# Patient Record
Sex: FEMALE | Race: WHITE | Employment: UNEMPLOYED | URBAN - NONMETROPOLITAN AREA
[De-identification: names, ages, dates, MRNs, and addresses within clinical notes are randomized per-mention and may not be internally consistent; named-entity substitution may affect disease eponyms.]

---

## 2017-01-11 ENCOUNTER — HOSPITAL ENCOUNTER (OUTPATIENT)
Dept: LAB | Age: 62
Discharge: HOME | End: 2017-01-11
Attending: DERMATOLOGY | Admitting: DERMATOLOGY

## 2017-01-11 LAB
ALT SERPL-CCNC: 11 U/L (ref 10–40)
AST SERPL-CCNC: 20 IU/L (ref 15–37)
BUN BLDV-MCNC: 5 MG/DL (ref 6–23)
CREAT SERPL-MCNC: 0.6 MG/DL (ref 0.6–1.1)
GFR AFRICAN AMERICAN: >60 ML/MIN/1.73M2
GFR NON-AFRICAN AMERICAN: >60 ML/MIN/1.73M2
HCT VFR BLD CALC: 38.4 % (ref 37–47)
HEMOGLOBIN: 13.3 GM/DL (ref 12.5–16)
MCH RBC QN AUTO: 34.6 PG (ref 27–31)
MCHC RBC AUTO-ENTMCNC: 34.6 % (ref 32–36)
MCV RBC AUTO: 100 FL (ref 78–100)
PDW BLD-RTO: 11.9 % (ref 11.7–14.9)
PLATELET # BLD: 252 K/CU MM (ref 140–440)
PMV BLD AUTO: 9.6 FL (ref 7.5–11.1)
RBC # BLD: 3.84 M/CU MM (ref 4.2–5.4)
WBC # BLD: 6.7 K/CU MM (ref 4–10.5)

## 2017-01-13 LAB — ANTI-NUCLEAR ANTIBODY (ANA): NORMAL

## 2017-01-30 ENCOUNTER — OFFICE VISIT (OUTPATIENT)
Dept: INTERNAL MEDICINE CLINIC | Age: 62
End: 2017-01-30

## 2017-01-30 VITALS
TEMPERATURE: 98.3 F | RESPIRATION RATE: 12 BRPM | BODY MASS INDEX: 17.08 KG/M2 | HEIGHT: 63 IN | OXYGEN SATURATION: 98 % | DIASTOLIC BLOOD PRESSURE: 72 MMHG | HEART RATE: 76 BPM | SYSTOLIC BLOOD PRESSURE: 136 MMHG | WEIGHT: 96.4 LBS

## 2017-01-30 DIAGNOSIS — N83.201 CYST OF RIGHT OVARY: ICD-10-CM

## 2017-01-30 DIAGNOSIS — L93.0 DISCOID LUPUS: ICD-10-CM

## 2017-01-30 DIAGNOSIS — R63.4 WEIGHT LOSS: Primary | ICD-10-CM

## 2017-01-30 DIAGNOSIS — Z72.0 TOBACCO ABUSE DISORDER: ICD-10-CM

## 2017-01-30 DIAGNOSIS — Z23 NEED FOR PROPHYLACTIC VACCINATION AGAINST STREPTOCOCCUS PNEUMONIAE (PNEUMOCOCCUS): ICD-10-CM

## 2017-01-30 PROCEDURE — 90471 IMMUNIZATION ADMIN: CPT | Performed by: INTERNAL MEDICINE

## 2017-01-30 PROCEDURE — 90670 PCV13 VACCINE IM: CPT | Performed by: INTERNAL MEDICINE

## 2017-01-30 PROCEDURE — 99213 OFFICE O/P EST LOW 20 MIN: CPT | Performed by: INTERNAL MEDICINE

## 2017-01-30 ASSESSMENT — ENCOUNTER SYMPTOMS
GASTROINTESTINAL NEGATIVE: 1
EYES NEGATIVE: 1

## 2017-03-29 ENCOUNTER — TELEPHONE (OUTPATIENT)
Dept: INTERNAL MEDICINE CLINIC | Age: 62
End: 2017-03-29

## 2017-08-01 ENCOUNTER — HOSPITAL ENCOUNTER (OUTPATIENT)
Age: 62
Discharge: HOME | End: 2017-08-01
Payer: COMMERCIAL

## 2017-08-01 LAB
ALBUMIN SERPL-MCNC: NORMAL G/DL
ALP BLD-CCNC: NORMAL U/L
ALT SERPL-CCNC: 29 U/L
ALT SERPL-CCNC: 29 U/L (ref 11–66)
ANA TITER: NORMAL
ANION GAP SERPL CALCULATED.3IONS-SCNC: NORMAL MMOL/L
AST SERPL-CCNC: NORMAL U/L
BASOPHILS ABSOLUTE: ABNORMAL /ΜL
BASOPHILS RELATIVE PERCENT: ABNORMAL %
BILIRUB SERPL-MCNC: NORMAL MG/DL (ref 0.1–1.4)
BUN BLDV-MCNC: 8 MG/DL
BUN BLDV-MCNC: 8 MG/DL (ref 7–22)
CALCIUM SERPL-MCNC: NORMAL MG/DL
CHLORIDE BLD-SCNC: NORMAL MMOL/L
CO2: NORMAL MMOL/L
CREAT SERPL-MCNC: 0.4 MG/DL
CREAT SERPL-MCNC: 0.4 MG/DL (ref 0.4–1.2)
EOSINOPHILS ABSOLUTE: ABNORMAL /ΜL
EOSINOPHILS RELATIVE PERCENT: ABNORMAL %
GFR CALCULATED: >90
GFR SERPL CREATININE-BSD FRML MDRD: > 90 ML/MIN/1.73M2
GLUCOSE BLD-MCNC: 82 MG/DL
GLUCOSE BLD-MCNC: 82 MG/DL (ref 70–108)
HCT VFR BLD CALC: 40.9 % (ref 36–46)
HCT VFR BLD CALC: 40.9 % (ref 37–47)
HEMOGLOBIN: 13.9 G/DL (ref 12–16)
HEMOGLOBIN: 13.9 GM/DL (ref 12–16)
LD: 177 U/L (ref 100–190)
LYMPHOCYTES ABSOLUTE: ABNORMAL /ΜL
LYMPHOCYTES RELATIVE PERCENT: ABNORMAL %
MCH RBC QN AUTO: 34.5 PG
MCH RBC QN AUTO: 34.5 PG (ref 27–31)
MCHC RBC AUTO-ENTMCNC: 34 G/DL
MCHC RBC AUTO-ENTMCNC: 34 GM/DL (ref 33–37)
MCV RBC AUTO: 101.5 FL
MCV RBC AUTO: 101.5 FL (ref 81–99)
MONOCYTES ABSOLUTE: ABNORMAL /ΜL
MONOCYTES RELATIVE PERCENT: ABNORMAL %
NEUTROPHILS ABSOLUTE: ABNORMAL /ΜL
NEUTROPHILS RELATIVE PERCENT: ABNORMAL %
PDW BLD-RTO: 13.2 % (ref 11.5–14.5)
PLATELET # BLD: 274 K/ΜL
PLATELET # BLD: 274 THOU/MM3 (ref 130–400)
PMV BLD AUTO: 7.9 FL
PMV BLD AUTO: 7.9 MCM (ref 7.4–10.4)
POTASSIUM SERPL-SCNC: NORMAL MMOL/L
RBC # BLD: 4.03 10^6/ΜL
RBC # BLD: 4.03 MILL/MM3 (ref 4.2–5.4)
SEDIMENTATION RATE, ERYTHROCYTE: 20
SEDIMENTATION RATE, ERYTHROCYTE: 20 MM/HR (ref 0–20)
SODIUM BLD-SCNC: NORMAL MMOL/L
TOTAL PROTEIN: NORMAL
WBC # BLD: 3.9 10^3/ML
WBC # BLD: 3.9 THOU/MM3 (ref 4.8–10.8)

## 2017-08-01 PROCEDURE — 83615 LACTATE (LD) (LDH) ENZYME: CPT

## 2017-08-01 PROCEDURE — 36415 COLL VENOUS BLD VENIPUNCTURE: CPT

## 2017-08-01 PROCEDURE — 85651 RBC SED RATE NONAUTOMATED: CPT

## 2017-08-01 PROCEDURE — 86038 ANTINUCLEAR ANTIBODIES: CPT

## 2017-08-01 PROCEDURE — 82947 ASSAY GLUCOSE BLOOD QUANT: CPT

## 2017-08-01 PROCEDURE — 85027 COMPLETE CBC AUTOMATED: CPT

## 2017-08-01 PROCEDURE — 82565 ASSAY OF CREATININE: CPT

## 2017-08-01 PROCEDURE — 84460 ALANINE AMINO (ALT) (SGPT): CPT

## 2017-08-01 PROCEDURE — 84520 ASSAY OF UREA NITROGEN: CPT

## 2017-08-03 LAB — ANA SCREEN: NORMAL

## 2018-03-13 ENCOUNTER — HOSPITAL ENCOUNTER (OUTPATIENT)
Age: 63
Discharge: HOME | End: 2018-03-13
Payer: COMMERCIAL

## 2018-03-13 LAB
ALBUMIN SERPL-MCNC: 4.5 G/DL (ref 3.5–5.1)
ALP BLD-CCNC: 80 U/L (ref 38–126)
ALT SERPL-CCNC: 24 U/L (ref 11–66)
ANION GAP SERPL CALCULATED.3IONS-SCNC: 15 MEQ/L (ref 8–16)
AST SERPL-CCNC: 27 U/L (ref 5–40)
BASOPHILS ABSOLUTE: ABNORMAL /ΜL
BASOPHILS RELATIVE PERCENT: ABNORMAL %
BILIRUB SERPL-MCNC: 0.3 MG/DL (ref 0.3–1.2)
BUN BLDV-MCNC: 6 MG/DL (ref 7–22)
CALCIUM SERPL-MCNC: 9.6 MG/DL (ref 8.5–10.5)
CHLORIDE BLD-SCNC: 93 MEQ/L (ref 98–111)
CO2: 25 MEQ/L (ref 23–33)
CREAT SERPL-MCNC: 0.4 MG/DL (ref 0.4–1.2)
EOSINOPHILS ABSOLUTE: ABNORMAL /ΜL
EOSINOPHILS RELATIVE PERCENT: ABNORMAL %
GFR SERPL CREATININE-BSD FRML MDRD: > 90 ML/MIN/1.73M2
GLUCOSE BLD-MCNC: 76 MG/DL (ref 70–108)
HCT VFR BLD CALC: 40.7 % (ref 36–46)
HCT VFR BLD CALC: 40.7 % (ref 37–47)
HEMOGLOBIN: 14.2 G/DL (ref 12–16)
HEMOGLOBIN: 14.2 GM/DL (ref 12–16)
LYMPHOCYTES ABSOLUTE: ABNORMAL /ΜL
LYMPHOCYTES RELATIVE PERCENT: ABNORMAL %
MCH RBC QN AUTO: 35.3 PG
MCH RBC QN AUTO: 35.3 PG (ref 27–31)
MCHC RBC AUTO-ENTMCNC: 34.9 G/DL
MCHC RBC AUTO-ENTMCNC: 34.9 GM/DL (ref 33–37)
MCV RBC AUTO: 101.2 FL
MCV RBC AUTO: 101.2 FL (ref 81–99)
MONOCYTES ABSOLUTE: ABNORMAL /ΜL
MONOCYTES RELATIVE PERCENT: ABNORMAL %
NEUTROPHILS ABSOLUTE: ABNORMAL /ΜL
NEUTROPHILS RELATIVE PERCENT: ABNORMAL %
PDW BLD-RTO: 12.1 % (ref 11.5–14.5)
PLATELET # BLD: 295 K/ΜL
PLATELET # BLD: 295 THOU/MM3 (ref 130–400)
PMV BLD AUTO: 7.8 FL
PMV BLD AUTO: 7.8 FL (ref 7.4–10.4)
POTASSIUM SERPL-SCNC: 3.9 MEQ/L (ref 3.5–5.2)
RBC # BLD: 4.02 10^6/ΜL
RBC # BLD: 4.02 MILL/MM3 (ref 4.2–5.4)
SODIUM BLD-SCNC: 133 MEQ/L (ref 135–145)
TOTAL PROTEIN: 7.7 G/DL (ref 6.1–8)
WBC # BLD: 4.3 10^3/ML
WBC # BLD: 4.3 THOU/MM3 (ref 4.8–10.8)

## 2018-03-13 PROCEDURE — 80053 COMPREHEN METABOLIC PANEL: CPT

## 2018-03-13 PROCEDURE — 85027 COMPLETE CBC AUTOMATED: CPT

## 2018-03-13 PROCEDURE — 36415 COLL VENOUS BLD VENIPUNCTURE: CPT

## 2018-04-08 ENCOUNTER — HOSPITAL ENCOUNTER (INPATIENT)
Age: 63
LOS: 3 days | Discharge: HOME HEALTH | DRG: 190 | End: 2018-04-11
Attending: FAMILY MEDICINE | Admitting: INTERNAL MEDICINE
Payer: COMMERCIAL

## 2018-04-08 ENCOUNTER — APPOINTMENT (OUTPATIENT)
Dept: GENERAL RADIOLOGY | Age: 63
DRG: 190 | End: 2018-04-08
Payer: COMMERCIAL

## 2018-04-08 ENCOUNTER — APPOINTMENT (OUTPATIENT)
Dept: CT IMAGING | Age: 63
DRG: 190 | End: 2018-04-08
Payer: COMMERCIAL

## 2018-04-08 DIAGNOSIS — J84.9 INTERSTITIAL PNEUMONIA (HCC): ICD-10-CM

## 2018-04-08 DIAGNOSIS — J44.1 COPD WITH EXACERBATION (HCC): ICD-10-CM

## 2018-04-08 DIAGNOSIS — J18.9 PNEUMONIA DUE TO ORGANISM: Primary | ICD-10-CM

## 2018-04-08 DIAGNOSIS — E44.0 MODERATE PROTEIN-CALORIE MALNUTRITION (HCC): ICD-10-CM

## 2018-04-08 DIAGNOSIS — E87.1 HYPONATREMIA: ICD-10-CM

## 2018-04-08 PROBLEM — R59.0 MEDIASTINAL LYMPHADENOPATHY: Status: ACTIVE | Noted: 2018-04-08

## 2018-04-08 LAB
ALBUMIN SERPL-MCNC: 3.8 G/DL (ref 3.5–5.1)
ALLEN TEST: POSITIVE
ALP BLD-CCNC: 80 U/L (ref 38–126)
ALT SERPL-CCNC: 14 U/L (ref 11–66)
ANION GAP SERPL CALCULATED.3IONS-SCNC: 16 MEQ/L (ref 8–16)
APTT: 28 SECONDS (ref 22–38)
AST SERPL-CCNC: 19 U/L (ref 5–40)
BANDED NEUTROPHILS ABSOLUTE COUNT: 0.3 THOU/MM3
BANDS PRESENT: 3 %
BASE EXCESS (CALCULATED): -2.8 MMOL/L (ref -2.5–2.5)
BASOPHILS # BLD: 0 %
BASOPHILS ABSOLUTE: 0 THOU/MM3 (ref 0–0.1)
BILIRUB SERPL-MCNC: 0.6 MG/DL (ref 0.3–1.2)
BUN BLDV-MCNC: 5 MG/DL (ref 7–22)
CALCIUM SERPL-MCNC: 9.1 MG/DL (ref 8.5–10.5)
CHLORIDE BLD-SCNC: 83 MEQ/L (ref 98–111)
CO2: 22 MEQ/L (ref 23–33)
COLLECTED BY:: ABNORMAL
CREAT SERPL-MCNC: 0.3 MG/DL (ref 0.4–1.2)
DEVICE: ABNORMAL
DIFFERENTIAL, MANUAL: NORMAL
EKG ATRIAL RATE: 117 BPM
EKG P AXIS: 78 DEGREES
EKG P-R INTERVAL: 162 MS
EKG Q-T INTERVAL: 326 MS
EKG QRS DURATION: 90 MS
EKG QTC CALCULATION (BAZETT): 454 MS
EKG R AXIS: 79 DEGREES
EKG T AXIS: 76 DEGREES
EKG VENTRICULAR RATE: 117 BPM
EOSINOPHIL # BLD: 0 %
EOSINOPHILS ABSOLUTE: 0 THOU/MM3 (ref 0–0.4)
GFR SERPL CREATININE-BSD FRML MDRD: > 90 ML/MIN/1.73M2
GLUCOSE BLD-MCNC: 99 MG/DL (ref 70–108)
HCO3: 21 MMOL/L (ref 23–28)
HCT VFR BLD CALC: 39.2 % (ref 37–47)
HEMOGLOBIN: 13 GM/DL (ref 12–16)
IFIO2: 2
LACTIC ACID: 1.3 MMOL/L (ref 0.5–2.2)
LIPASE: 13 U/L (ref 5.6–51.3)
LYMPHOCYTES # BLD: 21 %
LYMPHOCYTES ABSOLUTE: 1.9 THOU/MM3 (ref 1–4.8)
MACROCYTES: ABNORMAL
MCH RBC QN AUTO: 33.5 PG (ref 27–31)
MCHC RBC AUTO-ENTMCNC: 33.3 GM/DL (ref 33–37)
MCV RBC AUTO: 100.5 FL (ref 81–99)
MONOCYTES # BLD: 9 %
MONOCYTES ABSOLUTE: 0.8 THOU/MM3 (ref 0.4–1.3)
NUCLEATED RED BLOOD CELLS: 0 /100 WBC
O2 SATURATION: 94 %
OSMOLALITY CALCULATION: 241.3 MOSMOL/KG (ref 275–300)
PCO2: 33 MMHG (ref 35–45)
PDW BLD-RTO: 12.6 % (ref 11.5–14.5)
PH BLOOD GAS: 7.42 (ref 7.35–7.45)
PLATELET # BLD: 273 THOU/MM3 (ref 130–400)
PLATELET ESTIMATE: ADEQUATE
PMV BLD AUTO: 8.6 FL (ref 7.4–10.4)
PO2: 67 MMHG (ref 71–104)
POTASSIUM REFLEX MAGNESIUM: 4 MEQ/L (ref 3.5–5.2)
PRO-BNP: 249 PG/ML (ref 0–900)
PROCALCITONIN: 0.95 NG/ML (ref 0.01–0.09)
RBC # BLD: 3.9 MILL/MM3 (ref 4.2–5.4)
SEG NEUTROPHILS: 67 %
SEGMENTED NEUTROPHILS ABSOLUTE COUNT: 6 THOU/MM3 (ref 1.8–7.7)
SODIUM BLD-SCNC: 121 MEQ/L (ref 135–145)
SODIUM BLD-SCNC: 126 MEQ/L (ref 135–145)
SOURCE, BLOOD GAS: ABNORMAL
TOTAL PROTEIN: 7.2 G/DL (ref 6.1–8)
TROPONIN T: < 0.01 NG/ML
TSH SERPL DL<=0.05 MIU/L-ACNC: 0.94 UIU/ML (ref 0.4–4.2)
WBC # BLD: 8.9 THOU/MM3 (ref 4.8–10.8)

## 2018-04-08 PROCEDURE — 96365 THER/PROPH/DIAG IV INF INIT: CPT

## 2018-04-08 PROCEDURE — 6360000004 HC RX CONTRAST MEDICATION: Performed by: FAMILY MEDICINE

## 2018-04-08 PROCEDURE — 2580000003 HC RX 258: Performed by: INTERNAL MEDICINE

## 2018-04-08 PROCEDURE — 71275 CT ANGIOGRAPHY CHEST: CPT

## 2018-04-08 PROCEDURE — 36415 COLL VENOUS BLD VENIPUNCTURE: CPT

## 2018-04-08 PROCEDURE — 84484 ASSAY OF TROPONIN QUANT: CPT

## 2018-04-08 PROCEDURE — 82803 BLOOD GASES ANY COMBINATION: CPT

## 2018-04-08 PROCEDURE — 85025 COMPLETE CBC W/AUTO DIFF WBC: CPT

## 2018-04-08 PROCEDURE — 94640 AIRWAY INHALATION TREATMENT: CPT

## 2018-04-08 PROCEDURE — 80053 COMPREHEN METABOLIC PANEL: CPT

## 2018-04-08 PROCEDURE — 2580000003 HC RX 258: Performed by: FAMILY MEDICINE

## 2018-04-08 PROCEDURE — 99223 1ST HOSP IP/OBS HIGH 75: CPT | Performed by: INTERNAL MEDICINE

## 2018-04-08 PROCEDURE — 99285 EMERGENCY DEPT VISIT HI MDM: CPT

## 2018-04-08 PROCEDURE — 1200000003 HC TELEMETRY R&B

## 2018-04-08 PROCEDURE — 6360000002 HC RX W HCPCS: Performed by: FAMILY MEDICINE

## 2018-04-08 PROCEDURE — 71045 X-RAY EXAM CHEST 1 VIEW: CPT

## 2018-04-08 PROCEDURE — 84145 PROCALCITONIN (PCT): CPT

## 2018-04-08 PROCEDURE — 6360000002 HC RX W HCPCS: Performed by: INTERNAL MEDICINE

## 2018-04-08 PROCEDURE — 96375 TX/PRO/DX INJ NEW DRUG ADDON: CPT

## 2018-04-08 PROCEDURE — 83880 ASSAY OF NATRIURETIC PEPTIDE: CPT

## 2018-04-08 PROCEDURE — 87040 BLOOD CULTURE FOR BACTERIA: CPT

## 2018-04-08 PROCEDURE — 83690 ASSAY OF LIPASE: CPT

## 2018-04-08 PROCEDURE — 84443 ASSAY THYROID STIM HORMONE: CPT

## 2018-04-08 PROCEDURE — 6370000000 HC RX 637 (ALT 250 FOR IP): Performed by: FAMILY MEDICINE

## 2018-04-08 PROCEDURE — 6370000000 HC RX 637 (ALT 250 FOR IP): Performed by: INTERNAL MEDICINE

## 2018-04-08 PROCEDURE — 84295 ASSAY OF SERUM SODIUM: CPT

## 2018-04-08 PROCEDURE — 93005 ELECTROCARDIOGRAM TRACING: CPT | Performed by: FAMILY MEDICINE

## 2018-04-08 PROCEDURE — 83605 ASSAY OF LACTIC ACID: CPT

## 2018-04-08 PROCEDURE — 85730 THROMBOPLASTIN TIME PARTIAL: CPT

## 2018-04-08 PROCEDURE — 36600 WITHDRAWAL OF ARTERIAL BLOOD: CPT

## 2018-04-08 RX ORDER — IPRATROPIUM BROMIDE AND ALBUTEROL SULFATE 2.5; .5 MG/3ML; MG/3ML
2 SOLUTION RESPIRATORY (INHALATION) ONCE
Status: COMPLETED | OUTPATIENT
Start: 2018-04-08 | End: 2018-04-08

## 2018-04-08 RX ORDER — 0.9 % SODIUM CHLORIDE 0.9 %
1000 INTRAVENOUS SOLUTION INTRAVENOUS ONCE
Status: COMPLETED | OUTPATIENT
Start: 2018-04-08 | End: 2018-04-08

## 2018-04-08 RX ORDER — SODIUM CHLORIDE 450 MG/100ML
INJECTION, SOLUTION INTRAVENOUS CONTINUOUS
Status: DISCONTINUED | OUTPATIENT
Start: 2018-04-08 | End: 2018-04-09

## 2018-04-08 RX ORDER — GUAIFENESIN 600 MG/1
600 TABLET, EXTENDED RELEASE ORAL 2 TIMES DAILY
Status: DISCONTINUED | OUTPATIENT
Start: 2018-04-08 | End: 2018-04-11 | Stop reason: HOSPADM

## 2018-04-08 RX ORDER — ONDANSETRON 2 MG/ML
4 INJECTION INTRAMUSCULAR; INTRAVENOUS EVERY 6 HOURS PRN
Status: DISCONTINUED | OUTPATIENT
Start: 2018-04-08 | End: 2018-04-11 | Stop reason: HOSPADM

## 2018-04-08 RX ORDER — IPRATROPIUM BROMIDE AND ALBUTEROL SULFATE 2.5; .5 MG/3ML; MG/3ML
1 SOLUTION RESPIRATORY (INHALATION)
Status: DISCONTINUED | OUTPATIENT
Start: 2018-04-09 | End: 2018-04-11 | Stop reason: HOSPADM

## 2018-04-08 RX ORDER — LEVOFLOXACIN 5 MG/ML
750 INJECTION, SOLUTION INTRAVENOUS ONCE
Status: COMPLETED | OUTPATIENT
Start: 2018-04-08 | End: 2018-04-08

## 2018-04-08 RX ORDER — METHYLPREDNISOLONE SODIUM SUCCINATE 125 MG/2ML
125 INJECTION, POWDER, LYOPHILIZED, FOR SOLUTION INTRAMUSCULAR; INTRAVENOUS ONCE
Status: COMPLETED | OUTPATIENT
Start: 2018-04-08 | End: 2018-04-08

## 2018-04-08 RX ORDER — HYDROXYCHLOROQUINE SULFATE 200 MG/1
200 TABLET, FILM COATED ORAL 2 TIMES DAILY
Status: DISCONTINUED | OUTPATIENT
Start: 2018-04-08 | End: 2018-04-11 | Stop reason: HOSPADM

## 2018-04-08 RX ORDER — ACETAMINOPHEN 325 MG/1
650 TABLET ORAL EVERY 4 HOURS PRN
Status: DISCONTINUED | OUTPATIENT
Start: 2018-04-08 | End: 2018-04-11 | Stop reason: HOSPADM

## 2018-04-08 RX ORDER — SODIUM CHLORIDE 0.9 % (FLUSH) 0.9 %
10 SYRINGE (ML) INJECTION EVERY 12 HOURS SCHEDULED
Status: DISCONTINUED | OUTPATIENT
Start: 2018-04-08 | End: 2018-04-11 | Stop reason: HOSPADM

## 2018-04-08 RX ORDER — SODIUM CHLORIDE 0.9 % (FLUSH) 0.9 %
10 SYRINGE (ML) INJECTION PRN
Status: DISCONTINUED | OUTPATIENT
Start: 2018-04-08 | End: 2018-04-11 | Stop reason: HOSPADM

## 2018-04-08 RX ORDER — METHYLPREDNISOLONE SODIUM SUCCINATE 40 MG/ML
40 INJECTION, POWDER, LYOPHILIZED, FOR SOLUTION INTRAMUSCULAR; INTRAVENOUS DAILY
Status: DISCONTINUED | OUTPATIENT
Start: 2018-04-09 | End: 2018-04-09

## 2018-04-08 RX ORDER — ALBUTEROL SULFATE 2.5 MG/3ML
5 SOLUTION RESPIRATORY (INHALATION) ONCE
Status: COMPLETED | OUTPATIENT
Start: 2018-04-08 | End: 2018-04-08

## 2018-04-08 RX ORDER — ALBUTEROL SULFATE 2.5 MG/3ML
2.5 SOLUTION RESPIRATORY (INHALATION)
Status: DISCONTINUED | OUTPATIENT
Start: 2018-04-08 | End: 2018-04-11 | Stop reason: HOSPADM

## 2018-04-08 RX ADMIN — IPRATROPIUM BROMIDE AND ALBUTEROL SULFATE 2 AMPULE: .5; 3 SOLUTION RESPIRATORY (INHALATION) at 18:02

## 2018-04-08 RX ADMIN — IOPAMIDOL 80 ML: 755 INJECTION, SOLUTION INTRAVENOUS at 18:31

## 2018-04-08 RX ADMIN — SODIUM CHLORIDE 1000 ML: 9 INJECTION, SOLUTION INTRAVENOUS at 18:00

## 2018-04-08 RX ADMIN — HYDROXYCHLOROQUINE SULFATE 200 MG: 200 TABLET, FILM COATED ORAL at 21:34

## 2018-04-08 RX ADMIN — ENOXAPARIN SODIUM 40 MG: 40 INJECTION SUBCUTANEOUS at 21:34

## 2018-04-08 RX ADMIN — Medication 10 ML: at 21:34

## 2018-04-08 RX ADMIN — METHYLPREDNISOLONE SODIUM SUCCINATE 125 MG: 125 INJECTION, POWDER, FOR SOLUTION INTRAMUSCULAR; INTRAVENOUS at 18:42

## 2018-04-08 RX ADMIN — SODIUM CHLORIDE: 4.5 INJECTION, SOLUTION INTRAVENOUS at 21:34

## 2018-04-08 RX ADMIN — CEFTRIAXONE SODIUM 1 G: 1 INJECTION, POWDER, FOR SOLUTION INTRAMUSCULAR; INTRAVENOUS at 18:43

## 2018-04-08 RX ADMIN — LEVOFLOXACIN 750 MG: 5 INJECTION, SOLUTION INTRAVENOUS at 19:31

## 2018-04-08 RX ADMIN — ACETAMINOPHEN 650 MG: 325 TABLET ORAL at 21:50

## 2018-04-08 RX ADMIN — GUAIFENESIN 600 MG: 600 TABLET, EXTENDED RELEASE ORAL at 21:34

## 2018-04-08 RX ADMIN — ALBUTEROL SULFATE 5 MG: 2.5 SOLUTION RESPIRATORY (INHALATION) at 18:02

## 2018-04-08 ASSESSMENT — PAIN DESCRIPTION - PAIN TYPE
TYPE_2: ACUTE PAIN
TYPE: ACUTE PAIN
TYPE: CHRONIC PAIN

## 2018-04-08 ASSESSMENT — ENCOUNTER SYMPTOMS
ABDOMINAL PAIN: 0
EYE DISCHARGE: 0
SORE THROAT: 0
EYE PAIN: 0
COUGH: 1
RHINORRHEA: 0
CHEST TIGHTNESS: 1
DIARRHEA: 0
WHEEZING: 0
NAUSEA: 0
SHORTNESS OF BREATH: 1
BACK PAIN: 0
VOMITING: 0

## 2018-04-08 ASSESSMENT — PAIN SCALES - GENERAL
PAINLEVEL_OUTOF10: 8
PAINLEVEL_OUTOF10: 3
PAINLEVEL_OUTOF10: 1
PAINLEVEL_OUTOF10: 3

## 2018-04-08 ASSESSMENT — PAIN DESCRIPTION - ONSET
ONSET_2: GRADUAL
ONSET: ON-GOING

## 2018-04-08 ASSESSMENT — PAIN DESCRIPTION - INTENSITY
RATING_2: 3
RATING_2: 1

## 2018-04-08 ASSESSMENT — PAIN DESCRIPTION - LOCATION
LOCATION: BACK
LOCATION: RIB CAGE
LOCATION_2: HEAD

## 2018-04-08 ASSESSMENT — PAIN DESCRIPTION - PROGRESSION
CLINICAL_PROGRESSION_2: NOT CHANGED
CLINICAL_PROGRESSION: NOT CHANGED

## 2018-04-08 ASSESSMENT — PAIN DESCRIPTION - ORIENTATION
ORIENTATION: RIGHT
ORIENTATION: RIGHT;LOWER

## 2018-04-08 ASSESSMENT — PAIN DESCRIPTION - DESCRIPTORS
DESCRIPTORS_2: HEADACHE
DESCRIPTORS: ACHING

## 2018-04-08 ASSESSMENT — PAIN DESCRIPTION - DURATION: DURATION_2: INTERMITTENT

## 2018-04-08 ASSESSMENT — PAIN DESCRIPTION - FREQUENCY: FREQUENCY: INTERMITTENT

## 2018-04-09 PROBLEM — E04.1 THYROID NODULE: Status: ACTIVE | Noted: 2018-04-09

## 2018-04-09 LAB
ANION GAP SERPL CALCULATED.3IONS-SCNC: 12 MEQ/L (ref 8–16)
BUN BLDV-MCNC: 4 MG/DL (ref 7–22)
CALCIUM SERPL-MCNC: 8.5 MG/DL (ref 8.5–10.5)
CHLORIDE BLD-SCNC: 92 MEQ/L (ref 98–111)
CO2: 24 MEQ/L (ref 23–33)
CREAT SERPL-MCNC: 0.4 MG/DL (ref 0.4–1.2)
GFR SERPL CREATININE-BSD FRML MDRD: > 90 ML/MIN/1.73M2
GLUCOSE BLD-MCNC: 159 MG/DL (ref 70–108)
HCT VFR BLD CALC: 35.1 % (ref 37–47)
HEMOGLOBIN: 12.1 GM/DL (ref 12–16)
MCH RBC QN AUTO: 34.9 PG (ref 27–31)
MCHC RBC AUTO-ENTMCNC: 34.6 GM/DL (ref 33–37)
MCV RBC AUTO: 101 FL (ref 81–99)
PDW BLD-RTO: 12.3 % (ref 11.5–14.5)
PLATELET # BLD: 260 THOU/MM3 (ref 130–400)
PMV BLD AUTO: 8.4 FL (ref 7.4–10.4)
POTASSIUM REFLEX MAGNESIUM: 3.8 MEQ/L (ref 3.5–5.2)
RBC # BLD: 3.47 MILL/MM3 (ref 4.2–5.4)
SODIUM BLD-SCNC: 128 MEQ/L (ref 135–145)
SODIUM BLD-SCNC: 128 MEQ/L (ref 135–145)
SODIUM BLD-SCNC: 130 MEQ/L (ref 135–145)
WBC # BLD: 8.5 THOU/MM3 (ref 4.8–10.8)

## 2018-04-09 PROCEDURE — 87070 CULTURE OTHR SPECIMN AEROBIC: CPT

## 2018-04-09 PROCEDURE — 94669 MECHANICAL CHEST WALL OSCILL: CPT

## 2018-04-09 PROCEDURE — 6360000002 HC RX W HCPCS: Performed by: INTERNAL MEDICINE

## 2018-04-09 PROCEDURE — 6370000000 HC RX 637 (ALT 250 FOR IP): Performed by: INTERNAL MEDICINE

## 2018-04-09 PROCEDURE — 36415 COLL VENOUS BLD VENIPUNCTURE: CPT

## 2018-04-09 PROCEDURE — 1200000003 HC TELEMETRY R&B

## 2018-04-09 PROCEDURE — 99223 1ST HOSP IP/OBS HIGH 75: CPT | Performed by: INTERNAL MEDICINE

## 2018-04-09 PROCEDURE — 94640 AIRWAY INHALATION TREATMENT: CPT

## 2018-04-09 PROCEDURE — 99233 SBSQ HOSP IP/OBS HIGH 50: CPT | Performed by: INTERNAL MEDICINE

## 2018-04-09 PROCEDURE — 2580000003 HC RX 258: Performed by: INTERNAL MEDICINE

## 2018-04-09 PROCEDURE — 84295 ASSAY OF SERUM SODIUM: CPT

## 2018-04-09 PROCEDURE — 87899 AGENT NOS ASSAY W/OPTIC: CPT

## 2018-04-09 PROCEDURE — APPSS180 APP SPLIT SHARED TIME > 60 MINUTES: Performed by: NURSE PRACTITIONER

## 2018-04-09 PROCEDURE — 87205 SMEAR GRAM STAIN: CPT

## 2018-04-09 PROCEDURE — 85027 COMPLETE CBC AUTOMATED: CPT

## 2018-04-09 PROCEDURE — 2700000000 HC OXYGEN THERAPY PER DAY

## 2018-04-09 PROCEDURE — 87449 NOS EACH ORGANISM AG IA: CPT

## 2018-04-09 PROCEDURE — 80048 BASIC METABOLIC PNL TOTAL CA: CPT

## 2018-04-09 RX ORDER — AZITHROMYCIN 250 MG/1
500 TABLET, FILM COATED ORAL DAILY
Status: DISCONTINUED | OUTPATIENT
Start: 2018-04-09 | End: 2018-04-11 | Stop reason: HOSPADM

## 2018-04-09 RX ORDER — ALBUTEROL SULFATE 90 UG/1
2 AEROSOL, METERED RESPIRATORY (INHALATION) EVERY 4 HOURS PRN
Status: DISCONTINUED | OUTPATIENT
Start: 2018-04-09 | End: 2018-04-11 | Stop reason: HOSPADM

## 2018-04-09 RX ORDER — SODIUM CHLORIDE 9 MG/ML
INJECTION, SOLUTION INTRAVENOUS CONTINUOUS
Status: DISCONTINUED | OUTPATIENT
Start: 2018-04-09 | End: 2018-04-11

## 2018-04-09 RX ORDER — PREDNISONE 20 MG/1
40 TABLET ORAL DAILY
Status: DISCONTINUED | OUTPATIENT
Start: 2018-04-10 | End: 2018-04-11 | Stop reason: HOSPADM

## 2018-04-09 RX ADMIN — ACETAMINOPHEN 650 MG: 325 TABLET ORAL at 19:30

## 2018-04-09 RX ADMIN — SODIUM CHLORIDE: 4.5 INJECTION, SOLUTION INTRAVENOUS at 10:14

## 2018-04-09 RX ADMIN — IPRATROPIUM BROMIDE AND ALBUTEROL SULFATE 1 AMPULE: .5; 3 SOLUTION RESPIRATORY (INHALATION) at 07:24

## 2018-04-09 RX ADMIN — ONDANSETRON 4 MG: 2 INJECTION INTRAMUSCULAR; INTRAVENOUS at 15:50

## 2018-04-09 RX ADMIN — IPRATROPIUM BROMIDE AND ALBUTEROL SULFATE 1 AMPULE: .5; 3 SOLUTION RESPIRATORY (INHALATION) at 17:29

## 2018-04-09 RX ADMIN — METHYLPREDNISOLONE SODIUM SUCCINATE 40 MG: 40 INJECTION, POWDER, FOR SOLUTION INTRAMUSCULAR; INTRAVENOUS at 09:58

## 2018-04-09 RX ADMIN — CEFTRIAXONE SODIUM 2 G: 2 INJECTION, POWDER, FOR SOLUTION INTRAMUSCULAR; INTRAVENOUS at 15:21

## 2018-04-09 RX ADMIN — IPRATROPIUM BROMIDE AND ALBUTEROL SULFATE 1 AMPULE: .5; 3 SOLUTION RESPIRATORY (INHALATION) at 11:30

## 2018-04-09 RX ADMIN — HYDROXYCHLOROQUINE SULFATE 200 MG: 200 TABLET, FILM COATED ORAL at 20:36

## 2018-04-09 RX ADMIN — AZITHROMYCIN 500 MG: 250 TABLET, FILM COATED ORAL at 15:21

## 2018-04-09 RX ADMIN — SODIUM CHLORIDE: 9 INJECTION, SOLUTION INTRAVENOUS at 13:12

## 2018-04-09 RX ADMIN — IPRATROPIUM BROMIDE AND ALBUTEROL SULFATE 1 AMPULE: .5; 3 SOLUTION RESPIRATORY (INHALATION) at 20:48

## 2018-04-09 RX ADMIN — ENOXAPARIN SODIUM 40 MG: 40 INJECTION SUBCUTANEOUS at 22:20

## 2018-04-09 RX ADMIN — GUAIFENESIN 600 MG: 600 TABLET, EXTENDED RELEASE ORAL at 09:58

## 2018-04-09 RX ADMIN — GUAIFENESIN 600 MG: 600 TABLET, EXTENDED RELEASE ORAL at 20:36

## 2018-04-09 RX ADMIN — HYDROXYCHLOROQUINE SULFATE 200 MG: 200 TABLET, FILM COATED ORAL at 09:58

## 2018-04-09 ASSESSMENT — PAIN DESCRIPTION - PROGRESSION: CLINICAL_PROGRESSION: NOT CHANGED

## 2018-04-09 ASSESSMENT — PAIN SCALES - GENERAL
PAINLEVEL_OUTOF10: 0
PAINLEVEL_OUTOF10: 3

## 2018-04-10 LAB
ALBUMIN SERPL-MCNC: 2.9 G/DL (ref 3.5–5.1)
ALP BLD-CCNC: 68 U/L (ref 38–126)
ALT SERPL-CCNC: 19 U/L (ref 11–66)
ANION GAP SERPL CALCULATED.3IONS-SCNC: 9 MEQ/L (ref 8–16)
AST SERPL-CCNC: 25 U/L (ref 5–40)
BASOPHILS # BLD: 1 %
BASOPHILS ABSOLUTE: 0.1 THOU/MM3 (ref 0–0.1)
BILIRUB SERPL-MCNC: 0.2 MG/DL (ref 0.3–1.2)
BUN BLDV-MCNC: 7 MG/DL (ref 7–22)
CALCIUM SERPL-MCNC: 8.3 MG/DL (ref 8.5–10.5)
CHLORIDE BLD-SCNC: 97 MEQ/L (ref 98–111)
CO2: 25 MEQ/L (ref 23–33)
CREAT SERPL-MCNC: 0.4 MG/DL (ref 0.4–1.2)
EOSINOPHIL # BLD: 0 %
EOSINOPHILS ABSOLUTE: 0 THOU/MM3 (ref 0–0.4)
GFR SERPL CREATININE-BSD FRML MDRD: > 90 ML/MIN/1.73M2
GLUCOSE BLD-MCNC: 144 MG/DL (ref 70–108)
HCT VFR BLD CALC: 31.9 % (ref 37–47)
HEMOGLOBIN: 10.6 GM/DL (ref 12–16)
LYMPHOCYTES # BLD: 10 %
LYMPHOCYTES ABSOLUTE: 1.1 THOU/MM3 (ref 1–4.8)
MACROCYTES: ABNORMAL
MAGNESIUM: 2.1 MG/DL (ref 1.6–2.4)
MCH RBC QN AUTO: 33.7 PG (ref 27–31)
MCHC RBC AUTO-ENTMCNC: 33.2 GM/DL (ref 33–37)
MCV RBC AUTO: 101.4 FL (ref 81–99)
MONOCYTES # BLD: 14.8 %
MONOCYTES ABSOLUTE: 1.6 THOU/MM3 (ref 0.4–1.3)
NUCLEATED RED BLOOD CELLS: 0 /100 WBC
PDW BLD-RTO: 13 % (ref 11.5–14.5)
PHOSPHORUS: 1.5 MG/DL (ref 2.4–4.7)
PLATELET # BLD: 271 THOU/MM3 (ref 130–400)
PMV BLD AUTO: 7.8 FL (ref 7.4–10.4)
POTASSIUM SERPL-SCNC: 3.6 MEQ/L (ref 3.5–5.2)
RBC # BLD: 3.15 MILL/MM3 (ref 4.2–5.4)
SEG NEUTROPHILS: 74.2 %
SEGMENTED NEUTROPHILS ABSOLUTE COUNT: 7.9 THOU/MM3 (ref 1.8–7.7)
SODIUM BLD-SCNC: 131 MEQ/L (ref 135–145)
SODIUM BLD-SCNC: 132 MEQ/L (ref 135–145)
STREP PNEUMO AG, UR: NEGATIVE
TOTAL PROTEIN: 6 G/DL (ref 6.1–8)
WBC # BLD: 10.6 THOU/MM3 (ref 4.8–10.8)

## 2018-04-10 PROCEDURE — 83735 ASSAY OF MAGNESIUM: CPT

## 2018-04-10 PROCEDURE — 6360000002 HC RX W HCPCS: Performed by: INTERNAL MEDICINE

## 2018-04-10 PROCEDURE — 99232 SBSQ HOSP IP/OBS MODERATE 35: CPT | Performed by: INTERNAL MEDICINE

## 2018-04-10 PROCEDURE — 2500000003 HC RX 250 WO HCPCS: Performed by: INTERNAL MEDICINE

## 2018-04-10 PROCEDURE — 36415 COLL VENOUS BLD VENIPUNCTURE: CPT

## 2018-04-10 PROCEDURE — 80053 COMPREHEN METABOLIC PANEL: CPT

## 2018-04-10 PROCEDURE — 94640 AIRWAY INHALATION TREATMENT: CPT

## 2018-04-10 PROCEDURE — 6370000000 HC RX 637 (ALT 250 FOR IP): Performed by: INTERNAL MEDICINE

## 2018-04-10 PROCEDURE — 99233 SBSQ HOSP IP/OBS HIGH 50: CPT | Performed by: INTERNAL MEDICINE

## 2018-04-10 PROCEDURE — 1200000003 HC TELEMETRY R&B

## 2018-04-10 PROCEDURE — 2580000003 HC RX 258: Performed by: INTERNAL MEDICINE

## 2018-04-10 PROCEDURE — 85025 COMPLETE CBC W/AUTO DIFF WBC: CPT

## 2018-04-10 PROCEDURE — 2700000000 HC OXYGEN THERAPY PER DAY

## 2018-04-10 PROCEDURE — 84100 ASSAY OF PHOSPHORUS: CPT

## 2018-04-10 PROCEDURE — 6370000000 HC RX 637 (ALT 250 FOR IP): Performed by: NURSE PRACTITIONER

## 2018-04-10 RX ORDER — GUAIFENESIN/DEXTROMETHORPHAN 100-10MG/5
5 SYRUP ORAL EVERY 4 HOURS PRN
Status: DISCONTINUED | OUTPATIENT
Start: 2018-04-10 | End: 2018-04-11 | Stop reason: HOSPADM

## 2018-04-10 RX ORDER — BENZONATATE 100 MG/1
200 CAPSULE ORAL 3 TIMES DAILY
Status: DISCONTINUED | OUTPATIENT
Start: 2018-04-10 | End: 2018-04-11 | Stop reason: HOSPADM

## 2018-04-10 RX ORDER — NICOTINE 21 MG/24HR
1 PATCH, TRANSDERMAL 24 HOURS TRANSDERMAL DAILY
Status: DISCONTINUED | OUTPATIENT
Start: 2018-04-10 | End: 2018-04-11 | Stop reason: HOSPADM

## 2018-04-10 RX ADMIN — IPRATROPIUM BROMIDE AND ALBUTEROL SULFATE 1 AMPULE: .5; 3 SOLUTION RESPIRATORY (INHALATION) at 15:50

## 2018-04-10 RX ADMIN — BENZONATATE 200 MG: 100 CAPSULE ORAL at 14:40

## 2018-04-10 RX ADMIN — SODIUM CHLORIDE: 9 INJECTION, SOLUTION INTRAVENOUS at 21:06

## 2018-04-10 RX ADMIN — CEFTRIAXONE SODIUM 2 G: 2 INJECTION, POWDER, FOR SOLUTION INTRAMUSCULAR; INTRAVENOUS at 14:40

## 2018-04-10 RX ADMIN — ACETAMINOPHEN 650 MG: 325 TABLET ORAL at 06:13

## 2018-04-10 RX ADMIN — HYDROXYCHLOROQUINE SULFATE 200 MG: 200 TABLET, FILM COATED ORAL at 08:57

## 2018-04-10 RX ADMIN — PREDNISONE 40 MG: 20 TABLET ORAL at 08:57

## 2018-04-10 RX ADMIN — IPRATROPIUM BROMIDE AND ALBUTEROL SULFATE 1 AMPULE: .5; 3 SOLUTION RESPIRATORY (INHALATION) at 08:36

## 2018-04-10 RX ADMIN — GUAIFENESIN 600 MG: 600 TABLET, EXTENDED RELEASE ORAL at 08:57

## 2018-04-10 RX ADMIN — IPRATROPIUM BROMIDE AND ALBUTEROL SULFATE 1 AMPULE: .5; 3 SOLUTION RESPIRATORY (INHALATION) at 20:05

## 2018-04-10 RX ADMIN — ONDANSETRON 4 MG: 2 INJECTION INTRAMUSCULAR; INTRAVENOUS at 06:14

## 2018-04-10 RX ADMIN — SODIUM CHLORIDE: 9 INJECTION, SOLUTION INTRAVENOUS at 03:03

## 2018-04-10 RX ADMIN — GUAIFENESIN 600 MG: 600 TABLET, EXTENDED RELEASE ORAL at 19:58

## 2018-04-10 RX ADMIN — BENZONATATE 200 MG: 100 CAPSULE ORAL at 19:58

## 2018-04-10 RX ADMIN — BENZONATATE 200 MG: 100 CAPSULE ORAL at 10:30

## 2018-04-10 RX ADMIN — AZITHROMYCIN 500 MG: 250 TABLET, FILM COATED ORAL at 08:57

## 2018-04-10 RX ADMIN — SODIUM PHOSPHATE, MONOBASIC, MONOHYDRATE 30 MMOL: 276; 142 INJECTION, SOLUTION INTRAVENOUS at 10:32

## 2018-04-10 RX ADMIN — HYDROXYCHLOROQUINE SULFATE 200 MG: 200 TABLET, FILM COATED ORAL at 19:58

## 2018-04-10 RX ADMIN — IPRATROPIUM BROMIDE AND ALBUTEROL SULFATE 1 AMPULE: .5; 3 SOLUTION RESPIRATORY (INHALATION) at 12:19

## 2018-04-10 RX ADMIN — ENOXAPARIN SODIUM 40 MG: 40 INJECTION SUBCUTANEOUS at 21:05

## 2018-04-10 ASSESSMENT — PAIN SCALES - GENERAL
PAINLEVEL_OUTOF10: 2
PAINLEVEL_OUTOF10: 0
PAINLEVEL_OUTOF10: 3
PAINLEVEL_OUTOF10: 3

## 2018-04-11 VITALS
DIASTOLIC BLOOD PRESSURE: 69 MMHG | OXYGEN SATURATION: 93 % | SYSTOLIC BLOOD PRESSURE: 134 MMHG | HEART RATE: 97 BPM | BODY MASS INDEX: 18.49 KG/M2 | RESPIRATION RATE: 18 BRPM | WEIGHT: 108.3 LBS | HEIGHT: 64 IN | TEMPERATURE: 98.2 F

## 2018-04-11 LAB
ALBUMIN SERPL-MCNC: 2.8 G/DL (ref 3.5–5.1)
ANION GAP SERPL CALCULATED.3IONS-SCNC: 11 MEQ/L (ref 8–16)
BUN BLDV-MCNC: 3 MG/DL (ref 7–22)
CALCIUM SERPL-MCNC: 8.6 MG/DL (ref 8.5–10.5)
CHLORIDE BLD-SCNC: 98 MEQ/L (ref 98–111)
CO2: 25 MEQ/L (ref 23–33)
CREAT SERPL-MCNC: 0.4 MG/DL (ref 0.4–1.2)
GFR SERPL CREATININE-BSD FRML MDRD: > 90 ML/MIN/1.73M2
GLUCOSE BLD-MCNC: 92 MG/DL (ref 70–108)
GRAM STAIN RESULT: NORMAL
LEGIONELLA URINARY AG: NEGATIVE
LV EF: 55 %
LVEF MODALITY: NORMAL
MAGNESIUM: 1.9 MG/DL (ref 1.6–2.4)
PHOSPHORUS: 2.7 MG/DL (ref 2.4–4.7)
POTASSIUM SERPL-SCNC: 3.2 MEQ/L (ref 3.5–5.2)
RESPIRATORY CULTURE: NORMAL
SODIUM BLD-SCNC: 134 MEQ/L (ref 135–145)

## 2018-04-11 PROCEDURE — 6370000000 HC RX 637 (ALT 250 FOR IP): Performed by: NURSE PRACTITIONER

## 2018-04-11 PROCEDURE — 93306 TTE W/DOPPLER COMPLETE: CPT

## 2018-04-11 PROCEDURE — 83735 ASSAY OF MAGNESIUM: CPT

## 2018-04-11 PROCEDURE — 6370000000 HC RX 637 (ALT 250 FOR IP): Performed by: INTERNAL MEDICINE

## 2018-04-11 PROCEDURE — 99232 SBSQ HOSP IP/OBS MODERATE 35: CPT | Performed by: INTERNAL MEDICINE

## 2018-04-11 PROCEDURE — APPSS45 APP SPLIT SHARED TIME 31-45 MINUTES: Performed by: NURSE PRACTITIONER

## 2018-04-11 PROCEDURE — 2700000000 HC OXYGEN THERAPY PER DAY

## 2018-04-11 PROCEDURE — 6360000002 HC RX W HCPCS: Performed by: NURSE PRACTITIONER

## 2018-04-11 PROCEDURE — 94640 AIRWAY INHALATION TREATMENT: CPT

## 2018-04-11 PROCEDURE — 94761 N-INVAS EAR/PLS OXIMETRY MLT: CPT

## 2018-04-11 PROCEDURE — 80069 RENAL FUNCTION PANEL: CPT

## 2018-04-11 PROCEDURE — 36415 COLL VENOUS BLD VENIPUNCTURE: CPT

## 2018-04-11 PROCEDURE — 2580000003 HC RX 258: Performed by: INTERNAL MEDICINE

## 2018-04-11 RX ORDER — PREDNISONE 20 MG/1
40 TABLET ORAL DAILY
Qty: 6 TABLET | Refills: 0 | Status: SHIPPED | OUTPATIENT
Start: 2018-04-12 | End: 2018-04-11

## 2018-04-11 RX ORDER — POTASSIUM CHLORIDE 20 MEQ/1
40 TABLET, EXTENDED RELEASE ORAL ONCE
Status: COMPLETED | OUTPATIENT
Start: 2018-04-11 | End: 2018-04-11

## 2018-04-11 RX ORDER — FUROSEMIDE 10 MG/ML
20 INJECTION INTRAMUSCULAR; INTRAVENOUS ONCE
Status: COMPLETED | OUTPATIENT
Start: 2018-04-11 | End: 2018-04-11

## 2018-04-11 RX ORDER — DOXYCYCLINE HYCLATE 100 MG
100 TABLET ORAL 2 TIMES DAILY
Qty: 6 TABLET | Refills: 0 | Status: SHIPPED | OUTPATIENT
Start: 2018-04-11 | End: 2018-04-11

## 2018-04-11 RX ORDER — DOXYCYCLINE HYCLATE 100 MG
100 TABLET ORAL 2 TIMES DAILY
Qty: 6 TABLET | Refills: 0 | Status: SHIPPED | OUTPATIENT
Start: 2018-04-11 | End: 2018-04-14

## 2018-04-11 RX ORDER — PREDNISONE 20 MG/1
40 TABLET ORAL DAILY
Qty: 6 TABLET | Refills: 0 | Status: SHIPPED | OUTPATIENT
Start: 2018-04-12 | End: 2018-04-15

## 2018-04-11 RX ADMIN — IPRATROPIUM BROMIDE AND ALBUTEROL SULFATE 1 AMPULE: .5; 3 SOLUTION RESPIRATORY (INHALATION) at 12:16

## 2018-04-11 RX ADMIN — GUAIFENESIN AND DEXTROMETHORPHAN 5 ML: 100; 10 SYRUP ORAL at 01:09

## 2018-04-11 RX ADMIN — Medication 10 ML: at 08:12

## 2018-04-11 RX ADMIN — ACETAMINOPHEN 650 MG: 325 TABLET ORAL at 10:51

## 2018-04-11 RX ADMIN — HYDROXYCHLOROQUINE SULFATE 200 MG: 200 TABLET, FILM COATED ORAL at 09:02

## 2018-04-11 RX ADMIN — FUROSEMIDE 20 MG: 10 INJECTION, SOLUTION INTRAMUSCULAR; INTRAVENOUS at 10:51

## 2018-04-11 RX ADMIN — ACETAMINOPHEN 650 MG: 325 TABLET ORAL at 01:09

## 2018-04-11 RX ADMIN — IPRATROPIUM BROMIDE AND ALBUTEROL SULFATE 1 AMPULE: .5; 3 SOLUTION RESPIRATORY (INHALATION) at 15:42

## 2018-04-11 RX ADMIN — POTASSIUM CHLORIDE 40 MEQ: 1500 TABLET, EXTENDED RELEASE ORAL at 12:42

## 2018-04-11 RX ADMIN — GUAIFENESIN 600 MG: 600 TABLET, EXTENDED RELEASE ORAL at 09:02

## 2018-04-11 RX ADMIN — IPRATROPIUM BROMIDE AND ALBUTEROL SULFATE 1 AMPULE: .5; 3 SOLUTION RESPIRATORY (INHALATION) at 07:40

## 2018-04-11 RX ADMIN — BENZONATATE 200 MG: 100 CAPSULE ORAL at 14:39

## 2018-04-11 RX ADMIN — BENZONATATE 200 MG: 100 CAPSULE ORAL at 09:03

## 2018-04-11 RX ADMIN — ACETAMINOPHEN 650 MG: 325 TABLET ORAL at 05:51

## 2018-04-11 RX ADMIN — PREDNISONE 40 MG: 20 TABLET ORAL at 09:02

## 2018-04-11 RX ADMIN — AZITHROMYCIN 500 MG: 250 TABLET, FILM COATED ORAL at 09:03

## 2018-04-11 ASSESSMENT — PAIN DESCRIPTION - DESCRIPTORS: DESCRIPTORS: HEADACHE

## 2018-04-11 ASSESSMENT — PAIN SCALES - GENERAL
PAINLEVEL_OUTOF10: 3
PAINLEVEL_OUTOF10: 0
PAINLEVEL_OUTOF10: 5
PAINLEVEL_OUTOF10: 0
PAINLEVEL_OUTOF10: 4
PAINLEVEL_OUTOF10: 2
PAINLEVEL_OUTOF10: 3

## 2018-04-11 ASSESSMENT — PAIN DESCRIPTION - LOCATION
LOCATION: BACK
LOCATION: HEAD

## 2018-04-11 ASSESSMENT — PAIN DESCRIPTION - PAIN TYPE
TYPE: CHRONIC PAIN
TYPE: ACUTE PAIN

## 2018-04-11 ASSESSMENT — PAIN DESCRIPTION - FREQUENCY: FREQUENCY: CONTINUOUS

## 2018-04-14 LAB
BLOOD CULTURE, ROUTINE: NORMAL
BLOOD CULTURE, ROUTINE: NORMAL

## 2018-06-28 ENCOUNTER — TELEPHONE (OUTPATIENT)
Dept: PULMONOLOGY | Age: 63
End: 2018-06-28

## 2018-09-12 ENCOUNTER — HOSPITAL ENCOUNTER (OUTPATIENT)
Age: 63
Discharge: HOME | End: 2018-09-12

## 2018-09-12 LAB
ALBUMIN SERPL-MCNC: 4.4 G/DL (ref 3.5–5.1)
ALP BLD-CCNC: 92 U/L (ref 38–126)
ALT SERPL-CCNC: 33 U/L (ref 11–66)
ANION GAP SERPL CALCULATED.3IONS-SCNC: 14 MEQ/L (ref 8–16)
AST SERPL-CCNC: 38 U/L (ref 5–40)
BILIRUB SERPL-MCNC: 0.3 MG/DL (ref 0.3–1.2)
BUN BLDV-MCNC: 4 MG/DL (ref 7–22)
CALCIUM SERPL-MCNC: 9.5 MG/DL (ref 8.5–10.5)
CHLORIDE BLD-SCNC: 93 MEQ/L (ref 98–111)
CO2: 24 MEQ/L (ref 23–33)
CREAT SERPL-MCNC: 0.5 MG/DL (ref 0.4–1.2)
ERYTHROCYTE [DISTWIDTH] IN BLOOD BY AUTOMATED COUNT: 12 % (ref 11.5–14.5)
ERYTHROCYTE [DISTWIDTH] IN BLOOD BY AUTOMATED COUNT: 42 FL (ref 35–45)
GFR SERPL CREATININE-BSD FRML MDRD: > 90 ML/MIN/1.73M2
GLUCOSE BLD-MCNC: 82 MG/DL (ref 70–108)
HCT VFR BLD CALC: 40.6 % (ref 37–47)
HEMOGLOBIN: 14.6 GM/DL (ref 12–16)
MCH RBC QN AUTO: 34.6 PG (ref 26–33)
MCHC RBC AUTO-ENTMCNC: 36 GM/DL (ref 32.2–35.5)
MCV RBC AUTO: 96.2 FL (ref 81–99)
PLATELET # BLD: 287 THOU/MM3 (ref 130–400)
PMV BLD AUTO: 9.5 FL (ref 9.4–12.4)
POTASSIUM SERPL-SCNC: 4.8 MEQ/L (ref 3.5–5.2)
RBC # BLD: 4.22 MILL/MM3 (ref 4.2–5.4)
SODIUM BLD-SCNC: 131 MEQ/L (ref 135–145)
TOTAL PROTEIN: 7.5 G/DL (ref 6.1–8)
WBC # BLD: 4.5 THOU/MM3 (ref 4.8–10.8)

## 2018-09-12 PROCEDURE — 86038 ANTINUCLEAR ANTIBODIES: CPT

## 2018-09-12 PROCEDURE — 36415 COLL VENOUS BLD VENIPUNCTURE: CPT

## 2018-09-12 PROCEDURE — 80053 COMPREHEN METABOLIC PANEL: CPT

## 2018-09-12 PROCEDURE — 85027 COMPLETE CBC AUTOMATED: CPT

## 2018-09-15 LAB — ANA SCREEN: NORMAL

## 2019-10-07 ENCOUNTER — HOSPITAL ENCOUNTER (OUTPATIENT)
Age: 64
Discharge: HOME | End: 2019-10-07

## 2019-10-07 LAB
ALBUMIN SERPL-MCNC: 4.7 G/DL (ref 3.5–5.1)
ALP BLD-CCNC: 87 U/L (ref 38–126)
ALT SERPL-CCNC: 23 U/L (ref 11–66)
ANION GAP SERPL CALCULATED.3IONS-SCNC: 16 MEQ/L (ref 8–16)
AST SERPL-CCNC: 31 U/L (ref 5–40)
BILIRUB SERPL-MCNC: 0.4 MG/DL (ref 0.3–1.2)
BILIRUBIN DIRECT: < 0.2 MG/DL (ref 0–0.3)
BUN BLDV-MCNC: 4 MG/DL (ref 7–22)
CALCIUM SERPL-MCNC: 10.2 MG/DL (ref 8.5–10.5)
CHLORIDE BLD-SCNC: 95 MEQ/L (ref 98–111)
CO2: 25 MEQ/L (ref 23–33)
CREAT SERPL-MCNC: 0.5 MG/DL (ref 0.4–1.2)
ERYTHROCYTE [DISTWIDTH] IN BLOOD BY AUTOMATED COUNT: 12 % (ref 11.5–14.5)
ERYTHROCYTE [DISTWIDTH] IN BLOOD BY AUTOMATED COUNT: 45.9 FL (ref 35–45)
GFR SERPL CREATININE-BSD FRML MDRD: > 90 ML/MIN/1.73M2
GLUCOSE BLD-MCNC: 80 MG/DL (ref 70–108)
HCT VFR BLD CALC: 40.8 % (ref 37–47)
HEMOGLOBIN: 14.3 GM/DL (ref 12–16)
MCH RBC QN AUTO: 36 PG (ref 26–33)
MCHC RBC AUTO-ENTMCNC: 35 GM/DL (ref 32.2–35.5)
MCV RBC AUTO: 102.8 FL (ref 81–99)
PLATELET # BLD: 286 THOU/MM3 (ref 130–400)
PMV BLD AUTO: 9.4 FL (ref 9.4–12.4)
POTASSIUM SERPL-SCNC: 4.5 MEQ/L (ref 3.5–5.2)
RBC # BLD: 3.97 MILL/MM3 (ref 4.2–5.4)
SODIUM BLD-SCNC: 136 MEQ/L (ref 135–145)
TOTAL PROTEIN: 8 G/DL (ref 6.1–8)
WBC # BLD: 5.7 THOU/MM3 (ref 4.8–10.8)

## 2019-10-07 PROCEDURE — 86235 NUCLEAR ANTIGEN ANTIBODY: CPT

## 2019-10-07 PROCEDURE — 86039 ANTINUCLEAR ANTIBODIES (ANA): CPT

## 2019-10-07 PROCEDURE — 36415 COLL VENOUS BLD VENIPUNCTURE: CPT

## 2019-10-07 PROCEDURE — 82248 BILIRUBIN DIRECT: CPT

## 2019-10-07 PROCEDURE — 80053 COMPREHEN METABOLIC PANEL: CPT

## 2019-10-07 PROCEDURE — 85027 COMPLETE CBC AUTOMATED: CPT

## 2019-10-07 PROCEDURE — 86038 ANTINUCLEAR ANTIBODIES: CPT

## 2019-10-10 LAB
ANA SCREEN: DETECTED
ANTI SSA: NORMAL
ANTI SSB: 95 AU/ML (ref 0–40)

## 2019-10-11 LAB
ANA PATTERN: ABNORMAL
ANA TITER: ABNORMAL
ANTINUCLEAR AB INTERPRETIVE COMMENT: ABNORMAL
ANTINUCLEAR ANTIBODY, HEP-2, IGG: DETECTED

## 2019-12-06 ENCOUNTER — HOSPITAL ENCOUNTER (OUTPATIENT)
Age: 64
Discharge: HOME | End: 2019-12-06

## 2019-12-06 LAB
ALBUMIN SERPL-MCNC: 4.1 G/DL (ref 3.5–5.1)
ALP BLD-CCNC: 99 U/L (ref 38–126)
ALT SERPL-CCNC: 32 U/L (ref 11–66)
ANION GAP SERPL CALCULATED.3IONS-SCNC: 17 MEQ/L (ref 8–16)
AST SERPL-CCNC: 42 U/L (ref 5–40)
BILIRUB SERPL-MCNC: 0.4 MG/DL (ref 0.3–1.2)
BUN BLDV-MCNC: 7 MG/DL (ref 7–22)
CALCIUM SERPL-MCNC: 9.7 MG/DL (ref 8.5–10.5)
CHLORIDE BLD-SCNC: 89 MEQ/L (ref 98–111)
CO2: 23 MEQ/L (ref 23–33)
CREAT SERPL-MCNC: 0.4 MG/DL (ref 0.4–1.2)
ERYTHROCYTE [DISTWIDTH] IN BLOOD BY AUTOMATED COUNT: 11.8 % (ref 11.5–14.5)
ERYTHROCYTE [DISTWIDTH] IN BLOOD BY AUTOMATED COUNT: 45.1 FL (ref 35–45)
GFR SERPL CREATININE-BSD FRML MDRD: > 90 ML/MIN/1.73M2
GLUCOSE BLD-MCNC: 88 MG/DL (ref 70–108)
HCT VFR BLD CALC: 39.4 % (ref 37–47)
HEMOGLOBIN: 13.4 GM/DL (ref 12–16)
MCH RBC QN AUTO: 35.5 PG (ref 26–33)
MCHC RBC AUTO-ENTMCNC: 34 GM/DL (ref 32.2–35.5)
MCV RBC AUTO: 104.5 FL (ref 81–99)
PLATELET # BLD: 270 THOU/MM3 (ref 130–400)
PMV BLD AUTO: 10.2 FL (ref 9.4–12.4)
POTASSIUM SERPL-SCNC: 4.7 MEQ/L (ref 3.5–5.2)
RBC # BLD: 3.77 MILL/MM3 (ref 4.2–5.4)
SODIUM BLD-SCNC: 129 MEQ/L (ref 135–145)
TOTAL PROTEIN: 7.5 G/DL (ref 6.1–8)
WBC # BLD: 4.6 THOU/MM3 (ref 4.8–10.8)

## 2019-12-06 PROCEDURE — 36415 COLL VENOUS BLD VENIPUNCTURE: CPT

## 2019-12-06 PROCEDURE — 85027 COMPLETE CBC AUTOMATED: CPT

## 2019-12-06 PROCEDURE — 80053 COMPREHEN METABOLIC PANEL: CPT

## 2020-11-03 PROBLEM — J18.9 PNEUMONIA DUE TO ORGANISM: Status: RESOLVED | Noted: 2020-11-03 | Resolved: 2020-11-03

## 2021-08-30 ENCOUNTER — APPOINTMENT (OUTPATIENT)
Dept: GENERAL RADIOLOGY | Age: 66
DRG: 241 | End: 2021-08-30
Payer: MEDICAID

## 2021-08-30 ENCOUNTER — APPOINTMENT (OUTPATIENT)
Dept: CT IMAGING | Age: 66
DRG: 241 | End: 2021-08-30
Payer: MEDICAID

## 2021-08-30 ENCOUNTER — HOSPITAL ENCOUNTER (INPATIENT)
Age: 66
LOS: 2 days | Discharge: HOME HEALTH | DRG: 241 | End: 2021-09-01
Attending: EMERGENCY MEDICINE | Admitting: PHYSICIAN ASSISTANT
Payer: MEDICAID

## 2021-08-30 DIAGNOSIS — R09.02 HYPOXIA: Primary | ICD-10-CM

## 2021-08-30 DIAGNOSIS — D64.9 ANEMIA, UNSPECIFIED TYPE: ICD-10-CM

## 2021-08-30 DIAGNOSIS — K92.1 MELENA: ICD-10-CM

## 2021-08-30 DIAGNOSIS — E87.1 HYPONATREMIA: ICD-10-CM

## 2021-08-30 DIAGNOSIS — J18.9 PNEUMONIA OF LEFT LOWER LOBE DUE TO INFECTIOUS ORGANISM: ICD-10-CM

## 2021-08-30 LAB
ALBUMIN SERPL-MCNC: 3.1 G/DL (ref 3.5–5.1)
ALP BLD-CCNC: 69 U/L (ref 38–126)
ALT SERPL-CCNC: 25 U/L (ref 11–66)
ANION GAP SERPL CALCULATED.3IONS-SCNC: 10 MEQ/L (ref 8–16)
ANION GAP SERPL CALCULATED.3IONS-SCNC: 10 MEQ/L (ref 8–16)
AST SERPL-CCNC: 29 U/L (ref 5–40)
BACTERIA: ABNORMAL /HPF
BASOPHILS # BLD: 0.1 %
BASOPHILS ABSOLUTE: 0 THOU/MM3 (ref 0–0.1)
BILIRUB SERPL-MCNC: 0.5 MG/DL (ref 0.3–1.2)
BILIRUBIN DIRECT: < 0.2 MG/DL (ref 0–0.3)
BILIRUBIN URINE: NEGATIVE
BLOOD, URINE: ABNORMAL
BUN BLDV-MCNC: 25 MG/DL (ref 7–22)
BUN BLDV-MCNC: 30 MG/DL (ref 7–22)
CALCIUM SERPL-MCNC: 8.7 MG/DL (ref 8.5–10.5)
CALCIUM SERPL-MCNC: 9.5 MG/DL (ref 8.5–10.5)
CASTS 2: ABNORMAL /LPF
CASTS UA: ABNORMAL /LPF
CHARACTER, URINE: CLEAR
CHLORIDE BLD-SCNC: 92 MEQ/L (ref 98–111)
CHLORIDE BLD-SCNC: 96 MEQ/L (ref 98–111)
CO2: 25 MEQ/L (ref 23–33)
CO2: 27 MEQ/L (ref 23–33)
COLOR: ABNORMAL
CREAT SERPL-MCNC: 0.3 MG/DL (ref 0.4–1.2)
CREAT SERPL-MCNC: 0.3 MG/DL (ref 0.4–1.2)
CRYSTALS, UA: ABNORMAL
EKG ATRIAL RATE: 88 BPM
EKG P AXIS: 84 DEGREES
EKG P-R INTERVAL: 142 MS
EKG Q-T INTERVAL: 360 MS
EKG QRS DURATION: 88 MS
EKG QTC CALCULATION (BAZETT): 435 MS
EKG R AXIS: 86 DEGREES
EKG T AXIS: 83 DEGREES
EKG VENTRICULAR RATE: 88 BPM
EOSINOPHIL # BLD: 0.1 %
EOSINOPHILS ABSOLUTE: 0 THOU/MM3 (ref 0–0.4)
EPITHELIAL CELLS, UA: ABNORMAL /HPF
ERYTHROCYTE [DISTWIDTH] IN BLOOD BY AUTOMATED COUNT: 12.1 % (ref 11.5–14.5)
ERYTHROCYTE [DISTWIDTH] IN BLOOD BY AUTOMATED COUNT: 46.5 FL (ref 35–45)
GFR SERPL CREATININE-BSD FRML MDRD: > 90 ML/MIN/1.73M2
GFR SERPL CREATININE-BSD FRML MDRD: > 90 ML/MIN/1.73M2
GLUCOSE BLD-MCNC: 101 MG/DL (ref 70–108)
GLUCOSE BLD-MCNC: 88 MG/DL (ref 70–108)
GLUCOSE URINE: NEGATIVE MG/DL
HCT VFR BLD CALC: 30.2 % (ref 37–47)
HCT VFR BLD CALC: 33.4 % (ref 37–47)
HEMOGLOBIN: 10.1 GM/DL (ref 12–16)
HEMOGLOBIN: 11.2 GM/DL (ref 12–16)
IMMATURE GRANS (ABS): 0.03 THOU/MM3 (ref 0–0.07)
IMMATURE GRANULOCYTES: 0.4 %
KETONES, URINE: ABNORMAL
LACTIC ACID, SEPSIS: 1.4 MMOL/L (ref 0.5–1.9)
LEUKOCYTE ESTERASE, URINE: NEGATIVE
LIPASE: 41.3 U/L (ref 5.6–51.3)
LYMPHOCYTES # BLD: 12.1 %
LYMPHOCYTES ABSOLUTE: 1 THOU/MM3 (ref 1–4.8)
MAGNESIUM: 1.8 MG/DL (ref 1.6–2.4)
MAGNESIUM: 2 MG/DL (ref 1.6–2.4)
MCH RBC QN AUTO: 35.1 PG (ref 26–33)
MCHC RBC AUTO-ENTMCNC: 33.5 GM/DL (ref 32.2–35.5)
MCV RBC AUTO: 104.7 FL (ref 81–99)
MISCELLANEOUS 2: ABNORMAL
MONOCYTES # BLD: 15.5 %
MONOCYTES ABSOLUTE: 1.3 THOU/MM3 (ref 0.4–1.3)
NITRITE, URINE: NEGATIVE
NUCLEATED RED BLOOD CELLS: 0 /100 WBC
OSMOLALITY CALCULATION: 265.3 MOSMOL/KG (ref 275–300)
OSMOLALITY URINE: 679 MOSMOL/KG (ref 250–750)
PH UA: 7.5 (ref 5–9)
PLATELET # BLD: 262 THOU/MM3 (ref 130–400)
PMV BLD AUTO: 9.9 FL (ref 9.4–12.4)
POTASSIUM REFLEX MAGNESIUM: 3.3 MEQ/L (ref 3.5–5.2)
POTASSIUM REFLEX MAGNESIUM: 3.4 MEQ/L (ref 3.5–5.2)
PROTEIN UA: 30
RBC # BLD: 3.19 MILL/MM3 (ref 4.2–5.4)
RBC URINE: ABNORMAL /HPF
RENAL EPITHELIAL, UA: ABNORMAL
SARS-COV-2, NAAT: NOT DETECTED
SEG NEUTROPHILS: 71.8 %
SEGMENTED NEUTROPHILS ABSOLUTE COUNT: 6.1 THOU/MM3 (ref 1.8–7.7)
SODIUM BLD-SCNC: 129 MEQ/L (ref 135–145)
SODIUM BLD-SCNC: 131 MEQ/L (ref 135–145)
SODIUM URINE: < 20 MEQ/L
SPECIFIC GRAVITY, URINE: > 1.03 (ref 1–1.03)
TOTAL PROTEIN: 7.4 G/DL (ref 6.1–8)
TROPONIN T: < 0.01 NG/ML
UROBILINOGEN, URINE: 1 EU/DL (ref 0–1)
WBC # BLD: 8.5 THOU/MM3 (ref 4.8–10.8)
WBC UA: ABNORMAL /HPF
YEAST: ABNORMAL

## 2021-08-30 PROCEDURE — G0378 HOSPITAL OBSERVATION PER HR: HCPCS

## 2021-08-30 PROCEDURE — 96361 HYDRATE IV INFUSION ADD-ON: CPT

## 2021-08-30 PROCEDURE — 6360000002 HC RX W HCPCS: Performed by: STUDENT IN AN ORGANIZED HEALTH CARE EDUCATION/TRAINING PROGRAM

## 2021-08-30 PROCEDURE — 87040 BLOOD CULTURE FOR BACTERIA: CPT

## 2021-08-30 PROCEDURE — 6370000000 HC RX 637 (ALT 250 FOR IP): Performed by: STUDENT IN AN ORGANIZED HEALTH CARE EDUCATION/TRAINING PROGRAM

## 2021-08-30 PROCEDURE — 6360000004 HC RX CONTRAST MEDICATION: Performed by: EMERGENCY MEDICINE

## 2021-08-30 PROCEDURE — 83735 ASSAY OF MAGNESIUM: CPT

## 2021-08-30 PROCEDURE — 36415 COLL VENOUS BLD VENIPUNCTURE: CPT

## 2021-08-30 PROCEDURE — 94669 MECHANICAL CHEST WALL OSCILL: CPT

## 2021-08-30 PROCEDURE — 2580000003 HC RX 258: Performed by: EMERGENCY MEDICINE

## 2021-08-30 PROCEDURE — C9113 INJ PANTOPRAZOLE SODIUM, VIA: HCPCS | Performed by: STUDENT IN AN ORGANIZED HEALTH CARE EDUCATION/TRAINING PROGRAM

## 2021-08-30 PROCEDURE — 2580000003 HC RX 258: Performed by: INTERNAL MEDICINE

## 2021-08-30 PROCEDURE — 6360000002 HC RX W HCPCS: Performed by: INTERNAL MEDICINE

## 2021-08-30 PROCEDURE — 83935 ASSAY OF URINE OSMOLALITY: CPT

## 2021-08-30 PROCEDURE — 6360000002 HC RX W HCPCS: Performed by: EMERGENCY MEDICINE

## 2021-08-30 PROCEDURE — 96375 TX/PRO/DX INJ NEW DRUG ADDON: CPT

## 2021-08-30 PROCEDURE — 71046 X-RAY EXAM CHEST 2 VIEWS: CPT

## 2021-08-30 PROCEDURE — 85014 HEMATOCRIT: CPT

## 2021-08-30 PROCEDURE — 6370000000 HC RX 637 (ALT 250 FOR IP): Performed by: EMERGENCY MEDICINE

## 2021-08-30 PROCEDURE — 96376 TX/PRO/DX INJ SAME DRUG ADON: CPT

## 2021-08-30 PROCEDURE — 96374 THER/PROPH/DIAG INJ IV PUSH: CPT

## 2021-08-30 PROCEDURE — 2580000003 HC RX 258: Performed by: STUDENT IN AN ORGANIZED HEALTH CARE EDUCATION/TRAINING PROGRAM

## 2021-08-30 PROCEDURE — 99223 1ST HOSP IP/OBS HIGH 75: CPT | Performed by: STUDENT IN AN ORGANIZED HEALTH CARE EDUCATION/TRAINING PROGRAM

## 2021-08-30 PROCEDURE — 99284 EMERGENCY DEPT VISIT MOD MDM: CPT

## 2021-08-30 PROCEDURE — 1200000003 HC TELEMETRY R&B

## 2021-08-30 PROCEDURE — 93010 ELECTROCARDIOGRAM REPORT: CPT | Performed by: NUCLEAR MEDICINE

## 2021-08-30 PROCEDURE — 84484 ASSAY OF TROPONIN QUANT: CPT

## 2021-08-30 PROCEDURE — 96365 THER/PROPH/DIAG IV INF INIT: CPT

## 2021-08-30 PROCEDURE — C9113 INJ PANTOPRAZOLE SODIUM, VIA: HCPCS | Performed by: INTERNAL MEDICINE

## 2021-08-30 PROCEDURE — 74177 CT ABD & PELVIS W/CONTRAST: CPT

## 2021-08-30 PROCEDURE — 81001 URINALYSIS AUTO W/SCOPE: CPT

## 2021-08-30 PROCEDURE — 80076 HEPATIC FUNCTION PANEL: CPT

## 2021-08-30 PROCEDURE — 85018 HEMOGLOBIN: CPT

## 2021-08-30 PROCEDURE — 80048 BASIC METABOLIC PNL TOTAL CA: CPT

## 2021-08-30 PROCEDURE — 87635 SARS-COV-2 COVID-19 AMP PRB: CPT

## 2021-08-30 PROCEDURE — C9113 INJ PANTOPRAZOLE SODIUM, VIA: HCPCS | Performed by: EMERGENCY MEDICINE

## 2021-08-30 PROCEDURE — 84300 ASSAY OF URINE SODIUM: CPT

## 2021-08-30 PROCEDURE — 83690 ASSAY OF LIPASE: CPT

## 2021-08-30 PROCEDURE — 85025 COMPLETE CBC W/AUTO DIFF WBC: CPT

## 2021-08-30 PROCEDURE — 93005 ELECTROCARDIOGRAM TRACING: CPT | Performed by: EMERGENCY MEDICINE

## 2021-08-30 PROCEDURE — 83605 ASSAY OF LACTIC ACID: CPT

## 2021-08-30 RX ORDER — PANTOPRAZOLE SODIUM 40 MG/10ML
40 INJECTION, POWDER, LYOPHILIZED, FOR SOLUTION INTRAVENOUS ONCE
Status: COMPLETED | OUTPATIENT
Start: 2021-08-30 | End: 2021-08-30

## 2021-08-30 RX ORDER — PANTOPRAZOLE SODIUM 40 MG/10ML
40 INJECTION, POWDER, LYOPHILIZED, FOR SOLUTION INTRAVENOUS DAILY
Status: DISCONTINUED | OUTPATIENT
Start: 2021-08-30 | End: 2021-08-30

## 2021-08-30 RX ORDER — GUAIFENESIN 600 MG/1
600 TABLET, EXTENDED RELEASE ORAL 2 TIMES DAILY
Status: DISCONTINUED | OUTPATIENT
Start: 2021-08-30 | End: 2021-09-01 | Stop reason: HOSPADM

## 2021-08-30 RX ORDER — PANTOPRAZOLE SODIUM 40 MG/10ML
40 INJECTION, POWDER, LYOPHILIZED, FOR SOLUTION INTRAVENOUS 2 TIMES DAILY
Status: DISCONTINUED | OUTPATIENT
Start: 2021-08-30 | End: 2021-08-30

## 2021-08-30 RX ORDER — SODIUM CHLORIDE 0.9 % (FLUSH) 0.9 %
5-40 SYRINGE (ML) INJECTION EVERY 12 HOURS SCHEDULED
Status: DISCONTINUED | OUTPATIENT
Start: 2021-08-30 | End: 2021-09-01 | Stop reason: HOSPADM

## 2021-08-30 RX ORDER — SUCRALFATE 1 G/1
1 TABLET ORAL EVERY 6 HOURS SCHEDULED
Status: DISCONTINUED | OUTPATIENT
Start: 2021-08-30 | End: 2021-09-01 | Stop reason: HOSPADM

## 2021-08-30 RX ORDER — IPRATROPIUM BROMIDE AND ALBUTEROL SULFATE 2.5; .5 MG/3ML; MG/3ML
1 SOLUTION RESPIRATORY (INHALATION) EVERY 4 HOURS PRN
Status: DISCONTINUED | OUTPATIENT
Start: 2021-08-30 | End: 2021-09-01 | Stop reason: HOSPADM

## 2021-08-30 RX ORDER — SODIUM CHLORIDE 9 MG/ML
25 INJECTION, SOLUTION INTRAVENOUS PRN
Status: DISCONTINUED | OUTPATIENT
Start: 2021-08-30 | End: 2021-09-01 | Stop reason: HOSPADM

## 2021-08-30 RX ORDER — POLYETHYLENE GLYCOL 3350 17 G/17G
17 POWDER, FOR SOLUTION ORAL DAILY PRN
Status: DISCONTINUED | OUTPATIENT
Start: 2021-08-30 | End: 2021-09-01 | Stop reason: HOSPADM

## 2021-08-30 RX ORDER — 0.9 % SODIUM CHLORIDE 0.9 %
1000 INTRAVENOUS SOLUTION INTRAVENOUS ONCE
Status: COMPLETED | OUTPATIENT
Start: 2021-08-30 | End: 2021-08-30

## 2021-08-30 RX ORDER — ONDANSETRON 2 MG/ML
4 INJECTION INTRAMUSCULAR; INTRAVENOUS EVERY 6 HOURS PRN
Status: DISCONTINUED | OUTPATIENT
Start: 2021-08-30 | End: 2021-09-01 | Stop reason: HOSPADM

## 2021-08-30 RX ORDER — POTASSIUM CHLORIDE 7.45 MG/ML
10 INJECTION INTRAVENOUS PRN
Status: DISCONTINUED | OUTPATIENT
Start: 2021-08-30 | End: 2021-09-01 | Stop reason: HOSPADM

## 2021-08-30 RX ORDER — ACETAMINOPHEN 500 MG
500 TABLET ORAL EVERY 6 HOURS PRN
COMMUNITY

## 2021-08-30 RX ORDER — SODIUM CHLORIDE 0.9 % (FLUSH) 0.9 %
5-40 SYRINGE (ML) INJECTION PRN
Status: DISCONTINUED | OUTPATIENT
Start: 2021-08-30 | End: 2021-09-01 | Stop reason: HOSPADM

## 2021-08-30 RX ORDER — LORAZEPAM 1 MG/1
0.5 TABLET ORAL EVERY 6 HOURS PRN
Status: DISCONTINUED | OUTPATIENT
Start: 2021-08-30 | End: 2021-09-01 | Stop reason: HOSPADM

## 2021-08-30 RX ORDER — POTASSIUM CHLORIDE 20 MEQ/1
40 TABLET, EXTENDED RELEASE ORAL PRN
Status: DISCONTINUED | OUTPATIENT
Start: 2021-08-30 | End: 2021-09-01 | Stop reason: HOSPADM

## 2021-08-30 RX ORDER — ACETAMINOPHEN 650 MG/1
650 SUPPOSITORY RECTAL EVERY 6 HOURS PRN
Status: DISCONTINUED | OUTPATIENT
Start: 2021-08-30 | End: 2021-09-01 | Stop reason: HOSPADM

## 2021-08-30 RX ORDER — ACETAMINOPHEN 325 MG/1
650 TABLET ORAL EVERY 6 HOURS PRN
Status: DISCONTINUED | OUTPATIENT
Start: 2021-08-30 | End: 2021-09-01 | Stop reason: HOSPADM

## 2021-08-30 RX ORDER — ONDANSETRON 4 MG/1
4 TABLET, ORALLY DISINTEGRATING ORAL EVERY 8 HOURS PRN
Status: DISCONTINUED | OUTPATIENT
Start: 2021-08-30 | End: 2021-09-01 | Stop reason: HOSPADM

## 2021-08-30 RX ADMIN — SUCRALFATE 1 G: 1 TABLET ORAL at 13:44

## 2021-08-30 RX ADMIN — PANTOPRAZOLE SODIUM 40 MG: 40 INJECTION, POWDER, FOR SOLUTION INTRAVENOUS at 23:34

## 2021-08-30 RX ADMIN — SUCRALFATE 1 G: 1 TABLET ORAL at 20:57

## 2021-08-30 RX ADMIN — ACETAMINOPHEN 650 MG: 325 TABLET ORAL at 22:16

## 2021-08-30 RX ADMIN — IOPAMIDOL 80 ML: 755 INJECTION, SOLUTION INTRAVENOUS at 12:40

## 2021-08-30 RX ADMIN — SODIUM CHLORIDE 1000 ML: 9 INJECTION, SOLUTION INTRAVENOUS at 11:40

## 2021-08-30 RX ADMIN — SODIUM CHLORIDE 8 MG/HR: 9 INJECTION, SOLUTION INTRAVENOUS at 23:45

## 2021-08-30 RX ADMIN — SODIUM CHLORIDE, PRESERVATIVE FREE 10 ML: 5 INJECTION INTRAVENOUS at 20:57

## 2021-08-30 RX ADMIN — CEFTRIAXONE SODIUM 1000 MG: 1 INJECTION, POWDER, FOR SOLUTION INTRAMUSCULAR; INTRAVENOUS at 14:06

## 2021-08-30 RX ADMIN — PANTOPRAZOLE SODIUM 40 MG: 40 INJECTION, POWDER, FOR SOLUTION INTRAVENOUS at 11:40

## 2021-08-30 RX ADMIN — GUAIFENESIN 600 MG: 600 TABLET, EXTENDED RELEASE ORAL at 20:57

## 2021-08-30 RX ADMIN — AZITHROMYCIN DIHYDRATE 500 MG: 500 INJECTION, POWDER, LYOPHILIZED, FOR SOLUTION INTRAVENOUS at 20:57

## 2021-08-30 RX ADMIN — PANTOPRAZOLE SODIUM 40 MG: 40 INJECTION, POWDER, FOR SOLUTION INTRAVENOUS at 20:57

## 2021-08-30 ASSESSMENT — PAIN SCALES - GENERAL
PAINLEVEL_OUTOF10: 5
PAINLEVEL_OUTOF10: 0
PAINLEVEL_OUTOF10: 8

## 2021-08-30 ASSESSMENT — PAIN DESCRIPTION - DESCRIPTORS: DESCRIPTORS: ACHING

## 2021-08-30 ASSESSMENT — PAIN DESCRIPTION - LOCATION: LOCATION: GENERALIZED

## 2021-08-30 ASSESSMENT — PAIN DESCRIPTION - PAIN TYPE
TYPE: ACUTE PAIN
TYPE: ACUTE PAIN

## 2021-08-30 ASSESSMENT — PAIN DESCRIPTION - ORIENTATION: ORIENTATION: OTHER (COMMENT)

## 2021-08-30 NOTE — ED NOTES
Bed: 007A  Expected date: 8/30/21  Expected time: 10:44 AM  Means of arrival: [facility] Dept  Comments:     Sarita Severino RN  08/30/21 [id number]

## 2021-08-30 NOTE — ED NOTES
Pt up in bed with blankets over bottom half. Patient updated on plan of care at this time and expresses no concerns regarding treatment. Patient VSS. Respirations are regular and unlabored, patient is alert and oriented x4. Patient bed rails up x2 and call light within reach. Will continue to monitor.        Martín Spencer RN  08/30/21 [id number]

## 2021-08-30 NOTE — ED NOTES
Patient lying in bed with blankets watching tv at this time. Patient respirations are regular and unlabored. Patient appears to be in no current distress. Patient VSS. Call light is within reach. Patient expresses no needs at this time.        Joaquin Nugent RN  08/30/21 [id number]

## 2021-08-30 NOTE — H&P
Hospitalist - History & Physical      Patient: Jake Villanueva    Unit/Bed:07/007A  YOB: 1955  MRN: [id number]   Acct: [de-identified]   PCP: No primary care provider on file. Date of Service: Pt seen/examined on 08/30/21  and Admitted to [Inpatient] with expected LOS [greater than] two midnights due to medical therapy. Chief Complaint: Generalized weakness with shortness of breath    Assessment and Plan:-  1. Acute hypoxemia  2. Pneumonia, POA  3. Acute exacerbation of COPD  a. CT abdomen pelvis showed right lower lobe pneumonia. b. PA/lateral chest x-ray pending  c. Continue with Rocephin azithromycin. d. Blood cultures, sputum culture, respiratory PCR panel pending.  e. Incentive spirometer, flutter valve, guaifenesin  f. Continue supplemental oxygen 2 L. Wean as tolerated. g. DuoNebs every 4 hours. 4. Severe protein calorie malnutrition with generalized cachexia and bitemporal wasting  a. Dietary consult for nutrition recommendations. b. Regular diet. c. Encourage p.o. intake. d. PT/OT evaluation. 5. Hypotonic, hyponatremia  a. Suspect this is tea and toast syndrome as patient appears malnourished.  b. UA, urine sodium, and urine osmolarity pending.  c. Every 6 hours BMP. Goal correction of up to 8 mEq/L in the next 24 hours. 6. Melena  7. Abdominal pain  a. Patient does have a history of alcohol use, but states that she has not had anything to drink in over a week. b. Denies any anti-inflammatory drugs. c. Complains of abdominal pain as 3-day history of melena. d. Hemoglobin 11.2. Baseline appears to be 13-14.  e. Every 6 hours H&H  f. BID PPI therapy  g. GI consult. Appreciate their assistance. 8. History of lupus  a. Patient has been off of her Plaquenil for an extended period of time due to the pandemic. We will withhold restarting it now in the setting of acute infection.   b. Need outpatient follow-up with her dermatologist to resume treatment for her lupus.    Disposition: Anticipate discharge in 24 to 48 hours    History Of Present Illness:    Patient is a 82-year-old female with history of COPD, discoid lupus, and tobacco use presents to the hospital generalized weakness and lethargy. She states that she is also experienced shortness of breath. These symptoms have been ongoing for several weeks, but have worsened over the last few days. She states that she has been unable to follow-up with her dermatologist for treatment for her discoid lupus during the pandemic. She has run out of her Plaquenil as well as her topical cream.  Her rash has worsened since this time. She also complains of shortness of breath as well as a cough with some productive sputum. Denies any fevers, but admits to subjective chills. Denies any chest pain, nausea, or vomiting. She also complains of periumbilical and epigastric pain as well as 3-day history of black stools. Denies any use of NSAIDs or recent alcohol use. She does not take any antiplatelet or anticoagulation. Past Medical History:        Diagnosis Date    Colonoscopy refused     could not afford it.  Discoid lupus 2013    Seen Dr. Kayleigh Jin. Bx discoid lupus. Under treatment. KAITLIN normal. Lesion on scalp. Patient on Plaquenil and steroid cream     Lump in chest     Lt side of the chest 5 x 5 cm cyst    Pneumonia     Tobacco abuse disorder        Past Surgical History:        Procedure Laterality Date     SECTION      OVARY REMOVAL      left       Home Medications:   No current facility-administered medications on file prior to encounter.      Current Outpatient Medications on File Prior to Encounter   Medication Sig Dispense Refill    Lwmviooxe-ZIC-TZ-APAP (TYLENOL COLD HEAD CONGESTION) 5-2- MG MISC Take by mouth as needed (cold symptoms)       clobetasol (TEMOVATE) 0.05 % cream APPLY TO RASH AREAS TWICE DAILY  2    fluocinonide (LIDEX) 0.05 % gel       hydroxychloroquine (PLAQUENIL) 200 MG tablet Take 1 tablet by mouth 2 times daily          Allergies:    Patient has no known allergies. Social History:    reports that she has been smoking cigarettes. She started smoking about 48 years ago. She has been smoking about 1.00 pack per day. She has never used smokeless tobacco. She reports current alcohol use of about 3.0 standard drinks of alcohol per week. She reports that she does not use drugs. Family History:       Problem Relation Age of Onset    Substance Abuse Father     Diabetes Mother        Diet:  ADULT DIET; Regular    Review of systems:   Pertinent positives as noted in the HPI. All other systems reviewed and negative. PHYSICAL EXAM:  /80   Pulse 97   Temp 97.8 °F (36.6 °C) (Oral)   Resp 19   SpO2 95%   General appearance: Chronically ill-appearing female who appears older than her stated age. HEENT: Normal cephalic, atraumatic without obvious deformity. Pupils equal, round, and reactive to light. Extra ocular muscles intact. Conjunctivae/corneas clear. Neck: Supple, with full range of motion. No jugular venous distention. Trachea midline. Respiratory: Diminished breath sounds throughout all lung fields. No appreciable wheezes. Mild rhonchi noted. Cardiovascular: Regular rate and rhythm with normal S1/S2 without murmurs, rubs or gallops. Abdomen: Thin and scaphoid in appearance. There is mild tenderness to palpation in the suprapubic and periumbilical region. No rebound tenderness. Hypoactive bowel sounds. Musculoskeletal: Thin and frail appearing with generalized cachexia and bitemporal wasting. Skin: Diffuse scaly rash consistent with history of discoid lupus noted throughout  Neurologic:  Neurovascularly intact without any focal sensory/motor deficits.  Cranial nerves: II-XII intact, grossly non-focal.  Psychiatric: Alert and oriented, thought content appropriate, normal insight  Capillary Refill: Brisk,< 3 seconds Peripheral Pulses: +2 palpable, equal bilaterally     Labs:   Recent Labs     08/30/21  1145   WBC 8.5   HGB 11.2*   HCT 33.4*        Recent Labs     08/30/21  1145   *   K 3.4*   CL 92*   CO2 27   BUN 30*   CREATININE 0.3*   CALCIUM 9.5     Recent Labs     08/30/21  1145   AST 29   ALT 25   BILIDIR <0.2   BILITOT 0.5   ALKPHOS 69     No results for input(s): INR in the last 72 hours. No results for input(s): Jennifer Height in the last 72 hours. Urinalysis:    Lab Results   Component Value Date    BLOODU trace 10/01/2015    SPECGRAV 1.010 10/01/2015    GLUCOSEU negative 10/01/2015       Radiology:   CT ABDOMEN PELVIS W IV CONTRAST Additional Contrast? None   Final Result   1. Left lower lung consolidation/infiltrate. 2. No acute abdominal or pelvic findings. 3. No bowel obstruction. 4. Steatosis of the liver. **This report has been created using voice recognition software. It may contain minor errors which are inherent in voice recognition technology. **      Final report electronically signed by Dr. Brant Sosa on 8/30/2021 1:03 PM      XR CHEST (2 VW)    (Results Pending)     CT ABDOMEN PELVIS W IV CONTRAST Additional Contrast? None    Result Date: 8/30/2021  PROCEDURE: CT ABDOMEN PELVIS W IV CONTRAST CLINICAL INFORMATION: abd pain, weight loss COMPARISON: No prior study. TECHNIQUE: 5 mm axial imaging through the abdomen and pelvis with IV contrast.  Coronal and sagittal reconstructions were performed. All CT scans at this facility use dose modulation, iterative reconstruction, and/or weight based dosing when appropriate to reduce the radiation dose to as low as reasonably achievable. CONTRAST: Isovue 370, 80 mL FINDINGS: LUNG BASES: Left lower lung consolidation and infiltrate. No effusion. LIVER/GALLBLADDER/BILIARY TREE: Decreased attenuation of the liver. No enhancing liver lesions. No gallstones. PANCREAS/SPLEEN: Pancreas unremarkable. Splenic granuloma.  ADRENAL GLANDS/KIDNEYS: Unremarkable. BOWEL: 1. Nonobstructive. 2. Stool scattered in the colon. 3. Normal appendix. FREE AIR/FREE FLUID/INFLAMMATION: None. LYMPHADENOPATHY: 1. No pathologically enlarged lymph nodes. ABDOMINAL AORTA: Unremarkable. PELVIS: 1. Unremarkable. ABDOMINAL WALL: Unremarkable. MUSCULOSKELETAL: Unremarkable. OTHER: None. 1. Left lower lung consolidation/infiltrate. 2. No acute abdominal or pelvic findings. 3. No bowel obstruction. 4. Steatosis of the liver. **This report has been created using voice recognition software. It may contain minor errors which are inherent in voice recognition technology. ** Final report electronically signed by Dr. Erminia Opitz on 8/30/2021 1:03 PM      Electronically signed by Drew Barnes DO on 8/30/2021 at 3:09 PM

## 2021-08-31 ENCOUNTER — ANESTHESIA EVENT (OUTPATIENT)
Dept: ENDOSCOPY | Age: 66
DRG: 241 | End: 2021-08-31
Payer: MEDICAID

## 2021-08-31 ENCOUNTER — ANESTHESIA (OUTPATIENT)
Dept: ENDOSCOPY | Age: 66
DRG: 241 | End: 2021-08-31
Payer: MEDICAID

## 2021-08-31 VITALS
DIASTOLIC BLOOD PRESSURE: 54 MMHG | SYSTOLIC BLOOD PRESSURE: 109 MMHG | RESPIRATION RATE: 16 BRPM | OXYGEN SATURATION: 100 %

## 2021-08-31 PROBLEM — K25.4 GASTRIC ULCER WITH HEMORRHAGE: Chronic | Status: ACTIVE | Noted: 2021-08-31

## 2021-08-31 LAB
ABSOLUTE RETIC #: 15 THOU/MM3 (ref 20–115)
ANION GAP SERPL CALCULATED.3IONS-SCNC: 10 MEQ/L (ref 8–16)
ANION GAP SERPL CALCULATED.3IONS-SCNC: 11 MEQ/L (ref 8–16)
BORDETELLA PARAPERTUSSIS BY PCR: NOT DETECTED
BORDETELLA PERTUSSIS BY PCR: NOT DETECTED
BUN BLDV-MCNC: 25 MG/DL (ref 7–22)
BUN BLDV-MCNC: 28 MG/DL (ref 7–22)
CALCIUM SERPL-MCNC: 8.5 MG/DL (ref 8.5–10.5)
CALCIUM SERPL-MCNC: 8.7 MG/DL (ref 8.5–10.5)
CHLORIDE BLD-SCNC: 98 MEQ/L (ref 98–111)
CHLORIDE BLD-SCNC: 99 MEQ/L (ref 98–111)
CO2: 22 MEQ/L (ref 23–33)
CO2: 22 MEQ/L (ref 23–33)
CREAT SERPL-MCNC: 0.2 MG/DL (ref 0.4–1.2)
CREAT SERPL-MCNC: 0.3 MG/DL (ref 0.4–1.2)
FERRITIN: 1336 NG/ML (ref 10–291)
FILM ARRAY ADENOVIRUS: NOT DETECTED
FILM ARRAY CHLAMYDOPHILIA PNEUMONIAE: NOT DETECTED
FILM ARRAY CORONAVIRUS 229E: NOT DETECTED
FILM ARRAY CORONAVIRUS HKU1: NOT DETECTED
FILM ARRAY CORONAVIRUS NL63: NOT DETECTED
FILM ARRAY CORONAVIRUS OC43: NOT DETECTED
FILM ARRAY INFLUENZA A VIRUS: NOT DETECTED
FILM ARRAY INFLUENZA B: NOT DETECTED
FILM ARRAY METAPNEUMOVIRUS: NOT DETECTED
FILM ARRAY MYCOPLASMA PNEUMONIAE: NOT DETECTED
FILM ARRAY PARAINFLUENZA VIRUS 1: NOT DETECTED
FILM ARRAY PARAINFLUENZA VIRUS 2: NOT DETECTED
FILM ARRAY PARAINFLUENZA VIRUS 3: NOT DETECTED
FILM ARRAY PARAINFLUENZA VIRUS 4: NOT DETECTED
FILM ARRAY RESPIRATORY SYNCITIAL VIRUS: NOT DETECTED
FILM ARRAY RHINOVIRUS/ENTEROVIRUS: NOT DETECTED
FOLATE: 6.8 NG/ML (ref 4.8–24.2)
GFR SERPL CREATININE-BSD FRML MDRD: > 90 ML/MIN/1.73M2
GFR SERPL CREATININE-BSD FRML MDRD: > 90 ML/MIN/1.73M2
GLUCOSE BLD-MCNC: 80 MG/DL (ref 70–108)
GLUCOSE BLD-MCNC: 87 MG/DL (ref 70–108)
HCT VFR BLD CALC: 27.9 % (ref 37–47)
HCT VFR BLD CALC: 30.2 % (ref 37–47)
HEMOGLOBIN: 9.1 GM/DL (ref 12–16)
HEMOGLOBIN: 9.6 GM/DL (ref 12–16)
IMMATURE RETIC FRACT: 10.4 % (ref 3–15.9)
INR BLD: 1.05 (ref 0.85–1.13)
IRON SATURATION: 57 % (ref 20–50)
IRON: 77 UG/DL (ref 50–170)
MAGNESIUM: 1.8 MG/DL (ref 1.6–2.4)
MAGNESIUM: 1.8 MG/DL (ref 1.6–2.4)
POTASSIUM REFLEX MAGNESIUM: 3.1 MEQ/L (ref 3.5–5.2)
POTASSIUM REFLEX MAGNESIUM: 3.4 MEQ/L (ref 3.5–5.2)
RETIC HEMOGLOBIN: 37 PG (ref 28.2–35.7)
RETICULOCYTE ABSOLUTE COUNT: 0.6 % (ref 0.5–2)
SARS-COV-2, PCR: NOT DETECTED
SODIUM BLD-SCNC: 131 MEQ/L (ref 135–145)
SODIUM BLD-SCNC: 131 MEQ/L (ref 135–145)
TOTAL IRON BINDING CAPACITY: 135 UG/DL (ref 171–450)
VITAMIN B-12: 814 PG/ML (ref 211–911)

## 2021-08-31 PROCEDURE — 2580000003 HC RX 258: Performed by: INTERNAL MEDICINE

## 2021-08-31 PROCEDURE — 85014 HEMATOCRIT: CPT

## 2021-08-31 PROCEDURE — 83550 IRON BINDING TEST: CPT

## 2021-08-31 PROCEDURE — 2580000003 HC RX 258: Performed by: PHYSICIAN ASSISTANT

## 2021-08-31 PROCEDURE — 3609012400 HC EGD TRANSORAL BIOPSY SINGLE/MULTIPLE: Performed by: INTERNAL MEDICINE

## 2021-08-31 PROCEDURE — 2709999900 HC NON-CHARGEABLE SUPPLY: Performed by: INTERNAL MEDICINE

## 2021-08-31 PROCEDURE — C9113 INJ PANTOPRAZOLE SODIUM, VIA: HCPCS | Performed by: INTERNAL MEDICINE

## 2021-08-31 PROCEDURE — 88342 IMHCHEM/IMCYTCHM 1ST ANTB: CPT

## 2021-08-31 PROCEDURE — 0202U NFCT DS 22 TRGT SARS-COV-2: CPT

## 2021-08-31 PROCEDURE — 82746 ASSAY OF FOLIC ACID SERUM: CPT

## 2021-08-31 PROCEDURE — G0378 HOSPITAL OBSERVATION PER HR: HCPCS

## 2021-08-31 PROCEDURE — 0DB68ZX EXCISION OF STOMACH, VIA NATURAL OR ARTIFICIAL OPENING ENDOSCOPIC, DIAGNOSTIC: ICD-10-PCS | Performed by: INTERNAL MEDICINE

## 2021-08-31 PROCEDURE — 82728 ASSAY OF FERRITIN: CPT

## 2021-08-31 PROCEDURE — 83540 ASSAY OF IRON: CPT

## 2021-08-31 PROCEDURE — 36415 COLL VENOUS BLD VENIPUNCTURE: CPT

## 2021-08-31 PROCEDURE — 82607 VITAMIN B-12: CPT

## 2021-08-31 PROCEDURE — 2720000010 HC SURG SUPPLY STERILE: Performed by: INTERNAL MEDICINE

## 2021-08-31 PROCEDURE — 6360000002 HC RX W HCPCS: Performed by: INTERNAL MEDICINE

## 2021-08-31 PROCEDURE — 2580000003 HC RX 258: Performed by: STUDENT IN AN ORGANIZED HEALTH CARE EDUCATION/TRAINING PROGRAM

## 2021-08-31 PROCEDURE — 6360000002 HC RX W HCPCS: Performed by: STUDENT IN AN ORGANIZED HEALTH CARE EDUCATION/TRAINING PROGRAM

## 2021-08-31 PROCEDURE — 7100000010 HC PHASE II RECOVERY - FIRST 15 MIN: Performed by: INTERNAL MEDICINE

## 2021-08-31 PROCEDURE — 85610 PROTHROMBIN TIME: CPT

## 2021-08-31 PROCEDURE — 88305 TISSUE EXAM BY PATHOLOGIST: CPT

## 2021-08-31 PROCEDURE — 1200000003 HC TELEMETRY R&B

## 2021-08-31 PROCEDURE — 3700000001 HC ADD 15 MINUTES (ANESTHESIA): Performed by: INTERNAL MEDICINE

## 2021-08-31 PROCEDURE — 6370000000 HC RX 637 (ALT 250 FOR IP): Performed by: STUDENT IN AN ORGANIZED HEALTH CARE EDUCATION/TRAINING PROGRAM

## 2021-08-31 PROCEDURE — 2500000003 HC RX 250 WO HCPCS: Performed by: NURSE ANESTHETIST, CERTIFIED REGISTERED

## 2021-08-31 PROCEDURE — 6370000000 HC RX 637 (ALT 250 FOR IP): Performed by: PHYSICIAN ASSISTANT

## 2021-08-31 PROCEDURE — 80048 BASIC METABOLIC PNL TOTAL CA: CPT

## 2021-08-31 PROCEDURE — 94669 MECHANICAL CHEST WALL OSCILL: CPT

## 2021-08-31 PROCEDURE — 85018 HEMOGLOBIN: CPT

## 2021-08-31 PROCEDURE — 85046 RETICYTE/HGB CONCENTRATE: CPT

## 2021-08-31 PROCEDURE — 3700000000 HC ANESTHESIA ATTENDED CARE: Performed by: INTERNAL MEDICINE

## 2021-08-31 PROCEDURE — 6360000002 HC RX W HCPCS: Performed by: NURSE ANESTHETIST, CERTIFIED REGISTERED

## 2021-08-31 PROCEDURE — 99232 SBSQ HOSP IP/OBS MODERATE 35: CPT | Performed by: PHYSICIAN ASSISTANT

## 2021-08-31 PROCEDURE — 83735 ASSAY OF MAGNESIUM: CPT

## 2021-08-31 RX ORDER — CLOBETASOL PROPIONATE 0.5 MG/G
CREAM TOPICAL 2 TIMES DAILY
Status: DISCONTINUED | OUTPATIENT
Start: 2021-08-31 | End: 2021-09-01 | Stop reason: HOSPADM

## 2021-08-31 RX ORDER — PANTOPRAZOLE SODIUM 40 MG/1
40 TABLET, DELAYED RELEASE ORAL
Status: DISCONTINUED | OUTPATIENT
Start: 2021-09-01 | End: 2021-09-01 | Stop reason: HOSPADM

## 2021-08-31 RX ORDER — PANTOPRAZOLE SODIUM 40 MG/1
40 TABLET, DELAYED RELEASE ORAL
Qty: 90 TABLET | Refills: 0 | Status: SHIPPED | OUTPATIENT
Start: 2021-09-01

## 2021-08-31 RX ORDER — SODIUM CHLORIDE, SODIUM LACTATE, POTASSIUM CHLORIDE, CALCIUM CHLORIDE 600; 310; 30; 20 MG/100ML; MG/100ML; MG/100ML; MG/100ML
INJECTION, SOLUTION INTRAVENOUS CONTINUOUS
Status: DISCONTINUED | OUTPATIENT
Start: 2021-08-31 | End: 2021-09-01 | Stop reason: HOSPADM

## 2021-08-31 RX ORDER — PROPOFOL 10 MG/ML
INJECTION, EMULSION INTRAVENOUS PRN
Status: DISCONTINUED | OUTPATIENT
Start: 2021-08-31 | End: 2021-08-31 | Stop reason: SDUPTHER

## 2021-08-31 RX ORDER — SUCRALFATE 1 G/1
1 TABLET ORAL
Qty: 60 TABLET | Refills: 0 | Status: SHIPPED | OUTPATIENT
Start: 2021-08-31 | End: 2021-09-10 | Stop reason: ALTCHOICE

## 2021-08-31 RX ORDER — LIDOCAINE HYDROCHLORIDE 20 MG/ML
INJECTION, SOLUTION EPIDURAL; INFILTRATION; INTRACAUDAL; PERINEURAL PRN
Status: DISCONTINUED | OUTPATIENT
Start: 2021-08-31 | End: 2021-08-31 | Stop reason: SDUPTHER

## 2021-08-31 RX ADMIN — SODIUM CHLORIDE, PRESERVATIVE FREE 10 ML: 5 INJECTION INTRAVENOUS at 21:39

## 2021-08-31 RX ADMIN — PROPOFOL 80 MG: 10 INJECTION, EMULSION INTRAVENOUS at 13:57

## 2021-08-31 RX ADMIN — CEFTRIAXONE SODIUM 1000 MG: 1 INJECTION, POWDER, FOR SOLUTION INTRAMUSCULAR; INTRAVENOUS at 15:03

## 2021-08-31 RX ADMIN — SODIUM CHLORIDE, POTASSIUM CHLORIDE, SODIUM LACTATE AND CALCIUM CHLORIDE: 600; 310; 30; 20 INJECTION, SOLUTION INTRAVENOUS at 10:39

## 2021-08-31 RX ADMIN — AZITHROMYCIN DIHYDRATE 500 MG: 500 INJECTION, POWDER, LYOPHILIZED, FOR SOLUTION INTRAVENOUS at 21:39

## 2021-08-31 RX ADMIN — SODIUM CHLORIDE, POTASSIUM CHLORIDE, SODIUM LACTATE AND CALCIUM CHLORIDE: 600; 310; 30; 20 INJECTION, SOLUTION INTRAVENOUS at 20:45

## 2021-08-31 RX ADMIN — POTASSIUM CHLORIDE 40 MEQ: 20 TABLET, EXTENDED RELEASE ORAL at 08:48

## 2021-08-31 RX ADMIN — SODIUM CHLORIDE 8 MG/HR: 9 INJECTION, SOLUTION INTRAVENOUS at 09:52

## 2021-08-31 RX ADMIN — GUAIFENESIN 600 MG: 600 TABLET, EXTENDED RELEASE ORAL at 21:39

## 2021-08-31 RX ADMIN — LIDOCAINE HYDROCHLORIDE 50 MG: 20 INJECTION, SOLUTION EPIDURAL; INFILTRATION; INTRACAUDAL; PERINEURAL at 13:57

## 2021-08-31 RX ADMIN — CLOBETASOL PROPIONATE: 0.5 CREAM TOPICAL at 21:39

## 2021-08-31 RX ADMIN — ACETAMINOPHEN 650 MG: 325 TABLET ORAL at 09:02

## 2021-08-31 RX ADMIN — SUCRALFATE 1 G: 1 TABLET ORAL at 18:11

## 2021-08-31 RX ADMIN — GUAIFENESIN 600 MG: 600 TABLET, EXTENDED RELEASE ORAL at 08:44

## 2021-08-31 RX ADMIN — SUCRALFATE 1 G: 1 TABLET ORAL at 05:25

## 2021-08-31 RX ADMIN — ACETAMINOPHEN 650 MG: 325 TABLET ORAL at 22:50

## 2021-08-31 ASSESSMENT — PAIN - FUNCTIONAL ASSESSMENT: PAIN_FUNCTIONAL_ASSESSMENT: 0-10

## 2021-08-31 ASSESSMENT — PAIN SCALES - GENERAL
PAINLEVEL_OUTOF10: 0
PAINLEVEL_OUTOF10: 7

## 2021-08-31 ASSESSMENT — COPD QUESTIONNAIRES: CAT_SEVERITY: MODERATE

## 2021-08-31 ASSESSMENT — ENCOUNTER SYMPTOMS: DYSPNEA ACTIVITY LEVEL: AFTER AMBULATING 1 FLIGHT OF STAIRS

## 2021-08-31 ASSESSMENT — LIFESTYLE VARIABLES: SMOKING_STATUS: 1

## 2021-08-31 NOTE — ANESTHESIA PRE PROCEDURE
Department of Anesthesiology  Preprocedure Note       Name:  Lynda Woodson   Age:  63 y.o.  :  1955                                          MRN:  [id number]         Date:  2021      Surgeon: Shayna Weber):  Loc To MD    Procedure: Procedure(s):  EGD ESOPHAGOGASTRODUODENOSCOPY    Medications prior to admission:   Prior to Admission medications    Medication Sig Start Date End Date Taking?  Authorizing Provider   acetaminophen (TYLENOL) 500 MG tablet Take 500 mg by mouth every 6 hours as needed for Pain or Fever   Yes Historical Provider, MD   Tjbrjgpdt-XAK-WY-APAP ( [facility]) 5-2- MG MISC Take by mouth as needed (cold symptoms)    Yes Historical Provider, MD   clobetasol (TEMOVATE) 0.05 % cream APPLY TO RASH AREAS TWICE DAILY 10/13/16  Yes Historical Provider, MD   fluocinonide (LIDEX) 0.05 % gel  14  Yes Historical Provider, MD   hydroxychloroquine (PLAQUENIL) 200 MG tablet Take 1 tablet by mouth 2 times daily  12/10/13  Yes Historical Provider, MD       Current medications:    Current Facility-Administered Medications   Medication Dose Route Frequency Provider Last Rate Last Admin    lactated ringers infusion   IntraVENous Continuous JOHANN Beck 100 mL/hr at 21 1039 New Bag at 21 1039    sucralfate (CARAFATE) tablet 1 g  1 g Oral 4 times per day Umm Caputo DO   1 g at 21 0525    sodium chloride flush 0.9 % injection 5-40 mL  5-40 mL IntraVENous 2 times per day Umm Caputo DO   10 mL at 21 205    sodium chloride flush 0.9 % injection 5-40 mL  5-40 mL IntraVENous PRN Umm Caputo DO        0.9 % sodium chloride infusion  25 mL IntraVENous PRN mUm Caputo DO        [Held by provider] enoxaparin (LOVENOX) injection 40 mg  40 mg SubCUTAneous Daily Umm Caputo DO        ondansetron (ZOFRAN-ODT) disintegrating tablet 4 mg  4 mg Oral Q8H PRN Umm Caputo DO        Or    ondansetron [facility]) injection 4 mg  4 mg IntraVENous Q6H PRN Pablo Lien, DO        polyethylene glycol (GLYCOLAX) packet 17 g  17 g Oral Daily PRN Pablo Lien, DO        acetaminophen (TYLENOL) tablet 650 mg  650 mg Oral Q6H PRN Apblo Lien, DO   650 mg at 08/31/21 6383    Or    acetaminophen (TYLENOL) suppository 650 mg  650 mg Rectal Q6H PRN Pablo Lien, DO        potassium chloride (KLOR-CON M) extended release tablet 40 mEq  40 mEq Oral PRN Pablo Lien, DO   40 mEq at 08/31/21 0848    Or    potassium bicarb-citric acid (EFFER-K) effervescent tablet 40 mEq  40 mEq Oral PRN Pablo Lien, DO        Or    potassium chloride 10 mEq/100 mL IVPB (Peripheral Line)  10 mEq IntraVENous PRN Pablo Lien, DO        LORazepam (ATIVAN) tablet 0.5 mg  0.5 mg Oral Q6H PRN Pablo Lien, DO        cefTRIAXone (ROCEPHIN) 1000 mg IVPB in 50 mL D5W minibag  1,000 mg IntraVENous Q24H Pablo Lien, DO        ipratropium-albuterol (DUONEB) nebulizer solution 1 ampule  1 ampule Inhalation Q4H PRN Pablo Lien, DO        guaiFENesin [facility]) extended release tablet 600 mg  600 mg Oral BID Pablo Lien, DO   600 mg at 08/31/21 0844    azithromycin (ZITHROMAX) 500 mg in D5W 250ml addavial  500 mg IntraVENous Q24H Pablo Lien, DO   Stopped at 08/30/21 2157    pantoprazole (PROTONIX) 80 mg in sodium chloride 0.9 % 100 mL infusion  8 mg/hr IntraVENous Continuous Little Alvarez MD 10 mL/hr at 08/31/21 5443 8 mg/hr at 08/31/21 2086       Allergies:  No Known Allergies    Problem List:    Patient Active Problem List   Diagnosis Code    Tobacco abuse disorder Z72.0    Discoid lupus L93.0    Hematuria R31.9    Weight loss R63.4    Cyst of right ovary N83.201    COPD with exacerbation ([address] J44.1    Mediastinal lymphadenopathy R59.0    Pneumonitis J18.9    Hyponatremia E87.1    Thyroid nodules - needs outpatient thyroid US for further evaluation E04.1    Interstitial pneumonia ([address] J84.9    Pneumonia J18.9 Past Medical History:        Diagnosis Date    Colonoscopy refused     could not afford it.  Discoid lupus 2013    Seen Dr. Maurice Kanner. Bx discoid lupus. Under treatment. KAITLIN normal. Lesion on scalp. Patient on Plaquenil and steroid cream     Lump in chest     Lt side of the chest 5 x 5 cm cyst    Pneumonia     Tobacco abuse disorder        Past Surgical History:        Procedure Laterality Date     SECTION      OVARY REMOVAL      left       Social History:    Social History     Tobacco Use    Smoking status: Current Every Day Smoker     Packs/day: 1.00     Types: Cigarettes     Start date:     Smokeless tobacco: Never Used   Substance Use Topics    Alcohol use: Yes     Alcohol/week: 3.0 standard drinks     Types: 3 Cans of beer per week     Comment: pt states she drinks around 8/9pm and drinks 2-3 beers                                Ready to quit: Not Answered  Counseling given: Not Answered      Vital Signs (Current):   Vitals:    21 0336 21 0845 21 1128 21 1247   BP: (!) 102/58 (!) 101/56 101/61 (!) 116/58   Pulse: 60 64 83 66   Resp: 18 18 18 16   Temp: 36.7 °C (98.1 °F) 36.9 °C (98.5 °F) 36.7 °C (98.1 °F)    TempSrc: Oral Oral Oral    SpO2: 100% 98% 94% 97%   Weight: 66 lb 8 oz (30.2 kg)      Height:                                                  BP Readings from Last 3 Encounters:   21 (!) 116/58   18 134/69   17 136/72       NPO Status: Time of last liquid consumption: 800                        Time of last solid consumption:                         Date of last liquid consumption: 21                        Date of last solid food consumption: 21    BMI:   Wt Readings from Last 3 Encounters:   21 66 lb 8 oz (30.2 kg)   18 108 lb 4.8 oz (49.1 kg)   17 96 lb 6.4 oz (43.7 kg)     Body mass index is 11.41 kg/m².     CBC:   Lab Results   Component Value Date    WBC 8.5 2021    RBC 3.19 08/30/2021    HGB 9.1 08/31/2021    HCT 27.9 08/31/2021    .7 08/30/2021    RDW 13.0 04/10/2018     08/30/2021       CMP:   Lab Results   Component Value Date     08/31/2021    K 3.1 08/31/2021    CL 98 08/31/2021    CO2 22 08/31/2021    BUN 25 08/31/2021    CREATININE 0.2 08/31/2021    GFRAA >60 01/11/2017    LABGLOM >90 08/31/2021    GLUCOSE 87 08/31/2021    PROT 7.4 08/30/2021    CALCIUM 8.7 08/31/2021    BILITOT 0.5 08/30/2021    ALKPHOS 69 08/30/2021    AST 29 08/30/2021    ALT 25 08/30/2021       POC Tests: No results for input(s): POCGLU, POCNA, POCK, POCCL, POCBUN, POCHEMO, POCHCT in the last 72 hours. Coags:   Lab Results   Component Value Date    INR 1.05 08/31/2021    APTT 28.0 04/08/2018       HCG (If Applicable): No results found for: PREGTESTUR, PREGSERUM, HCG, HCGQUANT     ABGs: No results found for: PHART, PO2ART, CXF1HEY, ICJ9TYP, BEART, X2IWNBAP     Type & Screen (If Applicable):  No results found for: LABABO, LABRH    Drug/Infectious Status (If Applicable):  No results found for: HIV, HEPCAB    COVID-19 Screening (If Applicable):   Lab Results   Component Value Date    COVID19 NOT DETECTED 08/30/2021           Anesthesia Evaluation    Airway: Mallampati: II        Dental:    (+) upper dentures and lower dentures      Pulmonary:normal exam    (+) pneumonia: unresolved,  COPD: moderate,  current smoker          Patient did not smoke on day of surgery. Cardiovascular:    (+) CANAS: after ambulating 1 flight of stairs,                   Neuro/Psych:   Negative Neuro/Psych ROS              GI/Hepatic/Renal: Neg GI/Hepatic/Renal ROS            Endo/Other: Negative Endo/Other ROS                    Abdominal:             Vascular: negative vascular ROS. Other Findings:             Anesthesia Plan      MAC     ASA 3       Induction: intravenous. Anesthetic plan and risks discussed with patient.                       ADRIEN Nicole - CRNA

## 2021-08-31 NOTE — H&P
[facility]    Pre-Endoscopy H&PE      Patient: Carine Lang    : 1955    Acct#: [de-identified]  Primary Care Physician: No primary care provider on file. Date of evaluation: 2021    Planned Procedure:    [x]EGD    []Enteroscopy    []PEG    []PEG change    []PEG removal  []Variceal banding    []Biopsy   []Dilation      [x]Control of bleeding  []Destruction of lesion by [facility]    []Stent Placement  []Foreign Body Removal    []Snare Polypectomy  []Other:       []Colonoscopy  []Flex Sigmoid []EUS, rectal      []Biopsy   []Dilation      []Control of bleeding  []Destruction of lesion by [facility]    []Stent Placement  []Foreign Body Removal    []Snare Polypectomy  []Other:      Planned Sedation  []Conscious Sedation [x]MAC/Propofol []Anesthesia    []None    Airway:  Adequate for planned sedation    Indication:   Melena, anemia, LUQ pain    History:  The patient is a 63 y.o. female with COPD and SLE admitted 21 for weakness, fatigue, lethargy, dyspnea due to pneumonia. She is malnourished and has wt loss, melena, anemia, and LUQ pain. Physical Exam:  VITALS: /61   Pulse 83   Temp 98.1 °F (36.7 °C) (Oral)   Resp 18   Ht 5' 4\" (1.626 m)   Wt 66 lb 8 oz (30.2 kg)   SpO2 94%   BMI 11.41 kg/m²   The patient is a 63 y.o. female in no acute distress. HEAD: Normal cephalic/atraumatic. Extra-occular motions intact bilaterally. NECK: No lymphadenopathy or bruits. CHEST: Rises equally on inspiration. Clear to auscultation bilaterally. CARDIOVASCULAR: Regular rate and rhythm without murmurs, rubs or gallops. ABDOMEN: Soft, nontender, and nondistended with normal bowel sounds. No Hepatosplemomegaly. UPPER EXTREMITIES: no cyanosis, clubbing, or edema. DERM: no rash or jaundice. LOWER EXTREMITIES: no cyanosis, clubbing, or edema. NEURO: Alert and oriented times four. Patient moves all extremities and has gross sensation in all extremities.     ASA: ASA 3 - Patient with moderate systemic disease with functional limitations    See GI consult dated 8/31/21    Lora Lancaster MD, MD  1:51 PM 8/31/2021

## 2021-08-31 NOTE — CARE COORDINATION
8/31/21, 11:10 AM EDT  DISCHARGE PLANNING EVALUATION:    [address] [place]       Admitted: 8/30/2021/ [address] day: 1   Location: [facility]27/027-A Reason for admit: Melena [K92.1]  Pneumonia [J18.9]  Hypoxia [R09.02]  Pneumonia of left lower lobe due to infectious organism [J18.9]  Anemia, unspecified type [D64.9]   PMH:  has a past medical history of Colonoscopy refused, Discoid lupus, Lump in chest, Pneumonia, and Tobacco abuse disorder. Procedure: none  Barriers to Discharge:  Presented to ED with weakness and sob. Pneumonia. Hospitalist following. GI consult for abdominal pain and melena. Dietitian consult. PT/OT. IVF. IV zithromax. Protonix gtt. IV rocephin. Carafate. PCP: No primary care provider on file. Readmission Risk Score: 15%    Patient Goals/Plan/Treatment Preferences: Spoke with Merritt Coates, she plans to return home alone, reports no family in [address] [place] [address] but in surrounding towns. She does not drive but daughter transports to [facility]. She does not have a PCP, provided with list of accepting physicians. Transportation/Food Security/Housekeeping Addressed:  No issues identified.

## 2021-08-31 NOTE — CONSULTS
Consult History & Physical      Patient:  Carolyn Mehta  YOB: 1955  MRN: [id number]     Acct: [de-identified]    Chief Complaint:    Chief Complaint   Patient presents with    Weight Loss       Date of Service: Pt seen/examined in consultation on 8/31/2021    History Of Present Illness:      63 y.o. female who we are asked to see/evaluate by JOHANN Arcos for medical management of melena. She came to the ED yesterday for generalized weakness, fatigue, and lethargy. She also endorses SOB which she says is now better. She has dizziness with ambulation. She endorses melena ongoing for approximately 3 days. She says she has not taken any Pepto Bismol \"for a while. \" She denies [address] and NSAID use. She says she was told she had anemia \"a long time ago. \" She was told she has iron deficiency \"the last time she was here. \" Initial Hgb 11.2. She endorses LUQ pain that also started 3 days ago. Initially it was continuous and now is intermittent. It is worse with taking a deep breath. Pain medication helps alleviate some pain. She denies nausea, vomiting, diarrhea, constipation, and hematochezia. Denies past history of GI bleeding. She has never had abdominal surgeries, EGD, or colonoscopy. She does not know if anyone in her family has had colon cancer. She was drinking beer twice daily, 2 beers at a time, but has not drank for 1-2 weeks. She is a pack per day smoker. Denies illicit drug use. CT A/P demonstrated left lower lung consolidation/infiltrate, no acute abdominal or pelvic findings. CXR demonstrated pneumonic infiltrates are scattered throughout the left lung. She is being treated for pneumonia. Past Medical History:    Past Medical History:   Diagnosis Date    Colonoscopy refused     could not afford it.  Discoid lupus 12/17/2013    Seen Dr. Graciela Lopez. Bx discoid lupus. Under treatment. KAITLIN normal. Lesion on scalp.  Patient on Plaquenil and steroid cream     Lump in chest 2005    Lt side of the chest 5 x 5 cm cyst    Pneumonia     Tobacco abuse disorder        Home Medications:  Prior to Admission medications    Medication Sig Start Date End Date Taking? Authorizing Provider   acetaminophen (TYLENOL) 500 MG tablet Take 500 mg by mouth every 6 hours as needed for Pain or Fever   Yes Historical Provider, MD AntoineLkxrmxzzu-ISF-PW-APAP ( [facility]) 5-2- MG MISC Take by mouth as needed (cold symptoms)    Yes Historical Provider, MD   clobetasol (TEMOVATE) 0.05 % cream APPLY TO RASH AREAS TWICE DAILY 10/13/16  Yes Historical Provider, MD   fluocinonide (LIDEX) 0.05 % gel  14  Yes Historical Provider, MD   hydroxychloroquine (PLAQUENIL) 200 MG tablet Take 1 tablet by mouth 2 times daily  12/10/13  Yes Historical Provider, MD       Surgical History:  Past Surgical History:   Procedure Laterality Date     SECTION     [address]    left       Family History:  Family History   Problem Relation Age of Onset    Substance Abuse Father     Diabetes Mother        Past GI History:  None    Allergies:  Patient has no known allergies. Social History:   TOBACCO:   reports that she has been smoking cigarettes. She started smoking about 48 years ago. She has been smoking about 1.00 pack per day. She has never used smokeless tobacco.  ETOH:   reports current alcohol use of about 3.0 standard drinks of alcohol per week. Review Of Systems  GENERAL: +generalized weakness +fatigue +lethargy  EYES:  No  blurred vision, double vision   CARDIOVASCULAR: No chest pain or palpitations. RESPIRATORY:  +dyspnea   GI:  See HPI  MUSCULOSKELETAL: No new painful or swollen joints or myalgias. :   No dysuria or hematuria. SKIN:  No rashes or jaundice. NEUROLOGIC:  +dizziness with ambulation  PSYCH:  No anxiety or depression. ENDOCRINE:  No polyuria or polydipsia.     BLOOD:  +anemia     PHYSICAL EXAM:  BP (!) 101/56   Pulse 64   Temp 98.5 °F (36.9 °C) (Oral)   Resp 18   Ht 5' 4\" (1.626 m)   Wt 66 lb 8 oz (30.2 kg)   SpO2 98%   BMI 11.41 kg/m²     General appearance: Chronically ill appearing female, appears older than stated age, cachexic  HEENT: Normal cephalic, atraumatic without obvious deformity. Pupils equal, round, and reactive to light. Neck: Supple, with full range of motion. No jugular venous distention. Trachea midline. Respiratory:  Normal respiratory effort. Clear, diminished to auscultation, bilaterally without Rales/Wheezes/Rhonchi. Cardiovascular: Regular rate and rhythm without murmurs, rubs or gallops. Abdomen: Soft, concave, non-tender, non-distended with active bowel sounds. Musculoskeletal: No clubbing, cyanosis or edema bilaterally. Skin: Pink, warm, dry. No rashes or lesions. Diffuse dry, scaly, rash with erythema noted throughout (consistent with lupus history)  Psychiatric: Alert and oriented, thought content appropriate, normal insight    Labs:   Recent Labs     08/30/21  1145 08/30/21  1825 08/31/21  0926   WBC 8.5  --   --    HGB 11.2*   < > 9.1*   HCT 33.4*   < > 27.9*     --   --     < > = values in this interval not displayed. Recent Labs     08/31/21  0926   *   K 3.1*   CL 98   CO2 22*   BUN 25*   CREATININE 0.2*   CALCIUM 8.7     Recent Labs     08/30/21  1145   AST 29   ALT 25   BILIDIR <0.2   BILITOT 0.5   ALKPHOS 69     Radiology:   CT abdomen/pelvis 08/30/21      Impression   1. Left lower lung consolidation/infiltrate. 2. No acute abdominal or pelvic findings. 3. No bowel obstruction. 4. Steatosis of the liver. CXR 08/30/21      Impression   Pneumonic infiltrates are scattered throughout the left lung. Code Status: Limited    ASSESSMENT:  1. Fatigue/lethargy  2. Melena- none since admission  3. Acute anemia  4. LUQ pain- likely musculoskeletal  5. Pneumonia- on ATBs  6. Alcohol abuse- has not drank for 1-2 weeks  7. Tobacco abuse  8. Acute COPD exacerbation  9. Severe malnutrition, anorexia  10. Discoid lupus- has been off Plaquenil  11. Hyponatremia  12. Hypokalemia    PLAN:     Monitor H & H, transfuse prn   Anemia labs   Continue PPI gtt   Nursing to monitor stool output & document   INR stat   NPO   Hold Lovenox   EGD with risks & benefits discussed. Code reversal for EGD discussed. Patient is agreeable to proceed.  Will schedule for urgent EGD today at 1500 with Dr. Moshe Burton Alcohol & tobacco cessation discussed   ATBs per primary   Supportive care per primary team  Will follow       Case reviewed and impression/plan reviewed in collaboration with Dr. Diane Rodríguez  Electronically signed by ADRIEN Lam - CNP on 8/31/2021 at 11:16 AM    [facility]  Thank you for the consultation.

## 2021-08-31 NOTE — PROGRESS NOTES
Hospitalist Progress Note      Patient:  Clive Link    Unit/Bed:6K-27/027-A  YOB: 1955  MRN: [id number]   Acct: [de-identified]   PCP: No primary care provider on file. Date of Admission: 8/30/2021    Assessment/Plan:    1. Acute respiratory failure, hypoxia: Pt's baseline is RA. Pt needed 2L, weaned back to RA. Continue to monitor. IS.   2. COPD, acute exacerbation: Ceftriaxone. Azithromycin. IS. Acapella. Breathing treatments. Respiratory culture pending. 3. Melena with abd pain: GI consulted. EGD today- Daily PPI, Carafate, EGD in 2-3 months. 4. Hyponatremia: Sodium 129, 131, 131, 131. BMP in the AM.   5. Hypokalemia: Potassium 3.1 - Potassium Replacement Protocol. Magnesium in the AM.   6. Anemia, acute on chronic: Hgb 11.2, 10.1, 9.6, 9.1. CBC in the AM.   7. Severe protein calorie malnutrition: [facility] 11.41. [facility]. Chief Complaint: Generalized Weakness     Initial H and P:-    Initial H&P \"Patient is a 62-year-old female with history of COPD, discoid lupus, and tobacco use presents to the hospital generalized weakness and lethargy. She states that she is also experienced shortness of breath.     These symptoms have been ongoing for several weeks, but have worsened over the last few days. She states that she has been unable to follow-up with her dermatologist for treatment for her discoid lupus during the pandemic. She has run out of her Plaquenil as well as her topical cream.  Her rash has worsened since this time.     She also complains of shortness of breath as well as a cough with some productive sputum. Denies any fevers, but admits to subjective chills. Denies any chest pain, nausea, or vomiting.     She also complains of periumbilical and epigastric pain as well as 3-day history of black stools. Denies any use of NSAIDs or recent alcohol use. She does not take any antiplatelet or anticoagulation. \" Subjective (past 24 hours):   No acute events overnight. Patient states that she has a okay diet but has not been eating well the last few days due to abdominal pain. Patient denies much alcohol use. Patient is a poor historian. Past medical history, family history, social history and allergies reviewed again and is unchanged since admission. ROS Pt denies CP, SOB, HA, diarrhea, myalgias. Medications:  Reviewed    Infusion Medications    lactated ringers 100 mL/hr at 08/31/21 1039    sodium chloride       Scheduled Medications    [START ON 9/1/2021] pantoprazole  40 mg Oral QAM AC    sucralfate  1 g Oral 4 times per day    sodium chloride flush  5-40 mL IntraVENous 2 times per day    [Held by provider] enoxaparin  40 mg SubCUTAneous Daily    cefTRIAXone (ROCEPHIN) IV  1,000 mg IntraVENous Q24H    guaiFENesin  600 mg Oral BID    azithromycin  500 mg IntraVENous Q24H     PRN Meds: sodium chloride flush, sodium chloride, ondansetron **OR** ondansetron, polyethylene glycol, acetaminophen **OR** acetaminophen, potassium chloride **OR** potassium alternative oral replacement **OR** potassium chloride, LORazepam, ipratropium-albuterol      Intake/Output Summary (Last 24 hours) at 8/31/2021 1557  Last data filed at 8/31/2021 1407  Gross per 24 hour   Intake 487.96 ml   Output 0 ml   Net 487.96 ml       Diet:  ADULT DIET; Regular    Exam:  BP (!) 107/59   Pulse 63   Temp 97.8 °F (36.6 °C) (Temporal)   Resp 16   Ht 5' 4\" (1.626 m)   Wt 66 lb 8 oz (30.2 kg)   SpO2 98%   BMI 11.41 kg/m²   General appearance: Chronically ill appearing white female, very thin   HEENT: Pupils equal, round, and reactive to light. Conjunctivae/corneas clear. Neck: Supple, with full range of motion. No jugular venous distention. Trachea midline. Respiratory:  Normal respiratory effort on RA. Breath sounds diminished.    Cardiovascular: Regular rate and rhythm with normal S1/S2 without murmurs, rubs or gallops. Abdomen: Soft, non-tender, non-distended with normal bowel sounds. Musculoskeletal: passive and active ROM x 4 extremities. Skin: Skin color, texture, turgor normal.  No rashes or lesions. Neurologic:  Neurovascularly intact without any focal sensory/motor deficits. Cranial nerves: II-XII intact, grossly non-focal.  Psychiatric: Alert and oriented, thought content appropriate, normal insight  Capillary Refill: Brisk,< 3 seconds   Peripheral Pulses: +2 palpable, equal bilaterally     Labs:   Recent Labs     08/30/21  1145 08/30/21  1145 08/30/21  1825 08/31/21  0547 08/31/21  0926   WBC 8.5  --   --   --   --    HGB 11.2*   < > 10.1* 9.6* 9.1*   HCT 33.4*   < > 30.2* 30.2* 27.9*     --   --   --   --     < > = values in this interval not displayed. Recent Labs     08/30/21  1825 08/31/21  0547 08/31/21  0926   * 131* 131*   K 3.3* 3.4* 3.1*   CL 96* 99 98   CO2 25 22* 22*   BUN 25* 28* 25*   CREATININE 0.3* 0.3* 0.2*   CALCIUM 8.7 8.5 8.7     Recent Labs     08/30/21  1145   AST 29   ALT 25   BILIDIR <0.2   BILITOT 0.5   ALKPHOS 69     Recent Labs     08/31/21  1210   INR 1.05     Microbiology:    Blood culture #1:   Lab Results   Component Value Date    BC No growth-preliminary  08/30/2021    BC No growth-preliminary  08/30/2021       Urinalysis:      Lab Results   Component Value Date    NITRU NEGATIVE 08/30/2021    WBCUA 0-2 08/30/2021    BACTERIA NONE SEEN 08/30/2021    RBCUA 5-10 08/30/2021    BLOODU MODERATE 08/30/2021    SPECGRAV 1.010 10/01/2015    GLUCOSEU NEGATIVE 08/30/2021       Radiology:  XR CHEST (2 VW)   Final Result   Pneumonic infiltrates are scattered throughout the left lung. **This report has been created using voice recognition software. It may contain minor errors which are inherent in voice recognition technology. **      Final report electronically signed by Dr Alejandro De Dios on 8/30/2021 3:41 PM      CT ABDOMEN PELVIS W IV CONTRAST Additional Contrast? None   Final Result   1. Left lower lung consolidation/infiltrate. 2. No acute abdominal or pelvic findings. 3. No bowel obstruction. 4. Steatosis of the liver. **This report has been created using voice recognition software. It may contain minor errors which are inherent in voice recognition technology. **      Final report electronically signed by Dr. Laury Cadena on 8/30/2021 1:03 PM        Electronically signed by JOHANN Blackmon on 8/31/2021 at 3:57 PM

## 2021-08-31 NOTE — OP NOTE
[address] [address] Endoscopy    Patient: Michelle Dennison  : 1955  Acct#: [de-identified]  Date of evaluation: 2021  Primary Care Physician: No primary care provider on file. Procedure:    [x]EGD    []Enteroscopy    [x]Biopsy   []Dilation      []PEG    []PEG change    []PEG removal  []Variceal banding    []Control of bleeding  []Destruction of lesion by [facility]    []Stent Placement  []Foreign Body Removal    []Snare Polypectomy  []Other:     []Aborted:        []Colonoscopy  []Flex Sigmoid []EUS, rectal     []Biopsy   []Snare Polypectomy    []Control of bleeding  []Destruction of lesion by APC    []Stent Placement  []Foreign Body Removal    []Dilation    []Other:    []Aborted:   []Tattoo    Indication:   Melena, anemia, LUQ pain    History:  The patient is a 63 y.o. female with COPD and SLE admitted 21 for weakness, fatigue, lethargy, dyspnea due to pneumonia. She is malnourished and has wt loss, melena, anemia, and LUQ pain. Physical Exam:  VITALS: /61   Pulse 83   Temp 98.1 °F (36.7 °C) (Oral)   Resp 18   Ht 5' 4\" (1.626 m)   Wt 66 lb 8 oz (30.2 kg)   SpO2 94%   BMI 11.41 kg/m²   The patient is a 63 y.o. female in no acute distress. HEAD: Normal cephalic/atraumatic. Extra-occular motions intact bilaterally. NECK: No lymphadenopathy or bruits. CHEST: Rises equally on inspiration. Clear to auscultation bilaterally. CARDIOVASCULAR: Regular rate and rhythm without murmurs, rubs or gallops. ABDOMEN: Soft, nontender, and nondistended with normal bowel sounds. No Hepatosplemomegaly. UPPER EXTREMITIES: no cyanosis, clubbing, or edema. DERM: no rash or jaundice. LOWER EXTREMITIES: no cyanosis, clubbing, or edema. NEURO: Alert and oriented times four. Patient moves all extremities and has gross sensation in all extremities.     ASA: ASA 3 - Patient with moderate systemic disease with functional limitations    MEDICATION:

## 2021-08-31 NOTE — PROGRESS NOTES
Pt admitted to  6K27 from ED. Complaints: Shortness of breath. IV none infusing into the antecubital right, condition patent and no redness. IV site free of s/s of infection or infiltration. Vital signs obtained. Assessment and data collection initiated. Two nurse skin assessment performed by Hanna Guevara and [employer]  RN. Oriented to room. Policies and procedures for 6K explained. This RN discussed hourly rounding with patient addressing 5 P's. Fall prevention and safety brochure discussed with patient. Bed alarm on. Call light in reach. All questions answered with no further questions at this time.

## 2021-08-31 NOTE — ANESTHESIA POSTPROCEDURE EVALUATION
Department of Anesthesiology  Postprocedure Note    Patient: Alyssa Savage  MRN: [id number]  YOB: 1955  Date of evaluation: 8/31/2021  Time:  2:08 PM     Procedure Summary     Date: 08/31/21 Room / Location: [address] [place] [address] / [address] [place] [address]    Anesthesia Start: 6153 Anesthesia Stop: 4446    Procedure: EGD BIOPSY (N/A ) Diagnosis: (MELANA)    Surgeons: Julita Sanchez MD Responsible Provider: Maryjo Abrams DO    Anesthesia Type: MAC ASA Status: 3          Anesthesia Type: MAC    Adria Phase I: Adria Score: 10    Adria Phase II:      Last vitals: Reviewed and per EMR flowsheets.        Anesthesia Post Evaluation    Patient location during evaluation: bedside  Patient participation: complete - patient participated  Level of consciousness: awake and alert  Airway patency: patent  Nausea & Vomiting: no nausea and no vomiting  Complications: no  Cardiovascular status: hemodynamically stable  Respiratory status: acceptable, room air and spontaneous ventilation  Hydration status: euvolemic

## 2021-08-31 NOTE — PROGRESS NOTES
EGD completed. Tolerated well. Photos taken. Biopsies taken. One specimen jar labeled and sent to lab. Scope [id number].

## 2021-08-31 NOTE — PROGRESS NOTES
Recovery mode. Denies discomfort, taking fluids. Dr. Corby Abdullahi discussed findings and plan of care with patient.  Report given to the nurse for 4S98

## 2021-08-31 NOTE — PROGRESS NOTES
Wound ostomy consulted for \"severe scattered ulcers\". Present to pt room. Pt laying in bed. Pt A&O x 3. Asked pt if she had open areas. Pt states that she doesn't think she does. Pt states she has had diarrhea and thinks there is just a \"rash\" to area. Pt does roll to left side for assessment of area. Pt noted to have MASD and irritation to jonna rectal area probable from diarrhea. Pt is wearing a depends right now d/t the diarrhea and not having control. Applied triad to jonna rectal area. Informed pt to continue to apply cream after bowel movements/twice daily. Informed pt that area will feel better as well if she can remove depends and offload bottom. Pt did not want to remove depends at this time. Pt noted to be scratching lower back. Pt noted to have dry scaly skin throughout. Pt states she has lupus and uses hydrocortisone cream to itching areas. Informed nurse that pt uses hydrocortisone cream to areas and would recommend to resume if okay per attending provider. Pt is very thin and has had recent weight loss making patient high risk for pressure injuries and skin breakdown. Primary nurse states dietary is on the case. Pt on hercules dream air support surface with alternating pressure and microclimate control. Recommend offloading heels and coccyx with pillow support. Bed in low, call light in reach. Call wound ostomy as needed.       Jonna rectal irritation/MASD POA

## 2021-09-01 ENCOUNTER — TELEPHONE (OUTPATIENT)
Dept: FAMILY MEDICINE CLINIC | Age: 66
End: 2021-09-01

## 2021-09-01 VITALS
RESPIRATION RATE: 18 BRPM | BODY MASS INDEX: 12.75 KG/M2 | OXYGEN SATURATION: 96 % | HEART RATE: 99 BPM | TEMPERATURE: 98.1 F | WEIGHT: 74.7 LBS | HEIGHT: 64 IN | DIASTOLIC BLOOD PRESSURE: 58 MMHG | SYSTOLIC BLOOD PRESSURE: 101 MMHG

## 2021-09-01 PROBLEM — E43 SEVERE MALNUTRITION (HCC): Status: ACTIVE | Noted: 2021-09-01

## 2021-09-01 LAB
ANION GAP SERPL CALCULATED.3IONS-SCNC: 9 MEQ/L (ref 8–16)
BASOPHILS # BLD: 0.2 %
BASOPHILS ABSOLUTE: 0 THOU/MM3 (ref 0–0.1)
BUN BLDV-MCNC: 17 MG/DL (ref 7–22)
CALCIUM SERPL-MCNC: 8.3 MG/DL (ref 8.5–10.5)
CHLORIDE BLD-SCNC: 97 MEQ/L (ref 98–111)
CO2: 23 MEQ/L (ref 23–33)
CREAT SERPL-MCNC: 0.3 MG/DL (ref 0.4–1.2)
EOSINOPHIL # BLD: 1.1 %
EOSINOPHILS ABSOLUTE: 0.1 THOU/MM3 (ref 0–0.4)
ERYTHROCYTE [DISTWIDTH] IN BLOOD BY AUTOMATED COUNT: 12 % (ref 11.5–14.5)
ERYTHROCYTE [DISTWIDTH] IN BLOOD BY AUTOMATED COUNT: 44.6 FL (ref 35–45)
GFR SERPL CREATININE-BSD FRML MDRD: > 90 ML/MIN/1.73M2
GLUCOSE BLD-MCNC: 142 MG/DL (ref 70–108)
HCT VFR BLD CALC: 25.8 % (ref 37–47)
HCT VFR BLD CALC: 26.5 % (ref 37–47)
HEMOGLOBIN: 8.8 GM/DL (ref 12–16)
HEMOGLOBIN: 9 GM/DL (ref 12–16)
IMMATURE GRANS (ABS): 0.02 THOU/MM3 (ref 0–0.07)
IMMATURE GRANULOCYTES: 0.4 %
LYMPHOCYTES # BLD: 19.7 %
LYMPHOCYTES ABSOLUTE: 1.1 THOU/MM3 (ref 1–4.8)
MAGNESIUM: 1.4 MG/DL (ref 1.6–2.4)
MCH RBC QN AUTO: 35.1 PG (ref 26–33)
MCHC RBC AUTO-ENTMCNC: 34.1 GM/DL (ref 32.2–35.5)
MCV RBC AUTO: 102.8 FL (ref 81–99)
MONOCYTES # BLD: 15.4 %
MONOCYTES ABSOLUTE: 0.9 THOU/MM3 (ref 0.4–1.3)
NUCLEATED RED BLOOD CELLS: 0 /100 WBC
PLATELET # BLD: 229 THOU/MM3 (ref 130–400)
PMV BLD AUTO: 9.6 FL (ref 9.4–12.4)
POTASSIUM REFLEX MAGNESIUM: 3.8 MEQ/L (ref 3.5–5.2)
RBC # BLD: 2.51 MILL/MM3 (ref 4.2–5.4)
SEG NEUTROPHILS: 63.2 %
SEGMENTED NEUTROPHILS ABSOLUTE COUNT: 3.5 THOU/MM3 (ref 1.8–7.7)
SODIUM BLD-SCNC: 129 MEQ/L (ref 135–145)
WBC # BLD: 5.6 THOU/MM3 (ref 4.8–10.8)

## 2021-09-01 PROCEDURE — G0378 HOSPITAL OBSERVATION PER HR: HCPCS

## 2021-09-01 PROCEDURE — 83735 ASSAY OF MAGNESIUM: CPT

## 2021-09-01 PROCEDURE — 2580000003 HC RX 258: Performed by: STUDENT IN AN ORGANIZED HEALTH CARE EDUCATION/TRAINING PROGRAM

## 2021-09-01 PROCEDURE — 97535 SELF CARE MNGMENT TRAINING: CPT

## 2021-09-01 PROCEDURE — 6360000002 HC RX W HCPCS: Performed by: STUDENT IN AN ORGANIZED HEALTH CARE EDUCATION/TRAINING PROGRAM

## 2021-09-01 PROCEDURE — 85025 COMPLETE CBC W/AUTO DIFF WBC: CPT

## 2021-09-01 PROCEDURE — 85014 HEMATOCRIT: CPT

## 2021-09-01 PROCEDURE — 6360000002 HC RX W HCPCS: Performed by: PHYSICIAN ASSISTANT

## 2021-09-01 PROCEDURE — 36415 COLL VENOUS BLD VENIPUNCTURE: CPT

## 2021-09-01 PROCEDURE — 6370000000 HC RX 637 (ALT 250 FOR IP): Performed by: STUDENT IN AN ORGANIZED HEALTH CARE EDUCATION/TRAINING PROGRAM

## 2021-09-01 PROCEDURE — 85018 HEMOGLOBIN: CPT

## 2021-09-01 PROCEDURE — 80048 BASIC METABOLIC PNL TOTAL CA: CPT

## 2021-09-01 PROCEDURE — 99239 HOSP IP/OBS DSCHRG MGMT >30: CPT | Performed by: PHYSICIAN ASSISTANT

## 2021-09-01 PROCEDURE — 97166 OT EVAL MOD COMPLEX 45 MIN: CPT

## 2021-09-01 PROCEDURE — 6370000000 HC RX 637 (ALT 250 FOR IP): Performed by: INTERNAL MEDICINE

## 2021-09-01 PROCEDURE — 96367 TX/PROPH/DG ADDL SEQ IV INF: CPT

## 2021-09-01 PROCEDURE — 97530 THERAPEUTIC ACTIVITIES: CPT

## 2021-09-01 RX ORDER — MAGNESIUM SULFATE IN WATER 40 MG/ML
2000 INJECTION, SOLUTION INTRAVENOUS PRN
Status: DISCONTINUED | OUTPATIENT
Start: 2021-09-01 | End: 2021-09-01 | Stop reason: HOSPADM

## 2021-09-01 RX ADMIN — ACETAMINOPHEN 650 MG: 325 TABLET ORAL at 10:21

## 2021-09-01 RX ADMIN — SUCRALFATE 1 G: 1 TABLET ORAL at 00:45

## 2021-09-01 RX ADMIN — MAGNESIUM SULFATE HEPTAHYDRATE 2000 MG: 40 INJECTION, SOLUTION INTRAVENOUS at 12:56

## 2021-09-01 RX ADMIN — CLOBETASOL PROPIONATE: 0.5 CREAM TOPICAL at 08:15

## 2021-09-01 RX ADMIN — SUCRALFATE 1 G: 1 TABLET ORAL at 12:56

## 2021-09-01 RX ADMIN — CEFTRIAXONE SODIUM 1000 MG: 1 INJECTION, POWDER, FOR SOLUTION INTRAMUSCULAR; INTRAVENOUS at 13:57

## 2021-09-01 RX ADMIN — PANTOPRAZOLE SODIUM 40 MG: 40 TABLET, DELAYED RELEASE ORAL at 05:54

## 2021-09-01 RX ADMIN — SUCRALFATE 1 G: 1 TABLET ORAL at 05:54

## 2021-09-01 RX ADMIN — GUAIFENESIN 600 MG: 600 TABLET, EXTENDED RELEASE ORAL at 08:16

## 2021-09-01 ASSESSMENT — PAIN SCALES - GENERAL: PAINLEVEL_OUTOF10: 4

## 2021-09-01 NOTE — PROGRESS NOTES
[address]  INPATIENT OCCUPATIONAL THERAPY  [facility] RENAL TELEMETRY 6K  EVALUATION    Time:   Time In: 8990  Time Out: 1444  Timed Code Treatment Minutes: 23 Minutes  Minutes: 31          Date: 2021  Patient Name: Carolyn Mehta,   Gender: female      MRN: [id number]  : 1955  (63 y.o.)  Referring Practitioner: Arianne Moralez DO  Diagnosis: Melena  Additional Pertinent Hx: 31-year-old female with history of COPD, discoid lupus, and tobacco use presents to the hospital generalized weakness and lethargy. She states that she is also experienced shortness of breath. She also complains of shortness of breath as well as a cough with some productive sputum. She also complains of periumbilical and epigastric pain as well as 3-day history of black stools    Restrictions/Precautions:  Restrictions/Precautions: General Precautions, Fall Risk    Subjective  Chart Reviewed: Yes, Orders, Progress Notes, History and Physical, Labs, Operative Notes  Patient assessed for rehabilitation services?: Yes  Family / Caregiver Present: No    Subjective: RN approved session. Pt was lying in bed upon arrival. Pt was educated on OT's role after stating \"I do not need occupational therapy because I am going to a physical therapy rehab center after discharge. \" Pt was told that she will most likely get OT with PT there and her physican still ordered OT for her hospital stay. Pt was agreeable to complete tolieting and t/f to chair but very reluctant to provide much information about home.     Pain:  Pain Assessment  Patient Currently in Pain: No    Vitals: Vitals not assessed per clinical judgement, see nursing flowsheet    Social/Functional History:  Lives With: Alone (& Dog)  Type of Home: Apartment  Home Layout: Two level (With bedroom upstairs)  Home Access: Stairs to enter without rails  Entrance Stairs - Number of Steps: Pt reports having a few steps to enter her apt   Bathroom Toilet: Standard       ADL Assistance: Independent  Homemaking Assistance: Independent  Homemaking Responsibilities: Yes  Ambulation Assistance: Independent  Transfer Assistance: Independent          Additional Comments: Pt reports being able to cook, clean, bath and dress herself. Pt's daughter lives 45 minutes away. Additional social information was not gathered d/t pt reluctant to answer many of therapists questions. VISION:WNL    HEARING:  WNL    COGNITION: Decreased Insight and Decreased Safety Awareness    RANGE OF MOTION:  Bilateral Upper Extremity:  WFL    STRENGTH:  Bilateral Upper Extremity:  Impaired - Grossly weak and deconditioned. ADL:   Upper Extremity Dressing: Minimal Assistance. Needed assistance to thread arms through hospital gown. Lower Extremity Dressing: Maximum Assistance. [place] and donning depends. Toileting: Dependent. Incontient of urine in depends. Pt required assistance for clothing management and hygiene. Toilet Transfer: Minimal Assistance. Cues given for hand placement. BALANCE:  Sitting Balance:  Stand By Assistance. Sitting EOB  Standing Balance: Contact Guard Assistance. With BUE support on RW    BED MOBILITY:  Supine to Sit: Moderate Assistance To bring trunk in upward position   Scooting: Minimal Assistance Using chucks pad. TRANSFERS:  Sit to Stand:  Minimal Assistance. Stand to Sit: Contact Guard Assistance. FUNCTIONAL MOBILITY:  Assistive Device: Rolling Walker  Assist Level:  Contact Guard Assistance. Distance: To and from bathroom  Pt was unsteady at times and fatigued quickly. Requiring RB while on toilet. Activity Tolerance:  Patient tolerance of  treatment: fair. Assessment:  Assessment: Pt is a 63yo female who presents to hospital with melena. Upon OT evaluation, pt's endurance and strength are extremely decreased limiting her ADL participation and performance. Pt was also unsteady on feet and reporting feeling like her legs could not hold her up.  Pt place.

## 2021-09-01 NOTE — PROGRESS NOTES
Comprehensive Nutrition Assessment    Type and Reason for Visit:  Initial, Consult (malnutrition)    Nutrition Recommendations/Plan:   ONS initiated: Magic Cup TID  Recommend MVI  Continue current diet    Nutrition Assessment:    Pt. severely malnourished AEB criteria listed below. At risk for further nutritional compromise r/t admit with melena with abdominal pain, acute exacerbation COPD and underlying medical condition (hx COPD, discoid lupus, tobacco use). Nutrition recommendations/interventions as per above. Malnutrition Assessment:  Malnutrition Status:  Severe malnutrition    Context:  Acute Illness     Findings of the 6 clinical characteristics of malnutrition:  Energy Intake:  7 - 50% or less of estimated energy requirements for 5 or more days  Weight Loss:  Unable to assess (reports 38% weight loss over 3-6 months, however no weight records per EMR)     Body Fat Loss:   (severe malnutrition) Orbital   Muscle Mass Loss:   (severe) Temples (temporalis), Clavicles (pectoralis & deltoids)  Fluid Accumulation:  No significant fluid accumulation     Strength:  Not Performed    Estimated Daily Nutrient Needs:  Energy (kcal):  1360+ kcals (40); Weight Used for Energy Requirements:   (34kgm on 9/1)     Protein (g):  68+ grams (2); Weight Used for Protein Requirements:   (34kgm on 9/1)          Nutrition Related Findings: Thin patient seen, reports from home alone, does own meal prep which is mostly frozen entrees, reports poor appetite/ intake for one week PTA due to abdominal pain, shortness of breath, note EGD on 8/31, dislikes traditional ONS, agreeable to trial Magic Cup supplement, denies difficulty chewing/ swallowing, tolerating po, BM x 2 on 8/30; medication includes antibiotic, carafate, lactated ringers; glucose 142    Wounds:   (coccyx: unstageable wound)       Current Nutrition Therapies:    ADULT DIET;  Regular  Adult Oral Nutrition Supplement; Frozen Oral Supplement    Anthropometric Measures:  · Height: 5' 4\" (162.6 cm)  · Current Body Weight: 74 lb 11.2 oz (33.9 kg) (9/1; no edema)   · Admission Body Weight: 66 lb 8 oz (30.2 kg) (8/30; no edema)    · Usual Body Weight:  (120# per patient with weight loss in the past ~3-6 months per patient; no weight records per EMR)     · Ideal Body Weight: 120 lbs;   · BMI: 12.8  · BMI Categories: Underweight (BMI less than 18.5)       Nutrition Diagnosis:   · Severe malnutrition related to catabolic illness, inadequate protein-energy intake (COPD) as evidenced by poor intake prior to admission, severe loss of subcutaneous fat, severe muscle loss      Nutrition Interventions:   Food and/or Nutrient Delivery:  Continue Current Diet, Start Oral Nutrition Supplement  Nutrition Education/Counseling:  Education initiated (encouraged intake at best efforts, use of ONS)   Coordination of Nutrition Care:  Continue to monitor while inpatient    Goals:  Patient will consume 75% or more of meals during LOS. Nutrition Monitoring and Evaluation:   Behavioral-Environmental Outcomes:  None Identified   Food/Nutrient Intake Outcomes:  Food and Nutrient Intake, Supplement Intake  Physical Signs/Symptoms Outcomes:  Biochemical Data, Fluid Status or Edema, Nutrition Focused Physical Findings, Skin, Weight     Discharge Planning:     Too soon to determine     Electronically signed by Perla Salazar RD, LD on 9/1/21 at 3:09 PM EDT    Contact: [phone]

## 2021-09-01 NOTE — CARE COORDINATION
9/1/21, 4:03 PM EDT    Patient goals/plan/ treatment preferences discussed by  and . Patient goals/plan/ treatment preferences reviewed with patient/ family. Patient/ family verbalize understanding of discharge plan and are in agreement with goal/plan/treatment preferences. Understanding was demonstrated using the teach back method. AVS provided by RN at time of discharge, which includes all necessary medical information pertaining to the patients current course of illness, treatment, post-discharge goals of care, and treatment preferences. Services After Discharge  Services At/After Discharge: Nursing Services, Skilled Therapy [facility], )         Order received for nursing home placement at [facility]. FER spoke with pt regarding discharge plan and daughter waning [facility]. Pt rolled her eyes and stated she wanted to go home. FER noted pt does not have insurance. FER spoke with pts daughter, December, via phone. Explained that pt does not have insurance, December requested SW contact [facility] as they are familiar with situation. FER spoke with [facility] with [facility]. FER explained that pt is ready for discharge today and does not have insurance. Peter Santana with [employer] is going to meet with pt to initiate medicaid. FER informed [facility] that pt is at this time refusing ECF and is agreeable to going home with [facility]. [facility] stated she would update December and possibly assist with placement from the community. FER completed Financial Aid form for [facility] with pt. Completed referral with Kobe Rico with [facility] and faxed form. Pts daughter is to pick pt up later today. FER updated RN.

## 2021-09-01 NOTE — TELEPHONE ENCOUNTER
Mirza Jimenez,  [facility], called the office to schedule a hospital follow up appointment for [facility]. Appointment scheduled 9/7/21 @11A. M. with Dr. Natalie Duggan.

## 2021-09-01 NOTE — CARE COORDINATION
9/1/21, 10:09 AM EDT    DISCHARGE ON GOING EVALUATION    Yaritza Angel day: 2  Location: -27/027-A Reason for admit: Melena [K92.1]  Pneumonia [J18.9]  Hypoxia [R09.02]  Pneumonia of left lower lobe due to infectious organism [J18.9]  Anemia, unspecified type [D64.9]   Procedure:   8/31 EGD IMPRESSION:  1. Esophagus - LA grade B esophagitis with small hiatal hernia  2. Stomach - diffuse gastritis with 3 small antral ulcers, biopsied for Hpylori using Fahad criteria  3. Duodenum - 1.5 cm clean-based ulcer in bulb     Barriers to Discharge: Hgb 8.8, Acapella, Dietitian consult, GI, Wound Ostomy following, PT/OT, IV Zithromax, IV Rocephin, Duonebs, Protonix, electrolyte replacement protocols. PCP: No primary care provider on file. Readmission Risk Score: 16%  Patient Goals/Plan/Treatment Preferences: Met with Rayo Longoria and her daughter. Daughter and patient in agreement for [facility] for rehab. SW consult placed. Will follow. [employer] contacted regarding payor source. Message left for Zebedee Jeans.    9/1/21, 3:13 PM EDT    Patient goals/plan/ treatment preferences discussed by  and . Patient goals/plan/ treatment preferences reviewed with patient/ family. Patient/ family verbalize understanding of discharge plan and are in agreement with goal/plan/treatment preferences. Understanding was demonstrated using the teach back method. AVS provided by RN at time of discharge, which includes all necessary medical information pertaining to the patients current course of illness, treatment, post-discharge goals of care, and treatment preferences. Pt to be discharged to home alone with [facility]. She has a walker and a wheelchair. She is set up to see  at the [address] [place] [address] as a hospital follow up and to become established with a PCP.

## 2021-09-02 ASSESSMENT — ENCOUNTER SYMPTOMS
CHEST TIGHTNESS: 0
WHEEZING: 0
NAUSEA: 1
BACK PAIN: 0
COUGH: 1
VOMITING: 0
EYE REDNESS: 0
CONSTIPATION: 0
SORE THROAT: 0
RHINORRHEA: 0
ABDOMINAL PAIN: 0
SHORTNESS OF BREATH: 1
DIARRHEA: 0

## 2021-09-02 NOTE — ED PROVIDER NOTES
[place] ENCOUNTER          Pt Name: Lynn Aquino  MRN: [id number]  [place] 1955  Date of evaluation: 8/30/2021  Emergency Physician: Vadim Richmond DO    CHIEF COMPLAINT       Chief Complaint   Patient presents with    Weight Loss     History obtained from the patient. HISTORY OF PRESENT ILLNESS    HPI  Lynn Aquino is a 63 y.o. female who presents to the emergency department for evaluation of failure to thrive. Patient has been fatigued and weak aggressively worsening over the last 3 weeks. Reports decreased appetite and epigastric abdominal pain. Abdominal pain is worse with food. Reports intermittent dark tarry stools. She reports decreased exertional capacity, cough, white-green sputum and decreased p.o. intake. Patient with a history of COPD although she is not on supplemental oxygen. No fever no chills. Reports a weight loss and home care issues. The patient has no other acute complaints at this time. REVIEW OF SYSTEMS   Review of Systems   Constitutional: Negative for chills and fever. HENT: Negative for congestion, rhinorrhea and sore throat. Eyes: Negative for redness and visual disturbance. Respiratory: Positive for cough and shortness of breath. Negative for chest tightness and wheezing. Cardiovascular: Negative for chest pain and leg swelling. Gastrointestinal: Positive for nausea. Negative for abdominal pain, constipation, diarrhea and vomiting. Endocrine: Negative for polydipsia and polyuria. Genitourinary: Negative for decreased urine volume, dysuria and urgency. Musculoskeletal: Negative for back pain and myalgias. Skin: Negative for rash and wound. Neurological: Negative for light-headedness and headaches. Hematological: Does not bruise/bleed easily. Psychiatric/Behavioral: Negative for decreased concentration and dysphoric mood.          PAST MEDICAL AND SURGICAL HISTORY Past Medical History:   Diagnosis Date    Colonoscopy refused     could not afford it.  Discoid lupus 2013    Seen Dr. Diana Hale. Bx discoid lupus. Under treatment. KAITLIN normal. Lesion on scalp. Patient on Plaquenil and steroid cream     Lump in chest     Lt side of the chest 5 x 5 cm cyst    Pneumonia     Tobacco abuse disorder      Past Surgical History:   Procedure Laterality Date     SECTION      OVARY REMOVAL      left    UPPER GASTROINTESTINAL ENDOSCOPY N/A 2021    EGD BIOPSY performed by Viri Matias MD at [address] [place] [address]   No current facility-administered medications for this encounter. Current Outpatient Medications:     pantoprazole (PROTONIX) 40 MG tablet, Take 1 tablet by mouth every morning (before breakfast), Disp: 90 tablet, Rfl: 0    sucralfate (CARAFATE) 1 GM tablet, Take 1 tablet by mouth 2 times daily (before meals) Before lunch and dinner, Disp: 60 tablet, Rfl: 0    acetaminophen (TYLENOL) 500 MG tablet, Take 500 mg by mouth every 6 hours as needed for Pain or Fever, Disp: , Rfl:     [id number]-[id number]-APAP (TYLENOL COLD HEAD CONGESTION) 5-2- MG MISC, Take by mouth as needed (cold symptoms) , Disp: , Rfl:     clobetasol (TEMOVATE) 0.05 % cream, APPLY TO RASH AREAS TWICE DAILY, Disp: , Rfl: 2    fluocinonide (LIDEX) 0.05 % gel, , Disp: , Rfl:     hydroxychloroquine (PLAQUENIL) 200 MG tablet, Take 1 tablet by mouth 2 times daily , Disp: , Rfl:       SOCIAL HISTORY     Social History     Social History Narrative    Not on file     Social History     Tobacco Use    Smoking status: Current Every Day Smoker     Packs/day: 1.00     Types: Cigarettes     Start date:     Smokeless tobacco: Never Used   Substance Use Topics    Alcohol use:  Yes     Alcohol/week: 3.0 standard drinks     Types: 3 Cans of beer per week     Comment: pt states she drinks around 8/9pm and drinks 2-3 beers    Drug use: No         ALLERGIES   No Known Allergies      FAMILY HISTORY     Family History   Problem Relation Age of Onset    Substance Abuse Father     Diabetes Mother          PHYSICAL EXAM     ED Triage Vitals   BP Temp Temp Source Pulse Resp SpO2 Height Weight   08/30/21 1051 08/30/21 1051 08/30/21 1051 08/30/21 1051 08/30/21 1051 08/30/21 1051 08/30/21 2200 08/30/21 1930   104/77 97.8 °F (36.6 °C) Oral 101 16 (!) 88 % 5' 4\" (1.626 m) 66 lb 8 oz (30.2 kg)         Additional Vital Signs:  Vitals:    09/01/21 1222   BP: (!) 101/58   Pulse: 99   Resp: 18   Temp: 98.1 °F (36.7 °C)   SpO2: 96%       Physical Exam  Constitutional:       General: She is not in acute distress. Appearance: She is well-developed. She is ill-appearing (chronically ill). She is not diaphoretic. HENT:      Head: Normocephalic and atraumatic. Eyes:      General:         Right eye: No discharge. Left eye: No discharge. Conjunctiva/sclera: Conjunctivae normal.      Pupils: Pupils are equal, round, and reactive to light. Neck:      Thyroid: No thyromegaly. Vascular: No JVD. Cardiovascular:      Rate and Rhythm: Normal rate and regular rhythm. Heart sounds: Normal heart sounds. Pulmonary:      Effort: Pulmonary effort is normal. No respiratory distress. Breath sounds: Normal breath sounds. Abdominal:      General: Bowel sounds are normal. There is no distension. Palpations: Abdomen is soft. Tenderness: There is no abdominal tenderness. Musculoskeletal:         General: No tenderness. Normal range of motion. Cervical back: Normal range of motion and neck supple. Right lower leg: No edema. Left lower leg: No edema. Lymphadenopathy:      Cervical: No cervical adenopathy. Skin:     General: Skin is warm and dry. Capillary Refill: Capillary refill takes less than 2 seconds. Findings: No rash. Neurological:      Mental Status: She is alert and oriented to person, place, and time.  She is not disoriented. GCS: GCS eye subscore is 4. GCS verbal subscore is 5. GCS motor subscore is 6. Motor: No abnormal muscle tone or seizure activity. Gait: Gait normal.      Comments: Awake and alert. Moves all extremities. Non-focal exam with steady gait. Initial vital signs and nursing assessment reviewed and normal.   Pulsoximetry is normal per my interpretation. MEDICAL DECISION MAKING   Initial Assessment: Given the patient's above chief complaint and findings on history and physical examination, I thought it was appropriate to consider the following emergency medical conditions: COPD, pneumonia, bronchitis, electrolyte derangement, SUSSY, sepsis, chronic debility although some of these diagnoses are unlikely they were considered in my medical decision making.     Plan: CBC, BMP, ECG, CT scan abdomen pelvis, COVID-19 swab, troponin symptomatic treatment with Protonix, Tylenol supplemental oxygen and reassess         ED RESULTS   Laboratory results:  Labs Reviewed   CBC WITH AUTO DIFFERENTIAL - Abnormal; Notable for the following components:       Result Value    RBC 3.19 (*)     Hemoglobin 11.2 (*)     Hematocrit 33.4 (*)     .7 (*)     MCH 35.1 (*)     RDW-SD 46.5 (*)     All other components within normal limits   BASIC METABOLIC PANEL W/ REFLEX TO MG FOR LOW K - Abnormal; Notable for the following components:    Sodium 129 (*)     Potassium reflex Magnesium 3.4 (*)     Chloride 92 (*)     BUN 30 (*)     CREATININE 0.3 (*)     All other components within normal limits   HEPATIC FUNCTION PANEL - Abnormal; Notable for the following components:    Albumin 3.1 (*)     All other components within normal limits   OSMOLALITY - Abnormal; Notable for the following components:    Osmolality Calc 265.3 (*)     All other components within normal limits   BASIC METABOLIC PANEL W/ REFLEX TO MG FOR LOW K - Abnormal; Notable for the following components:    Sodium 131 (*)     Potassium reflex Magnesium 3.3 (*)     Chloride 96 (*)     BUN 25 (*)     CREATININE 0.3 (*)     All other components within normal limits   HEMOGLOBIN AND HEMATOCRIT, BLOOD - Abnormal; Notable for the following components:    Hemoglobin 10.1 (*)     Hematocrit 30.2 (*)     All other components within normal limits   HEMOGLOBIN AND HEMATOCRIT, BLOOD - Abnormal; Notable for the following components:    Hemoglobin 9.6 (*)     Hematocrit 30.2 (*)     All other components within normal limits   BASIC METABOLIC PANEL W/ REFLEX TO MG FOR LOW K - Abnormal; Notable for the following components:    Sodium 131 (*)     Potassium reflex Magnesium 3.4 (*)     CO2 22 (*)     BUN 28 (*)     CREATININE 0.3 (*)     All other components within normal limits   BASIC METABOLIC PANEL W/ REFLEX TO MG FOR LOW K - Abnormal; Notable for the following components:    Sodium 131 (*)     Potassium reflex Magnesium 3.1 (*)     CO2 22 (*)     BUN 25 (*)     CREATININE 0.2 (*)     All other components within normal limits   HEMOGLOBIN AND HEMATOCRIT, BLOOD - Abnormal; Notable for the following components:    Hemoglobin 9.1 (*)     Hematocrit 27.9 (*)     All other components within normal limits   URINE WITH REFLEXED MICRO - Abnormal; Notable for the following components:    Ketones, Urine TRACE (*)     Specific Gravity, Urine > 1.030 (*)     Blood, Urine MODERATE (*)     Protein, UA 30 (*)     Color, UA ORANGE (*)     All other components within normal limits   FERRITIN - Abnormal; Notable for the following components:    Ferritin 1,336 (*)     All other components within normal limits   IRON BINDING CAPACITY - Abnormal; Notable for the following components:    TIBC 135 (*)     All other components within normal limits   IRON SATURATION - Abnormal; Notable for the following components:    Iron Saturation 57 (*)     All other components within normal limits   RETICULOCYTES - Abnormal; Notable for the following components:    Absolute Retic # 15.0 (*)     Retic Hemoglobin 37.0 (*)     All other components within normal limits   CBC WITH AUTO DIFFERENTIAL - Abnormal; Notable for the following components:    RBC 2.51 (*)     Hemoglobin 8.8 (*)     Hematocrit 25.8 (*)     .8 (*)     MCH 35.1 (*)     All other components within normal limits   MAGNESIUM - Abnormal; Notable for the following components:    Magnesium 1.4 (*)     All other components within normal limits   BASIC METABOLIC PANEL W/ REFLEX TO MG FOR LOW K - Abnormal; Notable for the following components:    Sodium 129 (*)     Chloride 97 (*)     Glucose 142 (*)     CREATININE 0.3 (*)     Calcium 8.3 (*)     All other components within normal limits   HEMOGLOBIN AND HEMATOCRIT, BLOOD - Abnormal; Notable for the following components:    Hemoglobin 9.0 (*)     Hematocrit 26.5 (*)     All other components within normal limits   COVID-19, RAPID   CULTURE, BLOOD 2    Narrative:     Source: blood-Adult-suboptimal <5.5oz./set volume       Site: Peripheral Vein                            Current Antibiotics: not stated   CULTURE, BLOOD 1    Narrative:     Source: blood-Adult-suboptimal <5.5oz./set volume       Site: Peripheral Vein                            Current Antibiotics: not stated   RESPIRATORY PANEL, MOLECULAR, WITH COVID-19   CULTURE, RESPIRATORY   LIPASE   TROPONIN   GLOMERULAR FILTRATION RATE, ESTIMATED   ANION GAP   MAGNESIUM   LACTATE, SEPSIS   SODIUM, URINE, RANDOM   OSMOLALITY, URINE   ANION GAP   GLOMERULAR FILTRATION RATE, ESTIMATED   MAGNESIUM   ANION GAP   GLOMERULAR FILTRATION RATE, ESTIMATED   MAGNESIUM   ANION GAP   GLOMERULAR FILTRATION RATE, ESTIMATED   MAGNESIUM   IRON   VITAMIN B12 & FOLATE   PROTIME-INR   ANION GAP   GLOMERULAR FILTRATION RATE, ESTIMATED   SURGICAL PATHOLOGY       Radiologic studies results:  XR CHEST (2 VW)   Final Result   Pneumonic infiltrates are scattered throughout the left lung. **This report has been created using voice recognition software.  It may contain minor errors which are inherent in voice recognition technology. **      Final report electronically signed by Dr Soraya Roy on 8/30/2021 3:41 PM      CT ABDOMEN PELVIS W IV CONTRAST Additional Contrast? None   Final Result   1. Left lower lung consolidation/infiltrate. 2. No acute abdominal or pelvic findings. 3. No bowel obstruction. 4. Steatosis of the liver. **This report has been created using voice recognition software. It may contain minor errors which are inherent in voice recognition technology. **      Final report electronically signed by Dr. Cristhian Ochoa on 8/30/2021 1:03 PM          ED Medications administered this visit:   Medications   0.9 % sodium chloride bolus (0 mLs IntraVENous Stopped 8/30/21 1240)   iopamidol (ISOVUE-370) 76 % injection 80 mL (80 mLs IntraVENous Given 8/30/21 1240)   cefTRIAXone (ROCEPHIN) 1000 mg IVPB in 50 mL D5W minibag (0 mg IntraVENous Stopped 8/30/21 1436)   pantoprazole (PROTONIX) injection 40 mg (40 mg IntraVENous Given 8/30/21 2334)         ED COURSE    Patient with unable to care for self at home. She has advanced COPD. Uncertain if hypoxia is new or chronic. She has decreased exertional capacity. Her primary concern was her decreased appetite and abdominal pain. Therefore we obtained a CT scan of her abdomen which demonstrated a questionable left lower lobe infiltrate. Patient was then given a dose of Rocephin for COPD. Given Protonix and IV hydration for her primary complaint. Patient was then admitted to the hospitalist service for ongoing care. .    The diagnosis, extensive differential diagnosis, laboratory and imaging findings were discussed at the bedside. The patient was an active participant in their care. They are agreeable to the plan of care. All questions and concerns were addressed at the time of the encounter.   MEDICATION CHANGES     DISCHARGE MEDICATIONS:  Discharge Medication List as of 9/1/2021  3:32 PM      START taking these medications    Details   pantoprazole (PROTONIX) 40 MG tablet Take 1 tablet by mouth every morning (before breakfast), Disp-90 tablet, R-0Normal      sucralfate (CARAFATE) 1 GM tablet Take 1 tablet by mouth 2 times daily (before meals) Before lunch and dinner, Disp-60 tablet, R-0Normal                  FINAL DISPOSITION     Final diagnoses:   Hypoxia   Pneumonia of left lower lobe due to infectious organism   Anemia, unspecified type   Melena     Condition: condition: fair  Dispo: Admit to telemetry    PATIENT REFERRED TO:  CM [facility]  [address] [place] [id number]  [phone]          Critical Care Time   None      This transcription was electronically signed. Parts of this transcriptions may have been dictated by use of voice recognition software and electronically transcribed, and parts may have been transcribed with the assistance of an ED scribe. The transcription may contain errors not detected in proofreading.     Electronically Signed: Frank Kent DO, 09/02/21, 11:44 AM     Frank Kent DO  09/02/21 [id number]

## 2021-09-05 LAB
BLOOD CULTURE, ROUTINE: NORMAL
BLOOD CULTURE, ROUTINE: NORMAL

## 2021-09-07 NOTE — TELEPHONE ENCOUNTER
Stephani Espinoza with [facility] called the office to check if the patient's appointment was rescheduled from today to 9/10/21.

## 2021-09-10 ENCOUNTER — TELEPHONE (OUTPATIENT)
Dept: FAMILY MEDICINE CLINIC | Age: 66
End: 2021-09-10

## 2021-09-10 ENCOUNTER — OFFICE VISIT (OUTPATIENT)
Dept: FAMILY MEDICINE CLINIC | Age: 66
End: 2021-09-10
Payer: COMMERCIAL

## 2021-09-10 VITALS
DIASTOLIC BLOOD PRESSURE: 80 MMHG | OXYGEN SATURATION: 88 % | SYSTOLIC BLOOD PRESSURE: 140 MMHG | HEIGHT: 64 IN | TEMPERATURE: 96.4 F | WEIGHT: 79.6 LBS | RESPIRATION RATE: 16 BRPM | BODY MASS INDEX: 13.59 KG/M2

## 2021-09-10 DIAGNOSIS — L93.0 DISCOID LUPUS: ICD-10-CM

## 2021-09-10 DIAGNOSIS — R63.4 WEIGHT LOSS: ICD-10-CM

## 2021-09-10 DIAGNOSIS — J44.1 COPD WITH EXACERBATION (HCC): Primary | ICD-10-CM

## 2021-09-10 DIAGNOSIS — D64.9 ANEMIA, UNSPECIFIED TYPE: ICD-10-CM

## 2021-09-10 DIAGNOSIS — K25.4 GASTRIC ULCER WITH HEMORRHAGE, UNSPECIFIED CHRONICITY: Chronic | ICD-10-CM

## 2021-09-10 DIAGNOSIS — Z13.220 SCREENING, LIPID: ICD-10-CM

## 2021-09-10 DIAGNOSIS — Z11.59 ENCOUNTER FOR HEPATITIS C SCREENING TEST FOR LOW RISK PATIENT: ICD-10-CM

## 2021-09-10 DIAGNOSIS — Z12.31 ENCOUNTER FOR SCREENING MAMMOGRAM FOR MALIGNANT NEOPLASM OF BREAST: ICD-10-CM

## 2021-09-10 DIAGNOSIS — Z87.891 PERSONAL HISTORY OF TOBACCO USE: ICD-10-CM

## 2021-09-10 DIAGNOSIS — Z11.4 SCREENING FOR HIV (HUMAN IMMUNODEFICIENCY VIRUS): ICD-10-CM

## 2021-09-10 PROCEDURE — 99204 OFFICE O/P NEW MOD 45 MIN: CPT | Performed by: STUDENT IN AN ORGANIZED HEALTH CARE EDUCATION/TRAINING PROGRAM

## 2021-09-10 PROCEDURE — G0296 VISIT TO DETERM LDCT ELIG: HCPCS | Performed by: STUDENT IN AN ORGANIZED HEALTH CARE EDUCATION/TRAINING PROGRAM

## 2021-09-10 RX ORDER — ALBUTEROL SULFATE 90 UG/1
2 AEROSOL, METERED RESPIRATORY (INHALATION) 4 TIMES DAILY PRN
Qty: 18 G | Refills: 0 | Status: CANCELLED | OUTPATIENT
Start: 2021-09-10

## 2021-09-10 RX ORDER — SUCRALFATE 1 G/1
1 TABLET ORAL 4 TIMES DAILY
COMMUNITY

## 2021-09-10 SDOH — ECONOMIC STABILITY: FOOD INSECURITY: WITHIN THE PAST 12 MONTHS, YOU WORRIED THAT YOUR FOOD WOULD RUN OUT BEFORE YOU GOT MONEY TO BUY MORE.: NEVER TRUE

## 2021-09-10 SDOH — ECONOMIC STABILITY: FOOD INSECURITY: WITHIN THE PAST 12 MONTHS, THE FOOD YOU BOUGHT JUST DIDN'T LAST AND YOU DIDN'T HAVE MONEY TO GET MORE.: NEVER TRUE

## 2021-09-10 ASSESSMENT — ENCOUNTER SYMPTOMS
BLOOD IN STOOL: 0
TROUBLE SWALLOWING: 0
CONSTIPATION: 0
COUGH: 0
ABDOMINAL PAIN: 0
DIARRHEA: 0
VOMITING: 0
EYE PAIN: 0
SHORTNESS OF BREATH: 0
NAUSEA: 0

## 2021-09-10 ASSESSMENT — PATIENT HEALTH QUESTIONNAIRE - PHQ9
SUM OF ALL RESPONSES TO PHQ9 QUESTIONS 1 & 2: 0
SUM OF ALL RESPONSES TO PHQ QUESTIONS 1-9: 0
1. LITTLE INTEREST OR PLEASURE IN DOING THINGS: 0
SUM OF ALL RESPONSES TO PHQ QUESTIONS 1-9: 0
2. FEELING DOWN, DEPRESSED OR HOPELESS: 0
SUM OF ALL RESPONSES TO PHQ QUESTIONS 1-9: 0

## 2021-09-10 ASSESSMENT — SOCIAL DETERMINANTS OF HEALTH (SDOH): HOW HARD IS IT FOR YOU TO PAY FOR THE VERY BASICS LIKE FOOD, HOUSING, MEDICAL CARE, AND HEATING?: NOT HARD AT ALL

## 2021-09-10 NOTE — PROGRESS NOTES
[address] [facility]. [address] [place] [address]  Dept: [phone]  Dept Fax: [phone]  Loc: [phone]      Staci Lomas is a 63 y.o. female who presents today for:  Chief Complaint   Patient presents with   Hanane Xavier     [facility] Follow-up d/c 09/01/2021 Pneumonia    Referral - General     would like referral for dermatologist for Discoid Lupus was previously seeing Dr. Roque Melissa    None         HPI:     HPI  Staci Lomas is a 63 y.o. female who presents today to [facility] care. Patient has a PMHx of discoid lupus, recent pneumonia requiring hospitalization discharged on 9/1/2021. Patient recently admitted to [address] [place] [address] for acute respiratory failure secondary to pna vs possible COPD exacerbation. Patient did require 2 L of oxygen while in the hospital however was room to room air prior to discharge. She was given Rocephin, azithromycin, breathing treatments for possible COPD exacerbation. Patient was also noted to have melena with abdominal pain therefore GI was consulted. EGD was performed on 8/31/2021 which showed grade B esophagitis with a small hiatal hernia, diffuse gastritis with 3 small antral ulcers that were biopsied, as well as a 1.5 cm ulcer in the bulb of the duodenum. It was recommended that patient follow-[facility] in 2 to 3 months for repeat EGD to confirm healing of the ulcers. Patient was discharged home on a daily PPI as well as Carafate. Patient currently waiting 79 pounds, she does not know how long she has been this way for. Of note from 2018 hospital stay patient weighed around 108 pounds. She is unsure when she lost all of her weight. Patient has not followed with family doctor and around 5 years. She did not have any insurance prior to presenting to the hospital and is currently pending Medicaid.   Patient has not had appropriate cancer screening and is due for low-dose CT of the lungs, mammogram, cervical cancer screening, colonoscopy. Of note patient had a CTA of the chest in 2018 which showed mediastinal lymphadenopathy, she was following with pulmonology at that time however did not follow with pulmonology as outpatient and has not had any recent imaging of the chest.    Patient lives at home alone and states that she is able to care for herself without any issues. Today patient states that she is feeling much better and denies any concerns. Patient would like referral to dermatology for management of her discoid lupus. She has been on Plaquenil and steroid creams in the past however is not currently taking any of these as she has not seen a dermatologist in around 2 years. Past Medical History:   Diagnosis Date    Colonoscopy refused     could not afford it.  Discoid lupus 2013    Seen Dr. Araceli Gaston. Bx discoid lupus. Under treatment. KAITLIN normal. Lesion on scalp. Patient on Plaquenil and steroid cream     Lump in chest     Lt side of the chest 5 x 5 cm cyst    Pneumonia     Tobacco abuse disorder       Past Surgical History:   Procedure Laterality Date     SECTION      OVARY REMOVAL      left    UPPER GASTROINTESTINAL ENDOSCOPY N/A 2021    EGD BIOPSY performed by Sharron Patrick MD at [facility]     Family History   Problem Relation Age of Onset    Substance Abuse Father     Diabetes Mother      Social History     Tobacco Use    Smoking status: Current Every Day Smoker     Packs/day: 1.00     Years: 20.00     Pack years: 20.00     Types: Cigarettes     Start date:     Smokeless tobacco: Never Used   Substance Use Topics    Alcohol use:  Yes     Alcohol/week: 3.0 standard drinks     Types: 3 Cans of beer per week     Comment: pt states she drinks around 8/9pm and drinks 2-3 beers      Current Outpatient Medications   Medication Sig Dispense Refill    sucralfate (CARAFATE) 1 GM tablet Take 1 g by mouth 4 times daily      pantoprazole (PROTONIX) 40 MG tablet Take 1 tablet by mouth every morning (before breakfast) 90 tablet 0    acetaminophen (TYLENOL) 500 MG tablet Take 500 mg by mouth every 6 hours as needed for Pain or Fever      hydroxychloroquine (PLAQUENIL) 200 MG tablet Take 1 tablet by mouth 2 times daily  (Patient not taking: Reported on 9/10/2021)       No current facility-administered medications for this visit. No Known Allergies    Health Maintenance   Topic Date Due    Hepatitis C screen  Never done    COVID-19 Vaccine (1) Never done    HIV screen  Never done    DTaP/Tdap/Td vaccine (1 - Tdap) Never done    Cervical cancer screen  Never done    Colon cancer screen colonoscopy  Never done    Shingles Vaccine (1 of 2) Never done    Pneumococcal 0-64 years Vaccine (1 of 2 - PPSV23) 03/27/2017    Lipid screen  06/10/2018    Breast cancer screen  07/26/2018    Low dose CT lung screening  04/08/2019    Flu vaccine (1) Never done    Hepatitis A vaccine  Aged Out    Hepatitis B vaccine  Aged Out    Hib vaccine  Aged Out    Meningococcal (ACWY) vaccine  Aged Out       Subjective:      Review of Systems   Constitutional: Negative for chills, fatigue and fever. HENT: Negative for ear pain, postnasal drip and trouble swallowing. Eyes: Negative for pain and visual disturbance. Respiratory: Negative for cough and shortness of breath. Cardiovascular: Negative for chest pain and palpitations. Gastrointestinal: Negative for abdominal pain, blood in stool, constipation, diarrhea, nausea and vomiting. Genitourinary: Negative for dysuria and urgency. Skin: Negative for rash and wound. Neurological: Negative for dizziness and headaches. Psychiatric/Behavioral: Negative for dysphoric mood. The patient is not nervous/anxious.         Objective:     Vitals:    09/10/21 0855 09/10/21 0907   BP: (!) 146/82 (!) 140/80   Site: Right Upper Arm Right Upper Arm   Position: Sitting Sitting   Cuff Size: Small Adult Medium Adult   Resp: 16    Temp: 96.4 °F (35.8 °C)    TempSrc: Temporal    SpO2: (!) 88%    Weight: 79 lb 9.6 oz (36.1 kg)    Height: 5' 4\" (1.626 m)        Body mass index is 13.66 kg/m². Wt Readings from Last 3 Encounters:   09/10/21 79 lb 9.6 oz (36.1 kg)   09/01/21 74 lb 11.2 oz (33.9 kg)   04/11/18 108 lb 4.8 oz (49.1 kg)     BP Readings from Last 3 Encounters:   09/10/21 (!) 140/80   09/01/21 (!) 101/58   08/31/21 (!) 109/54       Physical Exam  Vitals and nursing note reviewed. Constitutional:       General: She is not in acute distress. Appearance: She is well-developed. She is cachectic. She is ill-appearing. She is not diaphoretic. HENT:      Head: Normocephalic and atraumatic. Right Ear: External ear normal.      Left Ear: External ear normal.      Nose: Nose normal.   Eyes:      General: No scleral icterus. Right eye: No discharge. Left eye: No discharge. Conjunctiva/sclera: Conjunctivae normal.   Cardiovascular:      Rate and Rhythm: Normal rate and regular rhythm. Heart sounds: Normal heart sounds. No murmur heard. Pulmonary:      Effort: Pulmonary effort is normal.      Breath sounds: Normal breath sounds. Musculoskeletal:      Cervical back: Normal range of motion. Skin:     General: Skin is warm and dry. Findings: Rash present. No erythema. Rash is scaling. Comments: Scaling rash consistent with discoid lupus   Neurological:      Mental Status: She is alert and oriented to person, place, and time. Cranial Nerves: No cranial nerve deficit. Psychiatric:         Behavior: Behavior normal. Behavior is cooperative. Thought Content:  Thought content normal.         Judgment: Judgment normal.         Lab Results   Component Value Date    WBC 5.6 09/01/2021    HGB 9.0 (L) 09/01/2021    HCT 26.5 (L) 09/01/2021     09/01/2021    CHOL 190 06/10/2013    TRIG 55 06/10/2013    HDL 79 06/10/2013    LDLDIRECT 102 (H) 06/10/2013    AST 29 08/30/2021     (L) 09/01/2021    K 3.8 09/01/2021    CL 97 (L) 09/01/2021    CREATININE 0.3 (L) 09/01/2021    BUN 17 09/01/2021    CO2 23 09/01/2021    TSH 0.944 04/08/2018    INR 1.05 08/31/2021    GLUF 99 07/24/2015    LABGLOM >90 09/01/2021    MG 1.4 (L) 09/01/2021    CALCIUM 8.3 (L) 09/01/2021       Imaging Results:    XR CHEST (2 VW)    Result Date: 8/30/2021  PROCEDURE: XR CHEST (2 VW) CLINICAL INFORMATION: 62-year-old female with pneumonia and hypoxia. COMPARISON: No prior study. TECHNIQUE: PA and lateral views of the chest were obtained. FINDINGS: The lungs are hyperexpanded and there is flattening of the hemidiaphragms. There are pneumonic infiltrates throughout the left mid and lower lung fields. The cardiac silhouette and pulmonary vasculature are within normal limits. There is no significant pleural effusion or pneumothorax. Visualized portions of the upper abdomen are within normal limits. The osseous structures are intact. There are degenerative changes in the spine. There is osteopenia. No acute fractures or suspicious osseous lesions. Pneumonic infiltrates are scattered throughout the left lung. **This report has been created using voice recognition software. It may contain minor errors which are inherent in voice recognition technology. ** Final report electronically signed by Dr Joey London on 8/30/2021 3:41 PM    CT ABDOMEN PELVIS W IV CONTRAST Additional Contrast? None    Result Date: 8/30/2021  PROCEDURE: CT ABDOMEN PELVIS W IV CONTRAST CLINICAL INFORMATION: abd pain, weight loss COMPARISON: No prior study. TECHNIQUE: 5 mm axial imaging through the abdomen and pelvis with IV contrast.  Coronal and sagittal reconstructions were performed.  All CT scans at this facility use dose modulation, iterative reconstruction, and/or weight based dosing when appropriate to reduce the radiation dose to as low as reasonably achievable. CONTRAST: Isovue 370, 80 mL FINDINGS: LUNG BASES: Left lower lung consolidation and infiltrate. No effusion. LIVER/GALLBLADDER/BILIARY TREE: Decreased attenuation of the liver. No enhancing liver lesions. No gallstones. PANCREAS/SPLEEN: Pancreas unremarkable. Splenic granuloma. ADRENAL GLANDS/KIDNEYS: Unremarkable. BOWEL: 1. Nonobstructive. 2. Stool scattered in the colon. 3. Normal appendix. FREE AIR/FREE FLUID/INFLAMMATION: None. LYMPHADENOPATHY: 1. No pathologically enlarged lymph nodes. ABDOMINAL AORTA: Unremarkable. PELVIS: 1. Unremarkable. ABDOMINAL WALL: Unremarkable. MUSCULOSKELETAL: Unremarkable. OTHER: None. 1. Left lower lung consolidation/infiltrate. 2. No acute abdominal or pelvic findings. 3. No bowel obstruction. 4. Steatosis of the liver. **This report has been created using voice recognition software. It may contain minor errors which are inherent in voice recognition technology. ** Final report electronically signed by Dr. Kailee Ricci on 8/30/2021 1:03 PM        Assessment/Plan:     Wilner Castillo was seen today for established new doctor and referral - general.    Diagnoses and all orders for this visit:    COPD with exacerbation ([facility] [address]  -     CBC With Auto Differential; Future  -     Comprehensive Metabolic Panel; Future  -     Full PFT Study With Bronchodilator; Future    Discoid lupus  -     AFL - Elenita Johnson MD, Dermatology, [place]    Gastric ulcer with hemorrhage, unspecified chronicity    Anemia, unspecified type    Screening for HIV (human immunodeficiency virus)  -     HIV-1 and HIV-2 Antibodies; Future    Encounter for hepatitis C screening test for low risk patient  -     Hepatitis C Antibody; Future    Personal history of tobacco use  -     WA VISIT TO DISCUSS LUNG CA SCREEN W LDCT  -     CT Lung Screen (Annual); Future    Encounter for screening mammogram for malignant neoplasm of breast  -     SAIDA DIGITAL SCREEN W OR WO CAD BILATERAL;  Future    Screening, lipid  -     Lipid, Fasting; Future    Weight loss  -     TSH with Reflex; Future    Concerning patient. 30 pound weight loss over the past 3 years of unknown origin. Longstanding history of tobacco abuse will order low-dose CT of the chest for further evaluation and lung cancer screening. She does have a history of mediastinal lymphadenopathy from a CTA in 2018. We will also order mammogram as she has not had this completed in many years. She denies any breast lumps. Repeat blood work with CBC, CMP, lipid panel, TSH to follow patient's weight loss, anemia, abnormal labs while hospitalized. Screening HIV and hep C ordered. Referral to Dr. Bill Astorga for dermatology management of her discoid lupus. Follow-up with GI in 2 to 3 months for repeat EGD and consider colonoscopy for colon cancer screening at that time. Follow-up in 1 month to review lab work and test results. No follow-ups on file. Medications Prescribed:  No orders of the defined types were placed in this encounter. Future Appointments   Date Time Provider Doug Herrerai   10/12/2021 10:20 AM John San DO [facility] [address] [place] [address]       Patient given educational materials - see patient instructions. Discussed use, benefit, and sideeffects of prescribed medications. All patient questions answered. Pt voiced understanding. Reviewed health maintenance. Instructed to continue current medications, diet and exercise. Patient agreed with treatment plan. Follow up as directed.      Electronically signed by Justin Cross DO on 9/10/2021 at 10:54 AM  Low Dose CT (LDCT) Lung Screening criteria met   Age 55-77   Pack year smoking >30   Still smoking or less than 15 year since quit   No sign or symptoms of lung cancer   > 11 months since last LDCT     Risks and benefits of lung cancer screening with LDCT scans discussed:    Significance of positive screen - False-positive LDCT results often occur. 95% of all positive results do not lead to a diagnosis of cancer. Usually further imaging can resolve most false-positive results; however, some patients may require invasive procedures. Over diagnosis risk - 10% to 12% of screen-detected lung cancer cases are over diagnosedthat is, the cancer would not have been detected in the patient's lifetime without the screening. Need for follow up screens annually to continue lung cancer screening effectiveness     Risks associated with radiation from annual LDCT- Radiation exposure is about the same as for a mammogram, which is about 1/3 of the annual background radiation exposure from everyday life. Starting screening at age 54 is not likely to increase cancer risk from radiation exposure. Patients with comorbidities resulting in life expectancy of < 10 years, or that would preclude treatment of an abnormality identified on CT, should not be screened due to lack of benefit.     To obtain maximal benefit from this screening, smoking cessation and long-term abstinence from smoking is critical

## 2021-09-10 NOTE — PROGRESS NOTES
Attending attestation:  I personally performed and participated key or critical portions of the evaluation and management including personally performing the exam and medical decision making. I verify the accuracy of the documentation by the resident with the following addition or changes: New patient hospital follow-up for COPD exacerbation. Doing better since discharge. On room air. Hx discoid lupus also follows with dermatology. Has not seen them in 2 years so out of Plaquenil. Interested in referral.  BMI is only 13.66. Height is 5'4\". Weight is only 79 pounds up from 74 pounds in hospital.  No prior work-up. Was 108 pounds in 2018. Pending Medicaid. Recent CT abdomen okay. Note CTA chest in 2018 did show possible malignancy but has not been repeated. Did have melena in hospital. EGD with grade B esophagitis, antral ulcers, 1.5 cm duodenal ulcer. Biopsies pending. Started on carafate and protonix. No lower scope. Hemoglobin was 8.8. Sodium and magnesium were low. Will check labs and lung function testing. Patient is amenable to mammogram and CT lung screen. No obvious adenopathy on exam.  Also due for pap and colonoscopy but will start with ordered testing today; encourage these in follow-up. Stressed with patient that I am quite alarmed about her low weight. We need to figure out what is causing this and address it as soon as possible. She is aware of possible serious diagnosis such as malignancy and need to get ordered testing done and agrees to arrange transportation. Differential is quite broad and would could include known COPD and lupus, thyroid issues, eating disorder or many other causes also. [place] diet is encouraged at at this time. Close follow-up.    Electronically signed by Shelli Blanco MD on 9/10/2021 at 9:49 AM.

## 2021-09-10 NOTE — PATIENT INSTRUCTIONS
Thank you   1. Thank you for trusting us with your healthcare needs. You may receive a survey regarding today's visit. It would help us out if you would take a few moments to provide your feedback. We value your input. 2. Please bring in ALL medications BOTTLES, including inhalers, herbal supplements, over the counter, prescribed & non-prescribed medicine. The office would like actual medication bottles and a list.   3. Please note our OFFICE POLICIES:   a. Prior to getting your labs drawn, please check with your insurance company for benefits and eligibility of lab services. Often, insurance companies cover certain tests for preventative visits only. It is patient's responsibility to see what is covered. b. We are unable to change a diagnosis after the test has been performed. c. Lab orders will not be re-printed. Please hold onto your original lab orders and take them to your lab to be completed. d. If you no show your scheduled appointment three times, you will be dismissed from this practice. e. Adriana Joe must be completed 24 hours prior to your schedule appointment. 4. If the list below has been completed, PLEASE FAX RECORDS TO OUR OFFICE @ [phone].  Once the records have been received we will update your records at our office:  Health Maintenance Due   Topic Date Due    Hepatitis C screen  Never done    COVID-19 Vaccine (1) Never done    HIV screen  Never done    DTaP/Tdap/Td vaccine (1 - Tdap) Never done    Cervical cancer screen  Never done    Colon cancer screen colonoscopy  Never done    Shingles Vaccine (1 of 2) Never done    Pneumococcal 0-64 years Vaccine (1 of 2 - PPSV23) 03/27/2017    Lipid screen  06/10/2018    Breast cancer screen  07/26/2018    Flu vaccine (1) Never done           Tobacco Cessation Programs     Telephonic behavior modification   6-800-QUIT-NOW (106-2793)   Counseling service for those who are ready to quit using tobacco.     Available for uninsured [place] residents, Medicaid recipients, pregnant women, or patients whose health plans or employers are members of the [address] [place] [address] behavior modification   http://[place]. Quitlogix. org   Online support program to help patients through each step of the quitting process. Available 24 hours a day 7 days a week. Provides up to date researched based tool, step-by-step guides, and motivational messages. Online behavior modification   www.lungusa.org/stop-smoking/how-to-quit   HelpLine: [facility] (602-6662)   Email questions to:  Grace@ROKT    Website offers resources to help tobacco users figure out their reasons for quitting and then take the big step of quitting for good. Hypnosis   Location: [address] [place], [place]   Contact: David Katz, PhD at [phone]   Hypnosis for tobacco cessation   Cost $225 for the initial session and $175 for each session afterwards. Most patients require 6-8 sessions. There is the option to submit through the patients insurance. Hypnosis and behavior modification   Location: [place] [address],  [facility] [address] [place], [place]   Contact: Philip Feliciano, PhD at [phone]  [facility] and hypnosis for nicotine addition   Cost: For uninsured patients:  Please call above phone number  Cost for insured patients depends on patients insurance plan. Behavior modification   Location: [facility], [address] [place] [address]., [place], [place]   Contact: Franck Rich include four one-on-one appointments between the patient and a respiratory therapist.  The four appointments span over three weeks. The respiratory therapist schedules one of the appointments to occur 48 hours after the patients quit date.  Cost $100 total for the four sessions. Tobacco cessation products are not included in the cost and are not provided by [facility].         What is lung cancer screening?   Lung cancer screening is a way in which doctors check the lungs for early signs of cancer in people who have no symptoms of lung cancer. A low-dose CT scan uses much less radiation than a normal CT scan and shows a more detailed image of the lungs than a standard X-ray. The goal of lung cancer screening is to find cancer early, before it has a chance to grow, spread, or cause problems. One large study found that smokers who were screened with low-dose CT scans were less likely to die of lung cancer than those who were screened with standard X-ray. Below is a summary of the things you need to know regarding screening for lung cancer with low-dose computed tomography (LDCT). This is a screening program that involves routine annual screening with LDCT studies of the lung. The LDCTs are done using low-dose radiation that is not thought to increase your cancer risk. If you have other serious medical conditions (other cancers, congestive heart failure) that limit your life expectancy to less than 10 years, you should not undergo lung cancer screening with LDCT. The chance is 20%-60% that the LDCT result will show abnormalities. This would require additional testing which could include repeat imaging or even invasive procedures. Most (about 95%) of \"abnormal\" LDCT results are false in the sense that no lung cancer is ultimately found. Additionally, some (about 10%) of the cancers found would not affect your life expectancy, even if undetected and untreated. If you are still smoking, the single most important thing that you can do to reduce your risk of dying of lung cancer is to quit. For this screening to be covered by Medicare and most other insurers, strict criteria must be met. If you do not meet these criteria, but still wish to undergo LDCT testing, you will be required to sign a waiver indicating your willingness to pay for the scan.

## 2021-09-11 NOTE — TELEPHONE ENCOUNTER
On review of your note I noticed this patient had a sat in clinic 88%. Patient was speaking in full sentences and had no obvious distress and does have known COPD but this is pretty low. Does patient have a pulse oximeter? I think this is chronic in nature but she may need home oxygen if this is accurate. I would recommend either having patient check ambulatory sat at home with pulse oximeter and to confirm that it is not dropping below 88% with exertion or having patent pop into the office for a 6 minute walk test so that we can prescribe home oxygen if needed. Thanks.

## 2021-09-14 NOTE — TELEPHONE ENCOUNTER
I called the patient to offer her an appointment, to check her oxygen levels. The patient states that she has no insurance to cover the cost of a pulse oximeter or appointment. The patient states that she will call the office with any questions.

## 2021-09-14 NOTE — TELEPHONE ENCOUNTER
Can we call patient to check if she has a pulse oximeter at home? If not can we have her come in for a nurse visit for vitals.

## 2021-10-05 NOTE — DISCHARGE SUMMARY
Hospitalist Discharge Summary        Patient: Michelle Dennison  YOB: 1955  MRN: [id number]   Acct: [de-identified]    Primary Care Physician: Shaw Dougherty DO    Admit date  8/30/2021    Discharge date:  9/1/2021    Chief Complaint on presentation :-  Weakness     Discharge Assessment and Plan:-   1. Acute respiratory failure, hypoxia: Pt's baseline is RA. Pt needed 2L, weaned back to RA. Continue to monitor. IS.   2. COPD, acute exacerbation: Ceftriaxone & Azithromycin were given while in the hospital. IS. Acapella. Breathing treatments. 3. Melena with abd pain: GI consulted. EGD showed \"Esophagitis, gastritis with 3 ulcers in stomach and one ulcer in stomach. \"- Daily PPI, Carafate, EGD in 2-3 months. GI okay with DC. 4. Hyponatremia: Sodium 129, 131, 131, 131. Likely due to dehydration. 5. Hypokalemia: Potassium 3.1, 3.8   6. Anemia, acute on chronic: Hgb 11.2, 10.1, 9.6, 9.1. CBC in the AM.   7. Severe protein calorie malnutrition: BMI 11.41. [address] consulted.      Initial H and P and Hospital course:-  Initial H&P \"Patient is a 73-year-old female with history of COPD, discoid lupus, and tobacco use presents to the hospital generalized weakness and lethargy.  She states that she is also experienced shortness of breath.     These symptoms have been ongoing for several weeks, but have worsened over the last few days. Liza Phillips states that she has been unable to follow-up with her dermatologist for treatment for her discoid lupus during the pandemic.  She has run out of her Plaquenil as well as her topical cream.  Her rash has worsened since this time.     She also complains of shortness of breath as well as a cough with some productive sputum.  Denies any fevers, but admits to subjective chills.  Denies any chest pain, nausea, or vomiting.     She also complains of periumbilical and epigastric pain as well as 3-day history of black stools.  Denies any use of NSAIDs or recent alcohol use.  She does not take any antiplatelet or anticoagulation. \"     8/31: No acute events overnight. Patient states that she has a okay diet but has not been eating well the last few days due to abdominal pain. Patient denies much alcohol use. Patient is a poor historian. 9/1: GI okay with DC. Pt's Hgb stable. Pt states that her appetite is improving. Pt was DC'ed to NH. On the day of discharge, it was explained to the patient that it was very important to follow up with his PCP and GI to have continued care. Appointments were made and information was given. Physical Exam:-  General appearance: Chronically ill appearing white female, very thin   HEENT: Pupils equal, round, and reactive to light. Conjunctivae/corneas clear. Neck: Supple, with full range of motion. No jugular venous distention. Trachea midline. Respiratory:  Normal respiratory effort on RA. Breath sounds diminished. Cardiovascular: Regular rate and rhythm with normal S1/S2 without murmurs, rubs or gallops. Abdomen: Soft, non-tender, non-distended with normal bowel sounds. Musculoskeletal: passive and active ROM x 4 extremities. Skin: Skin color, texture, turgor normal.  No rashes or lesions. Neurologic:  Neurovascularly intact without any focal sensory/motor deficits. Cranial nerves: II-XII intact, grossly non-focal.  Psychiatric: Alert and oriented, thought content appropriate, normal insight  Capillary Refill: Brisk,< 3 seconds   Peripheral Pulses: +2 palpable, equal bilaterally     Vitals:   No data found.   Weight:   Weight: 74 lb 11.2 oz (33.9 kg)   24 hour intake/output:   No intake or output data in the 24 hours ending 10/05/21 1311      Discharge Medications:-      Medication List      START taking these medications    pantoprazole 40 MG tablet  Commonly known as: PROTONIX  Take 1 tablet by mouth every morning (before breakfast)        CONTINUE taking these medications    acetaminophen 500 MG tablet  Commonly known as: TYLENOL     hydroxychloroquine 200 MG tablet  Commonly known as: PLAQUENIL        STOP taking these medications    clobetasol 0.05 % cream  Commonly known as: TEMOVATE     fluocinonide 0.05 % gel  Commonly known as: LIDEX     Tylenol Cold Head Congestion 5-2- MG Misc  Generic drug: [id number]           Where to Get Your Medications      These medications were sent to [address] , [address] [facility] [address] [facility]  [address] [facility], [address] [address]    Phone: [phone]   · pantoprazole 40 MG tablet          Labs :-  No results found for this or any previous visit (from the past 72 hour(s)). Microbiology:    Blood culture #1:   Lab Results   Component Value Date    BC No growth-preliminary No growth  08/30/2021    BC No growth-preliminary No growth  08/30/2021       Blood culture #2:No results found for: Reyna Alves    Organism:    Lab Results   Component Value Date    LABGRAM  04/09/2018     Quality of sputum specimen: Specimen acceptable. Many segmented neutrophils observed. Rare epithelial cells observed. Rare gram positive cocci occurring singly and in pairs. Rare gram negative bacilli. Urinalysis:      Lab Results   Component Value Date    NITRU NEGATIVE 08/30/2021    WBCUA 0-2 08/30/2021    BACTERIA NONE SEEN 08/30/2021    RBCUA 5-10 08/30/2021    BLOODU MODERATE 08/30/2021    SPECGRAV 1.010 10/01/2015    GLUCOSEU NEGATIVE 08/30/2021       Radiology:-  XR CHEST (2 VW)    Result Date: 8/30/2021  PROCEDURE: XR CHEST (2 VW) CLINICAL INFORMATION: 82-year-old female with pneumonia and hypoxia. COMPARISON: No prior study. TECHNIQUE: PA and lateral views of the chest were obtained. FINDINGS: The lungs are hyperexpanded and there is flattening of the hemidiaphragms. There are pneumonic infiltrates throughout the left mid and lower lung fields. The cardiac silhouette and pulmonary vasculature are within normal limits. There is no significant pleural effusion or pneumothorax. Visualized portions of the upper abdomen are within normal limits. The osseous structures are intact. There are degenerative changes in the spine. There is osteopenia. No acute fractures or suspicious osseous lesions. Pneumonic infiltrates are scattered throughout the left lung. **This report has been created using voice recognition software. It may contain minor errors which are inherent in voice recognition technology. ** Final report electronically signed by Dr Sil Guerrero on 8/30/2021 3:41 PM    CT ABDOMEN PELVIS W IV CONTRAST Additional Contrast? None    Result Date: 8/30/2021  PROCEDURE: CT ABDOMEN PELVIS W IV CONTRAST CLINICAL INFORMATION: abd pain, weight loss COMPARISON: No prior study. TECHNIQUE: 5 mm axial imaging through the abdomen and pelvis with IV contrast.  Coronal and sagittal reconstructions were performed. All CT scans at this facility use dose modulation, iterative reconstruction, and/or weight based dosing when appropriate to reduce the radiation dose to as low as reasonably achievable. CONTRAST: Isovue 370, 80 mL FINDINGS: LUNG BASES: Left lower lung consolidation and infiltrate. No effusion. LIVER/GALLBLADDER/BILIARY TREE: Decreased attenuation of the liver. No enhancing liver lesions. No gallstones. PANCREAS/SPLEEN: Pancreas unremarkable. Splenic granuloma. ADRENAL GLANDS/KIDNEYS: Unremarkable. BOWEL: 1. Nonobstructive. 2. Stool scattered in the colon. 3. Normal appendix. FREE AIR/FREE FLUID/INFLAMMATION: None. LYMPHADENOPATHY: 1. No pathologically enlarged lymph nodes. ABDOMINAL AORTA: Unremarkable. PELVIS: 1. Unremarkable. ABDOMINAL WALL: Unremarkable. MUSCULOSKELETAL: Unremarkable. OTHER: None. 1. Left lower lung consolidation/infiltrate. 2. No acute abdominal or pelvic findings. 3. No bowel obstruction. 4. Steatosis of the liver. **This report has been created using voice recognition software.   It may contain minor errors which are inherent in voice recognition technology. ** Final report electronically signed by Dr. Virgie Fernández on 8/30/2021 1:03 PM       Follow-up scheduled after discharge :-    in the next few days with Ricardo Camacho, DO  in the next few weeks with GI     Consultations during this hospital stay:-  [] NONE [] Cardiology  [] Nephrology  [] Hemo onco   [x] GI   [] ID  [] Endocrine  [] Pulm    [] Neuro    [] Psych   [] Urology  [] ENT   [] G SURGERY   []Ortho    []CV surg    [] Palliative  [] Hospice [] Pain management   []    []TCU   [] PT/OT  OTHERS:-    Disposition: SNF  Condition at Discharge: Stable    Time Spent:- 40 minutes    Electronically signed by JOHANN Myers on 10/5/21 at 1:11 PM EDT   Discharging Hospitalist

## 2021-11-01 ENCOUNTER — HOSPITAL ENCOUNTER (OUTPATIENT)
Age: 66
Setting detail: OUTPATIENT SURGERY
Discharge: HOME | End: 2021-11-01
Attending: INTERNAL MEDICINE | Admitting: INTERNAL MEDICINE
Payer: COMMERCIAL

## 2021-11-01 ENCOUNTER — ANESTHESIA (OUTPATIENT)
Dept: ENDOSCOPY | Age: 66
End: 2021-11-01
Payer: COMMERCIAL

## 2021-11-01 ENCOUNTER — ANESTHESIA EVENT (OUTPATIENT)
Dept: ENDOSCOPY | Age: 66
End: 2021-11-01
Payer: COMMERCIAL

## 2021-11-01 VITALS
OXYGEN SATURATION: 97 % | DIASTOLIC BLOOD PRESSURE: 60 MMHG | SYSTOLIC BLOOD PRESSURE: 107 MMHG | RESPIRATION RATE: 16 BRPM

## 2021-11-01 VITALS
SYSTOLIC BLOOD PRESSURE: 135 MMHG | RESPIRATION RATE: 18 BRPM | TEMPERATURE: 96.4 F | WEIGHT: 78.4 LBS | HEIGHT: 64 IN | BODY MASS INDEX: 13.38 KG/M2 | HEART RATE: 98 BPM | DIASTOLIC BLOOD PRESSURE: 64 MMHG | OXYGEN SATURATION: 99 %

## 2021-11-01 PROCEDURE — 7100000010 HC PHASE II RECOVERY - FIRST 15 MIN: Performed by: INTERNAL MEDICINE

## 2021-11-01 PROCEDURE — 2580000003 HC RX 258: Performed by: INTERNAL MEDICINE

## 2021-11-01 PROCEDURE — 6360000002 HC RX W HCPCS

## 2021-11-01 PROCEDURE — 2720000010 HC SURG SUPPLY STERILE: Performed by: INTERNAL MEDICINE

## 2021-11-01 PROCEDURE — 7100000011 HC PHASE II RECOVERY - ADDTL 15 MIN: Performed by: INTERNAL MEDICINE

## 2021-11-01 PROCEDURE — 2500000003 HC RX 250 WO HCPCS

## 2021-11-01 PROCEDURE — 3609012400 HC EGD TRANSORAL BIOPSY SINGLE/MULTIPLE: Performed by: INTERNAL MEDICINE

## 2021-11-01 PROCEDURE — 3700000001 HC ADD 15 MINUTES (ANESTHESIA): Performed by: INTERNAL MEDICINE

## 2021-11-01 PROCEDURE — 3700000000 HC ANESTHESIA ATTENDED CARE: Performed by: INTERNAL MEDICINE

## 2021-11-01 RX ORDER — LIDOCAINE HYDROCHLORIDE 10 MG/ML
INJECTION, SOLUTION INFILTRATION; PERINEURAL PRN
Status: DISCONTINUED | OUTPATIENT
Start: 2021-11-01 | End: 2021-11-01 | Stop reason: SDUPTHER

## 2021-11-01 RX ORDER — SODIUM CHLORIDE 450 MG/100ML
INJECTION, SOLUTION INTRAVENOUS CONTINUOUS
Status: DISCONTINUED | OUTPATIENT
Start: 2021-11-01 | End: 2021-11-01 | Stop reason: HOSPADM

## 2021-11-01 RX ORDER — SODIUM CHLORIDE 0.9 % (FLUSH) 0.9 %
5-40 SYRINGE (ML) INJECTION EVERY 12 HOURS SCHEDULED
Status: DISCONTINUED | OUTPATIENT
Start: 2021-11-01 | End: 2021-11-01 | Stop reason: HOSPADM

## 2021-11-01 RX ORDER — SODIUM CHLORIDE 9 MG/ML
25 INJECTION, SOLUTION INTRAVENOUS PRN
Status: DISCONTINUED | OUTPATIENT
Start: 2021-11-01 | End: 2021-11-01 | Stop reason: HOSPADM

## 2021-11-01 RX ORDER — SODIUM CHLORIDE 0.9 % (FLUSH) 0.9 %
5-40 SYRINGE (ML) INJECTION PRN
Status: DISCONTINUED | OUTPATIENT
Start: 2021-11-01 | End: 2021-11-01 | Stop reason: HOSPADM

## 2021-11-01 RX ORDER — PROPOFOL 10 MG/ML
INJECTION, EMULSION INTRAVENOUS PRN
Status: DISCONTINUED | OUTPATIENT
Start: 2021-11-01 | End: 2021-11-01 | Stop reason: SDUPTHER

## 2021-11-01 RX ADMIN — PROPOFOL 30 MG: 10 INJECTION, EMULSION INTRAVENOUS at 11:42

## 2021-11-01 RX ADMIN — PROPOFOL 30 MG: 10 INJECTION, EMULSION INTRAVENOUS at 11:44

## 2021-11-01 RX ADMIN — SODIUM CHLORIDE 1000 ML: 9 INJECTION, SOLUTION INTRAVENOUS at 11:14

## 2021-11-01 RX ADMIN — PROPOFOL 40 MG: 10 INJECTION, EMULSION INTRAVENOUS at 11:35

## 2021-11-01 RX ADMIN — PROPOFOL 40 MG: 10 INJECTION, EMULSION INTRAVENOUS at 11:32

## 2021-11-01 RX ADMIN — LIDOCAINE HYDROCHLORIDE 50 MG: 10 INJECTION, SOLUTION INFILTRATION; PERINEURAL at 11:32

## 2021-11-01 RX ADMIN — PROPOFOL 30 MG: 10 INJECTION, EMULSION INTRAVENOUS at 11:38

## 2021-11-01 ASSESSMENT — PAIN - FUNCTIONAL ASSESSMENT: PAIN_FUNCTIONAL_ASSESSMENT: 0-10

## 2021-11-01 ASSESSMENT — PAIN SCALES - GENERAL
PAINLEVEL_OUTOF10: 0
PAINLEVEL_OUTOF10: 0

## 2021-11-01 NOTE — PROGRESS NOTES
EGD completed, tolerated well. Biopsies taken, removed with cold forceps. 2 jars labeled and sent to lab. Photos taken. Scope  used.

## 2021-11-01 NOTE — OP NOTE
[facility]    Patient: Carolyn Mehta  : 1955  Acct#: [de-identified]  Date of evaluation: 2021  Primary Care Physician: Sky James DO     Procedure:    [x]EGD    []Enteroscopy    [x]Biopsy   []Dilation      []PEG    []PEG change    []PEG removal  []Variceal banding    []Control of bleeding  []Destruction of lesion by [facility]    []Stent Placement  []Foreign Body Removal    []Snare Polypectomy  []Other:     []Aborted:        []Colonoscopy  []Flex Sigmoid []EUS, rectal     []Biopsy   []Snare Polypectomy    []Control of bleeding  []Destruction of lesion by [facility]    []Stent Placement  []Foreign Body Removal    []Dilation    []Other:    []Aborted:   []Tattoo    Indication:   Gastric ulcers    History:  The patient is a 61 y.o. female with COPD and SLE admitted 21 for weakness, fatigue, lethargy, dyspnea due to pneumonia. She was malnourished and has wt loss, melena, anemia, and LUQ pain. I performed an EGD 21 demonstrating diffuse gastritis with 3 small antral ulcers, a 1.5 cm clean-based ulcer in the duodenal bulb, and LA grade B esophagitis. Biopsies for Hpylori were normal.  She was placed on a daily ppi and sucralfate. Physical Exam:  VITALS: BP (!) 156/77   Pulse 108   Temp 97.5 °F (36.4 °C) (Temporal)   Resp 16   Ht 5' 4\" (1.626 m)   Wt 78 lb 6.4 oz (35.6 kg)   SpO2 97%   BMI 13.46 kg/m²   The patient is a 61 y.o. female in no acute distress. HEAD: Normal cephalic/atraumatic. Extra-occular motions intact bilaterally. NECK: No lymphadenopathy or bruits. CHEST: Rises equally on inspiration. Clear to auscultation bilaterally. CARDIOVASCULAR: Regular rate and rhythm without murmurs, rubs or gallops. ABDOMEN: Soft, nontender, and nondistended with normal bowel sounds. No Hepatosplemomegaly. UPPER EXTREMITIES: no cyanosis, clubbing, or edema. DERM: no rash or jaundice.   LOWER EXTREMITIES: no cyanosis, clubbing, or edema.  NEURO: Alert and oriented times four. Patient moves all extremities and has gross sensation in all extremities. ASA: ASA 3 - Patient with moderate systemic disease with functional limitations    MEDICATION:    MAC/Propofol/Anesthesia:  Yes     Photo:  Yes  Biopsy:  Yes      Description of Procedure: The risks and benefits of the procedure were described to the patient or their representative if unable to give consent including but not limited to bleeding, infection, poking a hole someplace requiring surgery to fix it, having a reaction to medication, and death. The patient or their representative if unable to give consent understood these risks and provided informed consent. Airway was assessed and is adequate for the planned sedation. Anesthesia: MAC  Estimated Blood Loss: less than 50   Complications: None  Specimens: Was Obtained:  see below     Sedation was administered by anesthesia who monitored the patient during the procedure. The patient was placed in the left lateral decubitus position. A forward-viewing Olympus endoscope was lubricated and inserted through the mouth into the oropharynx. Under direct visualization, the upper esophagus was intubated. The scope was advanced through the esophagus and stomach to second portion of duodenum. Scope was slowly withdrawn with good views of mucosal surfaces. The scope was retroflexed in the fundus. Findings and maneuvers are listed in impression below. The patient tolerated the procedure well. The scope was removed. There were no immediate complications. IMPRESSION:  1. Esophagus - Irregular GEJ, biopsied   2. Stomach - diffuse gastritis, biopsied for Hpylori using Fahad criteria  3. Duodenum - normal   4. Previous ulcers have healed. RECOMMENDATIONS:    1. Await pathology   2. Continue pantoprazole and sucralfate  3. Follow-up with GI in 6-8 weeks at  [place] [address].  NOEL REGALADO II.FOZ[place], [address] [facility] Kristen Martin MD, MD  11:47 AM 11/1/2021

## 2021-11-01 NOTE — ANESTHESIA POSTPROCEDURE EVALUATION
Department of Anesthesiology  Postprocedure Note    Patient: Adriano Oconnor  MRN: [id number]  YOB: 1955  Date of evaluation: 11/1/2021  Time:  11:57 AM     Procedure Summary     Date: 11/01/21 Room / Location: [address] [place] [address] / [address] [place] [address]    Anesthesia Start: 7377 Anesthesia Stop: 8572    Procedure: EGD BIOPSY (N/A ) Diagnosis: (GASTRIC ULCER)    Surgeons: Brant Yoo MD Responsible Provider: Grey Gilmore MD    Anesthesia Type: MAC ASA Status: 3          Anesthesia Type: MAC    Adria Phase I: Adria Score: 10    Adria Phase II: Adria Score: 10    Last vitals: Reviewed and per EMR flowsheets.        Anesthesia Post Evaluation    Patient location during evaluation: bedside  Patient participation: complete - patient participated  Level of consciousness: awake and alert  Pain score: 0  Airway patency: patent  Nausea & Vomiting: no nausea and no vomiting  Complications: no  Cardiovascular status: hemodynamically stable  Respiratory status: acceptable  Hydration status: euvolemic

## 2021-11-01 NOTE — H&P
[facility]    Pre-Endoscopy H&PE      Patient: Luis Enrique Liu    : 1955    Acct#: [de-identified]  Primary Care Physician: Janusz Lee DO   Date of evaluation: 2021    Planned Procedure:    [x]EGD    []Enteroscopy    []PEG    []PEG change    []PEG removal  []Variceal banding    []Biopsy   []Dilation      []Control of bleeding  []Destruction of lesion by [facility]    []Stent Placement  []Foreign Body Removal    []Snare Polypectomy  []Other:       []Colonoscopy  []Flex Sigmoid []EUS, rectal      []Biopsy   []Dilation      []Control of bleeding  []Destruction of lesion by [facility]    []Stent Placement  []Foreign Body Removal    []Snare Polypectomy  []Other:      Planned Sedation  []Conscious Sedation [x]MAC/Propofol []Anesthesia    []None    Airway:  Adequate for planned sedation    Indication:   Gastric ulcers    History:  The patient is a 61 y.o. female with COPD and SLE admitted 21 for weakness, fatigue, lethargy, dyspnea due to pneumonia. She was malnourished and has wt loss, melena, anemia, and LUQ pain. I performed an EGD 21 demonstrating diffuse gastritis with 3 small antral ulcers, a 1.5 cm clean-based ulcer in the duodenal bulb, and LA grade B esophagitis. Biopsies for Hpylori were normal.  She was placed on a daily ppi and sucralfate. Physical Exam:  VITALS: BP (!) 156/77   Pulse 108   Temp 97.5 °F (36.4 °C) (Temporal)   Resp 16   Ht 5' 4\" (1.626 m)   Wt 78 lb 6.4 oz (35.6 kg)   SpO2 97%   BMI 13.46 kg/m²   The patient is a 61 y.o. female in no acute distress. HEAD: Normal cephalic/atraumatic. Extra-occular motions intact bilaterally. NECK: No lymphadenopathy or bruits. CHEST: Rises equally on inspiration. Clear to auscultation bilaterally. CARDIOVASCULAR: Regular rate and rhythm without murmurs, rubs or gallops. ABDOMEN: Soft, nontender, and nondistended with normal bowel sounds. No Hepatosplemomegaly.   UPPER EXTREMITIES: no cyanosis, clubbing, or edema. DERM: no rash or jaundice. LOWER EXTREMITIES: no cyanosis, clubbing, or edema. NEURO: Alert and oriented times four. Patient moves all extremities and has gross sensation in all extremities. ASA: ASA 3 - Patient with moderate systemic disease with functional limitations    Past Medical History:        Diagnosis Date    Colonoscopy refused     could not afford it.  Discoid lupus 2013    Seen Dr. Jared Castillo. Bx discoid lupus. Under treatment. KAITLIN normal. Lesion on scalp. Patient on Plaquenil and steroid cream     Lump in chest     Lt side of the chest 5 x 5 cm cyst    Pneumonia     Tobacco abuse disorder      Past Surgical History:        Procedure Laterality Date     SECTION      OVARY REMOVAL      left    UPPER GASTROINTESTINAL ENDOSCOPY N/A 2021    EGD BIOPSY performed by Mike Boyd MD [facility]     Allergies:  Patient has no known allergies. Home Meds:  Prior to Admission medications    Medication Sig Start Date End Date Taking? Authorizing Provider   sucralfate (CARAFATE) 1 GM tablet Take 1 g by mouth 4 times daily   Yes Historical Provider, MD   pantoprazole (PROTONIX) 40 MG tablet Take 1 tablet by mouth every morning (before breakfast) 21  Yes Mike Boyd MD   acetaminophen (TYLENOL) 500 MG tablet Take 500 mg by mouth every 6 hours as needed for Pain or Fever   Yes Historical Provider, MD   hydroxychloroquine (PLAQUENIL) 200 MG tablet Take 1 tablet by mouth 2 times daily   Patient not taking: Reported on 9/10/2021 12/10/13   Historical Provider, MD     Social History:   Social History     Socioeconomic History    Marital status:       Spouse name: Not on file    Number of children: 3    Years of education: 5    Highest education level: Not on file   Occupational History    Not on file   Tobacco Use    Smoking status: Current Every Day Smoker     Packs/day: 1.00     Years: 20.00     Pack years: 20.00     Types: Cigarettes     Start date: 1973    Smokeless tobacco: Never Used   Substance and Sexual Activity    Alcohol use: Yes     Alcohol/week: 3.0 standard drinks     Types: 3 Cans of beer per week     Comment: pt states she drinks around 8/9pm and drinks 2-3 beers    Drug use: No    Sexual activity: Yes     Partners: Male   Other Topics Concern    Not on file   Social History Narrative    Not on file     Social Determinants of Health     Financial Resource Strain: Low Risk     Difficulty of Paying Living Expenses: Not hard at all   Food Insecurity: No Food Insecurity    Worried About Running Out of Food in the Last Year: Never true    Aziza of Food in the Last Year: Never true   Transportation Needs:     Lack of Transportation (Medical):  Lack of Transportation (Non-Medical):    Physical Activity:     Days of Exercise per Week:     Minutes of Exercise per Session:    Stress:     Feeling of Stress :    Social Connections:     Frequency of Communication with Friends and Family:     Frequency of Social Gatherings with Friends and Family:     Attends [employer] Services:     Active Member of Clubs or Organizations:     Attends Club or Organization Meetings:     Marital Status:    Intimate Partner Violence:     Fear of Current or Ex-Partner:     Emotionally Abused:     Physically Abused:     Sexually Abused:      Family History:   No GI malignancies     ROS:  GENERAL: No fever or chills. NEURO: No headache or seizure. EYES: No diplopia or vision loss. CARDIOVASCULAR: No chest pain or palpitations. RESPIRATORY: No dyspnea or cough. GI: NO melena. NO hematochezia   : No dysuria or hematuria. GYN: No discharge or abnormal bleeding. MUSCULOSKELETAL: No new arthralgias or myalgias  DERM: No rash or jaundice. ENDOCRINE: No polyuria or polydipsia. PSYCH: No anxiety or depression.     Informed Consent:  The risks and benefits of the procedure have been discussed with either the patient or if they cannot consent, their representative. Assessment:  Patient examined and appropriate for planned sedation and procedure. Plan:  Proceed with planned sedation and procedure.     Josh Buitrago MD, MD  11:21 AM 11/1/2021

## 2021-11-01 NOTE — PROGRESS NOTES
Recovery mode, denies discomfort. Taking fluids. Dr. Ros De Souza discussed findings. Discharge instructions provided and understanding verbalized.

## 2021-11-01 NOTE — ANESTHESIA PRE PROCEDURE
Department of Anesthesiology  Preprocedure Note       Name:  Yudy Palomo   Age:  61 y.o.  :  1955                                          MRN:  [id number]         Date:  2021      Surgeon: Juarez Gan):  Inder Lovell MD    Procedure: Procedure(s):  EGD    Medications prior to admission:   Prior to Admission medications    Medication Sig Start Date End Date Taking?  Authorizing Provider   sucralfate (CARAFATE) 1 GM tablet Take 1 g by mouth 4 times daily   Yes Historical Provider, MD   pantoprazole (PROTONIX) 40 MG tablet Take 1 tablet by mouth every morning (before breakfast) 21  Yes Inder Lovell MD   acetaminophen (TYLENOL) 500 MG tablet Take 500 mg by mouth every 6 hours as needed for Pain or Fever   Yes Historical Provider, MD   hydroxychloroquine (PLAQUENIL) 200 MG tablet Take 1 tablet by mouth 2 times daily   Patient not taking: Reported on 9/10/2021 12/10/13   Historical Provider, MD       Current medications:    Current Facility-Administered Medications   Medication Dose Route Frequency Provider Last Rate Last Admin    0.45 % sodium chloride infusion   IntraVENous Continuous Piter Dodd MD        sodium chloride flush 0.9 % injection 5-40 mL  5-40 mL IntraVENous 2 times per day Inder Lovell MD        sodium chloride flush 0.9 % injection 5-40 mL  5-40 mL IntraVENous PRN Inder Lovell MD        0.9 % sodium chloride infusion  25 mL IntraVENous PRN Inder Lovell  mL/hr at 21 1114 1,000 mL at 21 1114       Allergies:  No Known Allergies    Problem List:    Patient Active Problem List   Diagnosis Code    Tobacco abuse disorder Z72.0    Discoid lupus L93.0    Hematuria R31.9    Weight loss R63.4    Cyst of right ovary N83.201    COPD with exacerbation ([address] J44.1    Mediastinal lymphadenopathy R59.0    Pneumonitis J18.9    Hyponatremia E87.1    Thyroid nodules - needs outpatient thyroid US for further evaluation E04.1    Interstitial pneumonia ([facility] J84.9    Pneumonia J18.9    Gastric ulcer with hemorrhage K25.4    Severe malnutrition ([facility] E43       Past Medical History:        Diagnosis Date    Colonoscopy refused     could not afford it.  Discoid lupus 2013    Seen Dr. Davis [facility]. Bx discoid lupus. Under treatment. KAITLIN normal. Lesion on scalp. Patient on Plaquenil and steroid cream     Lump in chest     Lt side of the chest 5 x 5 cm cyst    Pneumonia     Tobacco abuse disorder        Past Surgical History:        Procedure Laterality Date     SECTION      OVARY REMOVAL      left    UPPER GASTROINTESTINAL ENDOSCOPY N/A 2021    EGD BIOPSY performed by Antonio Randle MD at [facility]       Social History:    Social History     Tobacco Use    Smoking status: Current Every Day Smoker     Packs/day: 1.00     Years: 20.00     Pack years: 20.00     Types: Cigarettes     Start date:     Smokeless tobacco: Never Used   Substance Use Topics    Alcohol use:  Yes     Alcohol/week: 3.0 standard drinks     Types: 3 Cans of beer per week     Comment: pt states she drinks around 8/9pm and drinks 2-3 beers                                Ready to quit: Not Answered  Counseling given: Not Answered      Vital Signs (Current):   Vitals:    21 1105   BP: (!) 156/77   Pulse: 108   Resp: 16   Temp: 36.4 °C (97.5 °F)   TempSrc: Temporal   SpO2: 97%   Weight: 78 lb 6.4 oz (35.6 kg)   Height: 5' 4\" (1.626 m)                                              BP Readings from Last 3 Encounters:   21 (!) 156/77   09/10/21 (!) 140/80   21 (!) 101/58       NPO Status: Time of last liquid consumption: 0800 (MEDS WITH SIP OF WATER)                        Time of last solid consumption: 1800                        Date of last liquid consumption: 21                        Date of last solid food consumption: 10/31/21    BMI:   Wt Readings from Last 3 Encounters:   21 78 lb 6.4 oz (35.6 kg) 09/10/21 79 lb 9.6 oz (36.1 kg)   09/01/21 74 lb 11.2 oz (33.9 kg)     Body mass index is 13.46 kg/m². CBC:   Lab Results   Component Value Date    WBC 5.6 09/01/2021    RBC 2.51 09/01/2021    HGB 9.0 09/01/2021    HCT 26.5 09/01/2021    .8 09/01/2021    RDW 13.0 04/10/2018     09/01/2021       CMP:   Lab Results   Component Value Date     09/01/2021    K 3.8 09/01/2021    CL 97 09/01/2021    CO2 23 09/01/2021    BUN 17 09/01/2021    CREATININE 0.3 09/01/2021    GFRAA >60 01/11/2017    LABGLOM >90 09/01/2021    GLUCOSE 142 09/01/2021    PROT 7.4 08/30/2021    CALCIUM 8.3 09/01/2021    BILITOT 0.5 08/30/2021    ALKPHOS 69 08/30/2021    AST 29 08/30/2021    ALT 25 08/30/2021       POC Tests: No results for input(s): POCGLU, POCNA, POCK, POCCL, POCBUN, POCHEMO, POCHCT in the last 72 hours. Coags:   Lab Results   Component Value Date    INR 1.05 08/31/2021    APTT 28.0 04/08/2018       HCG (If Applicable): No results found for: PREGTESTUR, PREGSERUM, HCG, HCGQUANT     ABGs: No results found for: PHART, PO2ART, UEP0OEI, PCZ5PVB, BEART, U3SKCARR     Type & Screen (If Applicable):  No results found for: LABABO, LABRH    Drug/Infectious Status (If Applicable):  No results found for: HIV, HEPCAB    COVID-19 Screening (If Applicable):   Lab Results   Component Value Date    COVID19 Not Detected 08/31/2021           Anesthesia Evaluation  Patient summary reviewed and Nursing notes reviewed  Airway: Mallampati: II        Dental:    (+) upper dentures and lower dentures      Pulmonary:   (+) decreased breath sounds,                             Cardiovascular:            Rhythm: regular                      Neuro/Psych:               GI/Hepatic/Renal:             Endo/Other:                     Abdominal:       Abdomen: soft. Vascular: Other Findings:             Anesthesia Plan      MAC     ASA 3       Induction: intravenous.                           Justus Harvey APRN - CRNA 11/1/2021

## 2021-11-02 PROCEDURE — 88305 TISSUE EXAM BY PATHOLOGIST: CPT

## 2021-11-02 PROCEDURE — 88313 SPECIAL STAINS GROUP 2: CPT

## 2021-11-02 PROCEDURE — 88342 IMHCHEM/IMCYTCHM 1ST ANTB: CPT

## 2023-02-23 ENCOUNTER — HOSPITAL ENCOUNTER (INPATIENT)
Age: 68
LOS: 4 days | Discharge: HOME | DRG: 139 | End: 2023-02-27
Admitting: HOSPITALIST
Payer: MEDICAID

## 2023-02-23 ENCOUNTER — APPOINTMENT (OUTPATIENT)
Dept: CT IMAGING | Age: 68
DRG: 139 | End: 2023-02-23
Payer: MEDICAID

## 2023-02-23 ENCOUNTER — APPOINTMENT (OUTPATIENT)
Dept: GENERAL RADIOLOGY | Age: 68
DRG: 139 | End: 2023-02-23
Payer: MEDICAID

## 2023-02-23 DIAGNOSIS — J96.01 ACUTE HYPOXEMIC RESPIRATORY FAILURE (HCC): ICD-10-CM

## 2023-02-23 DIAGNOSIS — J44.1 COPD EXACERBATION (HCC): Primary | ICD-10-CM

## 2023-02-23 DIAGNOSIS — J18.9 PNEUMONIA OF LOWER LOBE DUE TO INFECTIOUS ORGANISM, UNSPECIFIED LATERALITY: ICD-10-CM

## 2023-02-23 DIAGNOSIS — E87.1 HYPONATREMIA: ICD-10-CM

## 2023-02-23 LAB
ANION GAP SERPL CALC-SCNC: 15 MEQ/L (ref 8–16)
BASOPHILS ABSOLUTE: 0 THOU/MM3 (ref 0–0.1)
BASOPHILS NFR BLD AUTO: 0.3 %
BUN SERPL-MCNC: 7 MG/DL (ref 7–22)
CALCIUM SERPL-MCNC: 9.1 MG/DL (ref 8.5–10.5)
CHLORIDE SERPL-SCNC: 84 MEQ/L (ref 98–111)
CO2 SERPL-SCNC: 22 MEQ/L (ref 23–33)
CREAT SERPL-MCNC: 0.3 MG/DL (ref 0.4–1.2)
CRP SERPL-MCNC: 18.18 MG/DL (ref 0–1)
D DIMER PPP IA.FEU-MCNC: 1069 NG/ML FEU (ref 0–500)
DEPRECATED RDW RBC AUTO: 42.3 FL (ref 35–45)
EOSINOPHIL NFR BLD AUTO: 0.1 %
EOSINOPHILS ABSOLUTE: 0 THOU/MM3 (ref 0–0.4)
ERYTHROCYTE [DISTWIDTH] IN BLOOD BY AUTOMATED COUNT: 11.6 % (ref 11.5–14.5)
ERYTHROCYTE [SEDIMENTATION RATE] IN BLOOD BY WESTERGREN METHOD: 128 MM/HR (ref 0–20)
ETHANOL SERPL-MCNC: < 0.01 %
FOLATE SERPL-MCNC: 13.3 NG/ML (ref 4.8–24.2)
GFR SERPL CREATININE-BSD FRML MDRD: > 60 ML/MIN/1.73M2
GLUCOSE SERPL-MCNC: 157 MG/DL (ref 70–108)
HCT VFR BLD AUTO: 33 % (ref 37–47)
HGB BLD-MCNC: 11.9 GM/DL (ref 12–16)
IMM GRANULOCYTES # BLD AUTO: 0.28 THOU/MM3 (ref 0–0.07)
IMM GRANULOCYTES NFR BLD AUTO: 2 %
LACTATE SERPL-SCNC: 1.5 MMOL/L (ref 0.5–2)
LV EF: 65 %
LVEF MODALITY: NORMAL
LYMPHOCYTES ABSOLUTE: 1.5 THOU/MM3 (ref 1–4.8)
LYMPHOCYTES NFR BLD AUTO: 11 %
MAGNESIUM SERPL-MCNC: 1.9 MG/DL (ref 1.6–2.4)
MCH RBC QN AUTO: 36.3 PG (ref 26–33)
MCHC RBC AUTO-ENTMCNC: 36.1 GM/DL (ref 32.2–35.5)
MCV RBC AUTO: 100.6 FL (ref 81–99)
MONOCYTES ABSOLUTE: 1.3 THOU/MM3 (ref 0.4–1.3)
MONOCYTES NFR BLD AUTO: 9.4 %
NEUTROPHILS NFR BLD AUTO: 77.2 %
NRBC BLD AUTO-RTO: 0 /100 WBC
NT-PROBNP SERPL IA-MCNC: 945.5 PG/ML (ref 0–124)
OSMOLALITY SERPL CALC.SUM OF ELEC: 245.3 MOSMOL/KG (ref 275–300)
PLATELET # BLD AUTO: 474 THOU/MM3 (ref 130–400)
PLATELET BLD QL SMEAR: ADEQUATE
PMV BLD AUTO: 11.7 FL (ref 9.4–12.4)
POTASSIUM SERPL-SCNC: 3.3 MEQ/L (ref 3.5–5.2)
PROCALCITONIN SERPL IA-MCNC: 0.29 NG/ML (ref 0.01–0.09)
RBC # BLD AUTO: 3.28 MILL/MM3 (ref 4.2–5.4)
REASON FOR REJECTION: NORMAL
REJECTED TEST: NORMAL
SCAN OF BLOOD SMEAR: NORMAL
SEGMENTED NEUTROPHILS ABSOLUTE COUNT: 10.7 THOU/MM3 (ref 1.8–7.7)
SODIUM SERPL-SCNC: 121 MEQ/L (ref 135–145)
SODIUM SERPL-SCNC: 126 MEQ/L (ref 135–145)
SODIUM SERPL-SCNC: 126 MEQ/L (ref 135–145)
SODIUM SERPL-SCNC: 127 MEQ/L (ref 135–145)
SODIUM SERPL-SCNC: 132 MEQ/L (ref 135–145)
SODIUM SERPL-SCNC: 132 MEQ/L (ref 135–145)
T4 FREE SERPL-MCNC: 1.46 NG/DL (ref 0.93–1.76)
TOXIC GRANULES BLD QL SMEAR: PRESENT
TROPONIN T: < 0.01 NG/ML
TSH SERPL DL<=0.005 MIU/L-ACNC: 0.09 UIU/ML (ref 0.4–4.2)
VIT B12 SERPL-MCNC: 1515 PG/ML (ref 211–911)
WBC # BLD AUTO: 13.8 THOU/MM3 (ref 4.8–10.8)

## 2023-02-23 PROCEDURE — 93306 TTE W/DOPPLER COMPLETE: CPT

## 2023-02-23 PROCEDURE — 84145 PROCALCITONIN (PCT): CPT

## 2023-02-23 PROCEDURE — 86140 C-REACTIVE PROTEIN: CPT

## 2023-02-23 PROCEDURE — 83735 ASSAY OF MAGNESIUM: CPT

## 2023-02-23 PROCEDURE — 2700000000 HC OXYGEN THERAPY PER DAY

## 2023-02-23 PROCEDURE — 71275 CT ANGIOGRAPHY CHEST: CPT

## 2023-02-23 PROCEDURE — 87899 AGENT NOS ASSAY W/OPTIC: CPT

## 2023-02-23 PROCEDURE — 87449 NOS EACH ORGANISM AG IA: CPT

## 2023-02-23 PROCEDURE — 36415 COLL VENOUS BLD VENIPUNCTURE: CPT

## 2023-02-23 PROCEDURE — 2580000003 HC RX 258

## 2023-02-23 PROCEDURE — 83605 ASSAY OF LACTIC ACID: CPT

## 2023-02-23 PROCEDURE — 93010 ELECTROCARDIOGRAM REPORT: CPT | Performed by: NUCLEAR MEDICINE

## 2023-02-23 PROCEDURE — 87040 BLOOD CULTURE FOR BACTERIA: CPT

## 2023-02-23 PROCEDURE — 6370000000 HC RX 637 (ALT 250 FOR IP)

## 2023-02-23 PROCEDURE — 84295 ASSAY OF SERUM SODIUM: CPT

## 2023-02-23 PROCEDURE — 84484 ASSAY OF TROPONIN QUANT: CPT

## 2023-02-23 PROCEDURE — 85025 COMPLETE CBC W/AUTO DIFF WBC: CPT

## 2023-02-23 PROCEDURE — 2580000003 HC RX 258: Performed by: NURSE PRACTITIONER

## 2023-02-23 PROCEDURE — 6360000002 HC RX W HCPCS

## 2023-02-23 PROCEDURE — 96365 THER/PROPH/DIAG IV INF INIT: CPT

## 2023-02-23 PROCEDURE — 71045 X-RAY EXAM CHEST 1 VIEW: CPT

## 2023-02-23 PROCEDURE — 1200000003 HC TELEMETRY R&B

## 2023-02-23 PROCEDURE — 6360000002 HC RX W HCPCS: Performed by: NURSE PRACTITIONER

## 2023-02-23 PROCEDURE — 82746 ASSAY OF FOLIC ACID SERUM: CPT

## 2023-02-23 PROCEDURE — 80048 BASIC METABOLIC PNL TOTAL CA: CPT

## 2023-02-23 PROCEDURE — 99285 EMERGENCY DEPT VISIT HI MDM: CPT

## 2023-02-23 PROCEDURE — 84443 ASSAY THYROID STIM HORMONE: CPT

## 2023-02-23 PROCEDURE — 96367 TX/PROPH/DG ADDL SEQ IV INF: CPT

## 2023-02-23 PROCEDURE — 93005 ELECTROCARDIOGRAM TRACING: CPT

## 2023-02-23 PROCEDURE — 6370000000 HC RX 637 (ALT 250 FOR IP): Performed by: NURSE PRACTITIONER

## 2023-02-23 PROCEDURE — 94640 AIRWAY INHALATION TREATMENT: CPT

## 2023-02-23 PROCEDURE — 96360 HYDRATION IV INFUSION INIT: CPT

## 2023-02-23 PROCEDURE — 83880 ASSAY OF NATRIURETIC PEPTIDE: CPT

## 2023-02-23 PROCEDURE — 85651 RBC SED RATE NONAUTOMATED: CPT

## 2023-02-23 PROCEDURE — 99223 1ST HOSP IP/OBS HIGH 75: CPT

## 2023-02-23 PROCEDURE — 85379 FIBRIN DEGRADATION QUANT: CPT

## 2023-02-23 PROCEDURE — 6360000004 HC RX CONTRAST MEDICATION

## 2023-02-23 PROCEDURE — 84439 ASSAY OF FREE THYROXINE: CPT

## 2023-02-23 PROCEDURE — 82077 ASSAY SPEC XCP UR&BREATH IA: CPT

## 2023-02-23 PROCEDURE — 82607 VITAMIN B-12: CPT

## 2023-02-23 RX ORDER — POLYETHYLENE GLYCOL 3350 17 G/17G
17 POWDER, FOR SOLUTION ORAL DAILY PRN
Status: DISCONTINUED | OUTPATIENT
Start: 2023-02-23 | End: 2023-02-27 | Stop reason: HOSPADM

## 2023-02-23 RX ORDER — PANTOPRAZOLE SODIUM 40 MG/1
40 TABLET, DELAYED RELEASE ORAL
Status: DISCONTINUED | OUTPATIENT
Start: 2023-02-23 | End: 2023-02-27 | Stop reason: HOSPADM

## 2023-02-23 RX ORDER — POTASSIUM CHLORIDE 7.45 MG/ML
10 INJECTION INTRAVENOUS
Status: DISCONTINUED | OUTPATIENT
Start: 2023-02-23 | End: 2023-02-23

## 2023-02-23 RX ORDER — MULTIVITAMIN WITH IRON
1 TABLET ORAL DAILY
Status: DISCONTINUED | OUTPATIENT
Start: 2023-02-23 | End: 2023-02-27 | Stop reason: HOSPADM

## 2023-02-23 RX ORDER — MAGNESIUM SULFATE IN WATER 40 MG/ML
2000 INJECTION, SOLUTION INTRAVENOUS PRN
Status: DISCONTINUED | OUTPATIENT
Start: 2023-02-23 | End: 2023-02-27 | Stop reason: HOSPADM

## 2023-02-23 RX ORDER — SUCRALFATE 1 G/1
1 TABLET ORAL 4 TIMES DAILY
Status: DISCONTINUED | OUTPATIENT
Start: 2023-02-23 | End: 2023-02-27 | Stop reason: HOSPADM

## 2023-02-23 RX ORDER — POTASSIUM CHLORIDE 7.45 MG/ML
10 INJECTION INTRAVENOUS
Status: DISPENSED | OUTPATIENT
Start: 2023-02-23 | End: 2023-02-23

## 2023-02-23 RX ORDER — SODIUM CHLORIDE 0.9 % (FLUSH) 0.9 %
10 SYRINGE (ML) INJECTION EVERY 12 HOURS SCHEDULED
Status: DISCONTINUED | OUTPATIENT
Start: 2023-02-23 | End: 2023-02-27 | Stop reason: HOSPADM

## 2023-02-23 RX ORDER — METHYLPREDNISOLONE SODIUM SUCCINATE 125 MG/2ML
125 INJECTION, POWDER, LYOPHILIZED, FOR SOLUTION INTRAMUSCULAR; INTRAVENOUS ONCE
Status: DISCONTINUED | OUTPATIENT
Start: 2023-02-23 | End: 2023-02-27 | Stop reason: HOSPADM

## 2023-02-23 RX ORDER — ACETAMINOPHEN 325 MG/1
650 TABLET ORAL EVERY 6 HOURS PRN
Status: DISCONTINUED | OUTPATIENT
Start: 2023-02-23 | End: 2023-02-27 | Stop reason: HOSPADM

## 2023-02-23 RX ORDER — FOLIC ACID 1 MG/1
1 TABLET ORAL DAILY
Status: DISCONTINUED | OUTPATIENT
Start: 2023-02-23 | End: 2023-02-27 | Stop reason: HOSPADM

## 2023-02-23 RX ORDER — DEXTROSE, SODIUM CHLORIDE, SODIUM LACTATE, POTASSIUM CHLORIDE, AND CALCIUM CHLORIDE 5; .6; .31; .03; .02 G/100ML; G/100ML; G/100ML; G/100ML; G/100ML
INJECTION, SOLUTION INTRAVENOUS CONTINUOUS
Status: DISCONTINUED | OUTPATIENT
Start: 2023-02-23 | End: 2023-02-24

## 2023-02-23 RX ORDER — ALBUTEROL SULFATE 2.5 MG/3ML
2.5 SOLUTION RESPIRATORY (INHALATION) ONCE
Status: COMPLETED | OUTPATIENT
Start: 2023-02-23 | End: 2023-02-23

## 2023-02-23 RX ORDER — ONDANSETRON 2 MG/ML
4 INJECTION INTRAMUSCULAR; INTRAVENOUS EVERY 6 HOURS PRN
Status: DISCONTINUED | OUTPATIENT
Start: 2023-02-23 | End: 2023-02-27 | Stop reason: HOSPADM

## 2023-02-23 RX ORDER — ACETAMINOPHEN 325 MG/1
650 TABLET ORAL ONCE
Status: COMPLETED | OUTPATIENT
Start: 2023-02-23 | End: 2023-02-23

## 2023-02-23 RX ORDER — ENOXAPARIN SODIUM 100 MG/ML
30 INJECTION SUBCUTANEOUS DAILY
Status: DISCONTINUED | OUTPATIENT
Start: 2023-02-23 | End: 2023-02-24

## 2023-02-23 RX ORDER — IPRATROPIUM BROMIDE AND ALBUTEROL SULFATE 2.5; .5 MG/3ML; MG/3ML
1 SOLUTION RESPIRATORY (INHALATION) EVERY 4 HOURS PRN
Status: DISCONTINUED | OUTPATIENT
Start: 2023-02-23 | End: 2023-02-23 | Stop reason: RX

## 2023-02-23 RX ORDER — DEXTROSE MONOHYDRATE 50 MG/ML
INJECTION, SOLUTION INTRAVENOUS CONTINUOUS
Status: ACTIVE | OUTPATIENT
Start: 2023-02-23 | End: 2023-02-23

## 2023-02-23 RX ORDER — PHENOBARBITAL SODIUM 65 MG/ML
130 INJECTION INTRAMUSCULAR
Status: DISCONTINUED | OUTPATIENT
Start: 2023-02-23 | End: 2023-02-27 | Stop reason: HOSPADM

## 2023-02-23 RX ORDER — POTASSIUM CHLORIDE 7.45 MG/ML
10 INJECTION INTRAVENOUS PRN
Status: DISCONTINUED | OUTPATIENT
Start: 2023-02-23 | End: 2023-02-27 | Stop reason: HOSPADM

## 2023-02-23 RX ORDER — DESMOPRESSIN ACETATE 4 UG/ML
3 INJECTION, SOLUTION INTRAVENOUS; SUBCUTANEOUS ONCE
Status: COMPLETED | OUTPATIENT
Start: 2023-02-23 | End: 2023-02-23

## 2023-02-23 RX ORDER — ONDANSETRON 4 MG/1
4 TABLET, ORALLY DISINTEGRATING ORAL EVERY 8 HOURS PRN
Status: DISCONTINUED | OUTPATIENT
Start: 2023-02-23 | End: 2023-02-27 | Stop reason: HOSPADM

## 2023-02-23 RX ORDER — SODIUM CHLORIDE 9 MG/ML
INJECTION, SOLUTION INTRAVENOUS PRN
Status: DISCONTINUED | OUTPATIENT
Start: 2023-02-23 | End: 2023-02-27 | Stop reason: HOSPADM

## 2023-02-23 RX ORDER — POTASSIUM CHLORIDE 20 MEQ/1
40 TABLET, EXTENDED RELEASE ORAL PRN
Status: DISCONTINUED | OUTPATIENT
Start: 2023-02-23 | End: 2023-02-27 | Stop reason: HOSPADM

## 2023-02-23 RX ORDER — LANOLIN ALCOHOL/MO/W.PET/CERES
100 CREAM (GRAM) TOPICAL DAILY
Status: DISCONTINUED | OUTPATIENT
Start: 2023-02-23 | End: 2023-02-27 | Stop reason: HOSPADM

## 2023-02-23 RX ORDER — PREDNISONE 20 MG/1
40 TABLET ORAL DAILY
Status: DISCONTINUED | OUTPATIENT
Start: 2023-02-24 | End: 2023-02-27 | Stop reason: HOSPADM

## 2023-02-23 RX ORDER — PHENOBARBITAL SODIUM 65 MG/ML
260 INJECTION INTRAMUSCULAR
Status: DISCONTINUED | OUTPATIENT
Start: 2023-02-23 | End: 2023-02-27 | Stop reason: HOSPADM

## 2023-02-23 RX ORDER — SODIUM CHLORIDE 0.9 % (FLUSH) 0.9 %
10 SYRINGE (ML) INJECTION PRN
Status: DISCONTINUED | OUTPATIENT
Start: 2023-02-23 | End: 2023-02-27 | Stop reason: HOSPADM

## 2023-02-23 RX ORDER — ACETAMINOPHEN 650 MG/1
650 SUPPOSITORY RECTAL EVERY 6 HOURS PRN
Status: DISCONTINUED | OUTPATIENT
Start: 2023-02-23 | End: 2023-02-27 | Stop reason: HOSPADM

## 2023-02-23 RX ORDER — 0.9 % SODIUM CHLORIDE 0.9 %
1000 INTRAVENOUS SOLUTION INTRAVENOUS ONCE
Status: COMPLETED | OUTPATIENT
Start: 2023-02-23 | End: 2023-02-23

## 2023-02-23 RX ADMIN — SUCRALFATE 1 G: 1 TABLET ORAL at 21:19

## 2023-02-23 RX ADMIN — SUCRALFATE 1 G: 1 TABLET ORAL at 10:34

## 2023-02-23 RX ADMIN — DEXTROSE MONOHYDRATE: 50 INJECTION, SOLUTION INTRAVENOUS at 10:53

## 2023-02-23 RX ADMIN — CEFTRIAXONE SODIUM 1000 MG: 1 INJECTION, POWDER, FOR SOLUTION INTRAMUSCULAR; INTRAVENOUS at 04:05

## 2023-02-23 RX ADMIN — SODIUM CHLORIDE, SODIUM LACTATE, POTASSIUM CHLORIDE, CALCIUM CHLORIDE AND DEXTROSE MONOHYDRATE 500 ML: 5; 600; 310; 30; 20 INJECTION, SOLUTION INTRAVENOUS at 15:32

## 2023-02-23 RX ADMIN — SODIUM CHLORIDE 1000 ML: 9 INJECTION, SOLUTION INTRAVENOUS at 02:18

## 2023-02-23 RX ADMIN — Medication 100 MG: at 10:34

## 2023-02-23 RX ADMIN — IOPAMIDOL 80 ML: 755 INJECTION, SOLUTION INTRAVENOUS at 15:10

## 2023-02-23 RX ADMIN — AZITHROMYCIN MONOHYDRATE 500 MG: 500 INJECTION, POWDER, LYOPHILIZED, FOR SOLUTION INTRAVENOUS at 04:36

## 2023-02-23 RX ADMIN — POTASSIUM CHLORIDE 40 MEQ: 1500 TABLET, EXTENDED RELEASE ORAL at 10:34

## 2023-02-23 RX ADMIN — IPRATROPIUM BROMIDE 0.5 MG: 0.5 SOLUTION RESPIRATORY (INHALATION) at 13:25

## 2023-02-23 RX ADMIN — ALBUTEROL SULFATE 2.5 MG: 2.5 SOLUTION RESPIRATORY (INHALATION) at 21:54

## 2023-02-23 RX ADMIN — ACETAMINOPHEN 650 MG: 325 TABLET ORAL at 03:24

## 2023-02-23 RX ADMIN — PHENOBARBITAL SODIUM 260 MG: 65 INJECTION INTRAMUSCULAR at 21:28

## 2023-02-23 RX ADMIN — SUCRALFATE 1 G: 1 TABLET ORAL at 16:14

## 2023-02-23 RX ADMIN — SODIUM CHLORIDE, PRESERVATIVE FREE 10 ML: 5 INJECTION INTRAVENOUS at 21:23

## 2023-02-23 RX ADMIN — ACETAMINOPHEN 650 MG: 325 TABLET ORAL at 11:54

## 2023-02-23 RX ADMIN — DESMOPRESSIN ACETATE 3 MCG: 4 SOLUTION INTRAVENOUS at 21:21

## 2023-02-23 RX ADMIN — IPRATROPIUM BROMIDE 0.5 MG: 0.5 SOLUTION RESPIRATORY (INHALATION) at 02:14

## 2023-02-23 RX ADMIN — ENOXAPARIN SODIUM 30 MG: 100 INJECTION SUBCUTANEOUS at 10:34

## 2023-02-23 RX ADMIN — Medication 1 TABLET: at 10:34

## 2023-02-23 RX ADMIN — ACETAMINOPHEN 650 MG: 325 TABLET ORAL at 18:03

## 2023-02-23 RX ADMIN — SODIUM CHLORIDE, PRESERVATIVE FREE 10 ML: 5 INJECTION INTRAVENOUS at 10:34

## 2023-02-23 RX ADMIN — SODIUM CHLORIDE, SODIUM LACTATE, POTASSIUM CHLORIDE, CALCIUM CHLORIDE AND DEXTROSE MONOHYDRATE 750 ML: 5; 600; 310; 30; 20 INJECTION, SOLUTION INTRAVENOUS at 20:19

## 2023-02-23 RX ADMIN — SODIUM CHLORIDE, SODIUM LACTATE, POTASSIUM CHLORIDE, CALCIUM CHLORIDE AND DEXTROSE MONOHYDRATE 250 ML: 5; 600; 310; 30; 20 INJECTION, SOLUTION INTRAVENOUS at 21:14

## 2023-02-23 RX ADMIN — ALBUTEROL SULFATE 2.5 MG: 2.5 SOLUTION RESPIRATORY (INHALATION) at 13:25

## 2023-02-23 RX ADMIN — PANTOPRAZOLE SODIUM 40 MG: 40 TABLET, DELAYED RELEASE ORAL at 10:34

## 2023-02-23 RX ADMIN — ALBUTEROL SULFATE 2.5 MG: 2.5 SOLUTION RESPIRATORY (INHALATION) at 02:14

## 2023-02-23 RX ADMIN — FOLIC ACID 1 MG: 1 TABLET ORAL at 10:34

## 2023-02-23 RX ADMIN — IPRATROPIUM BROMIDE 0.5 MG: 0.5 SOLUTION RESPIRATORY (INHALATION) at 21:54

## 2023-02-23 ASSESSMENT — PAIN DESCRIPTION - LOCATION
LOCATION: HEAD

## 2023-02-23 ASSESSMENT — ENCOUNTER SYMPTOMS
CONSTIPATION: 0
COUGH: 1
RHINORRHEA: 0
SHORTNESS OF BREATH: 1
VOMITING: 0
SORE THROAT: 0
NAUSEA: 0
ABDOMINAL PAIN: 0
DIARRHEA: 0

## 2023-02-23 ASSESSMENT — LIFESTYLE VARIABLES
HOW OFTEN DO YOU HAVE A DRINK CONTAINING ALCOHOL: 4 OR MORE TIMES A WEEK
HOW MANY STANDARD DRINKS CONTAINING ALCOHOL DO YOU HAVE ON A TYPICAL DAY: 3 OR 4

## 2023-02-23 ASSESSMENT — PAIN SCALES - GENERAL
PAINLEVEL_OUTOF10: 9
PAINLEVEL_OUTOF10: 8
PAINLEVEL_OUTOF10: 3
PAINLEVEL_OUTOF10: 6

## 2023-02-23 ASSESSMENT — PAIN DESCRIPTION - DESCRIPTORS
DESCRIPTORS: ACHING
DESCRIPTORS: ACHING

## 2023-02-23 ASSESSMENT — PAIN - FUNCTIONAL ASSESSMENT: PAIN_FUNCTIONAL_ASSESSMENT: NONE - DENIES PAIN

## 2023-02-23 NOTE — ED NOTES
Pt assisted up to beside commode and tolerated well. Denies further needs at this time. Call light within reach.      Aldair Verduzco RN  02/23/23 2751

## 2023-02-23 NOTE — ED TRIAGE NOTES
Pt presents to ED via EMS with chief complaint of shortness of breath that has been worsening over the past 2 days. Pt reports history of COPD. Coughing up clear phlegm. Pt's O2 83% on room air; placed on 3L NC. EMS administered 125mg solumedrol and 1 duoneb en route. Pt expresses some improvement in breathing upon arrival. Denies pain.

## 2023-02-23 NOTE — H&P
Hospitalist - History & Physical      Patient: Chaim Rodriguez    Unit/Bed:05/005A  YOB: 1955  MRN: [id number]   Acct: [de-identified]   PCP: Jareth Sánchez DO    Date of Service: Pt seen/examined on 02/23/23  and Admitted to Inpatient with expected LOS greater than two midnights due to medical therapy. Chief Complaint:  shortness of breath    Assessment and Plan:  Acute hypoxic respiratory failure, secondary to COPD exacerbation and PNA:    Pt presents hypoxic at baseline of RA- now on 3L O2 NC, afebrile, tachycardic, otherwise hemodynamically stable. Endorsing worsening shortness of breath x 2-3 days. Labs show WBC 13.8 with bandemia, procal 0.29, BNP slightly elevated. CXR shows possible patchy opacities in LML, LLL; Questionable mild pulmonary edema noted. BC x 2 obtained. Given Azithromycin, Rocephin, Solumedrol, atrovent neb and albuterol in ED. Continue Prednisone 40mg x 5 days, Rocephin, Azithromycin; wean O2 as tolerated. Respiratory culture, respiratory viral antigen panel ordered. ECHO ordered. Hypo-osmolar, Hyponatremia:    Na 121 today. Appears asymptomatic at this time. Given 1L NS bolus in ED, repeat Na 126. Suspect secondary to chronic alcohol use. Pt appears euvolemic today. Urine electrolytes, urine osmolality ordered. Will continue to monitor Q4H. Due to Na correction of 121 to 126 today, will start D5W gentle maintenance fluids at this time. Sinus tachycardia:     Unclear etiology at this time. Considering secondary to dehydration, alcohol withdrawal, acute infection; unable to R/O PE at this time. EKG shows mild ST elevation in V4-V6, very mild ME depression in inferior leads. To note, mild improvement after 1L NS bolus given in ED. Ddimer ordered. Repeat Trop, EKG, telemetry. Monitor closely. Start gentle D5 infusion. Hypokalemia:    K 3.3 today. 30 mEq given in ED. Will recheck later this afternoon. Potassium replacement protocol ordered upon admission.  Monitor with daily BMP. Alcohol abuse:    Pt reports drinking beer 3-4 times a week, last drink last night; exact amount of alcohol in one setting unclear. Suspect contributing to #2. Start multivitamin, folate, thiamine supplementation. PAWSS calculated to be 1. Fall risk. Phenobarbital IV push panel ordered. Monitor symptoms closely. Chronic macrocytic anemia:    Suspect secondary to alcohol abuse. Hgb stable- no signs of bleeding at this time. Vit B12 and folate ordered. Multivitamin, thiamine, folate started. Hx of discoid lupus:     Pt reports no longer taking plaquenil due to not establishing care with a new provider after she moved. Recommend assisting patient in setting up appointment with family doctor and/or rheumatologist upon discharge. Case discussed with Dr. Judy Nicholson, Hospitalist.       History Of Present Illness:    Bindu Salcedo is a 75 y/o  female smoker with a PMHx of COPD, alcohol abuse who presents to [facility] ED today for the evaluation of shortness of breath x 2 days. Pt reports her shortness of breath started 2-3 days ago with activity, but has been progressively worsening since and now experiences significant shortness of breath at rest. Her symptoms do eventually improve with prolonged rest, worse with minimal exertion. She reports associated productive cough of white sputum. She denies associated chest pain, fever, chills, lightheadedness, dizziness. Pt reports smoking 3-4 ppd and drinks alcohol 3-4 days out of the week; her choice of alcohol is beer. She otherwise denies recent ill contact, recent illness or recent change in medication, sore throat, runny nose, sinus congestion, abdominal pain or change in bowel or urinary habits. Past Medical History:        Diagnosis Date    Colonoscopy refused     could not afford it. Discoid lupus 12/17/2013    Seen Dr. Saintclair Pillar. Bx discoid lupus. Under treatment. KAITLIN normal. Lesion on scalp.  Patient on Plaquenil and steroid cream Lump in chest     Lt side of the chest 5 x 5 cm cyst    Pneumonia     Tobacco abuse disorder        Past Surgical History:        Procedure Laterality Date     SECTION      OVARY REMOVAL      left    UPPER GASTROINTESTINAL ENDOSCOPY N/A 2021    EGD BIOPSY performed by Lauren Stover MD at [place] [address] N/A 2021    EGD BIOPSY performed by Lauren Stover MD at [facility]       Home Medications:   No current facility-administered medications on file prior to encounter. Current Outpatient Medications on File Prior to Encounter   Medication Sig Dispense Refill    sucralfate (CARAFATE) 1 GM tablet Take 1 g by mouth 4 times daily      pantoprazole (PROTONIX) 40 MG tablet Take 1 tablet by mouth every morning (before breakfast) 90 tablet 0    acetaminophen (TYLENOL) 500 MG tablet Take 500 mg by mouth every 6 hours as needed for Pain or Fever      hydroxychloroquine (PLAQUENIL) 200 MG tablet Take 1 tablet by mouth 2 times daily  (Patient not taking: Reported on 9/10/2021)         Allergies:    Patient has no known allergies. Social History:    reports that she has been smoking cigarettes. She started smoking about 50 years ago. She has a 10.00 pack-year smoking history. She has never used smokeless tobacco. She reports current alcohol use of about 3.0 standard drinks per week. She reports that she does not use drugs. Family History:       Problem Relation Age of Onset    Substance Abuse Father     Diabetes Mother        Diet:  No diet orders on file    Review of systems:     Review of Systems   Constitutional:  Positive for activity change. Negative for appetite change, chills, fatigue and fever. HENT:  Negative for congestion, rhinorrhea and sore throat. Eyes:  Negative for visual disturbance. Respiratory:  Positive for cough and shortness of breath. Cardiovascular:  Negative for chest pain, palpitations and leg swelling.   Gastrointestinal:  Negative for abdominal pain, constipation, diarrhea, nausea and vomiting.   Genitourinary:  Negative for difficulty urinating, frequency and urgency.   Musculoskeletal:  Negative for arthralgias and myalgias.   Neurological:  Negative for dizziness, weakness and light-headedness.   Psychiatric/Behavioral:  Negative for confusion. The patient is not nervous/anxious.       PHYSICAL EXAM:  /69   Pulse (!) 119   Temp 97.7 °F (36.5 °C) (Oral)   Resp 25   Ht 5' 3\" (1.6 m)   Wt 110 lb (49.9 kg)   SpO2 96%   BMI 19.49 kg/m²   General appearance: Chronically ill appearing. No apparent distress. Appears stated age and cooperative.  Skin: Skin color, texture, turgor normal.  No rashes or lesions.  HEENT: Normal cephalic, atraumatic without obvious deformity. Pupils equal, round, and reactive to light.  Extra-ocular muscles intact. Conjunctivae/corneas clear.  Neck: Trachea midline. Supple, with full range of motion.   Cardiovascular: Regular rate and rhythm with normal S1/S2. No murmurs, rubs or gallops.  Respiratory: On 3L O2 NC. Increased respiratory effort. Faint inspiratory wheezing heart in upper and middle lung fields bilaterally.  Abdomen: Soft, non-tender, non-distended. Normal bowel sounds.   Musculoskeletal:  No weakness or instability noted.  No edema, erythema, or gross deformity noted.   Vascular:  Pulses +2 palpable, equal bilaterally.  Neurologic: Hard of hearing. Neurovascularly intact without any focal sensory/motor deficits. Cranial nerves: II-XII grossly intact.   Psychiatric: Alert and oriented, thought content appropriate, normal insight      Labs:   Recent Labs     02/23/23  0127   WBC 13.8*   HGB 11.9*   HCT 33.0*   *     Recent Labs     02/23/23  0230   *   K 3.3*   CL 84*   CO2 22*   BUN 7   CREATININE 0.3*   CALCIUM 9.1     No results for input(s): AST, ALT, BILIDIR, BILITOT, ALKPHOS in the last 72 hours.  No results for input(s): INR in the last 72  hours. No results for input(s): Beba Guzman in the last 72 hours. Urinalysis:    Lab Results   Component Value Date/Time    NITRU NEGATIVE 08/30/2021 08:46 PM    WBCUA 0-2 08/30/2021 08:46 PM    BACTERIA NONE SEEN 08/30/2021 08:46 PM    RBCUA 5-10 08/30/2021 08:46 PM    BLOODU MODERATE 08/30/2021 08:46 PM    SPECGRAV 1.010 10/01/2015 03:15 PM    GLUCOSEU NEGATIVE 08/30/2021 08:46 PM       Radiology:   XR CHEST PORTABLE   Final Result   Impression:   Patchy opacities in the left mid and lower lung zones could be secondary    to developing multifocal pneumonia or aspiration. Questionable interstitial prominence. Concurrent mild edema may also be    considered. This document has been electronically signed by: Crow Sylvester. Husam Carmen MD    on 02/23/2023 02:17 AM        XR CHEST PORTABLE    Result Date: 2/23/2023  Chest Radiograph Comparison: 8/30/21. CT from 4/8/18 Findings: No cardiomegaly. Normal mediastinal contours. No pneumothorax. Question interstitial prominence. Patchy opacities in the left mid and lower lung zones. No pleural effusion. Normal upper abdomen. No fracture. Impression: Patchy opacities in the left mid and lower lung zones could be secondary to developing multifocal pneumonia or aspiration. Questionable interstitial prominence. Concurrent mild edema may also be considered. This document has been electronically signed by: Crow Sylvester. Husam Carmen MD on 02/23/2023 02:17 AM        EKG:  Sinus tachycardia  bpm; Repeat EKG ordered upon admission- tachycardia improving.     Electronically signed by Rosalia Harrell PA-C on 2/23/2023 at 6:21 AM

## 2023-02-23 NOTE — ED NOTES
Patient resting in bed. Denies further needs at this time. Call light within reach.        Nani Collins RN  02/23/23 2521

## 2023-02-23 NOTE — ED PROVIDER NOTES
[facility] [address] [address] EMERGENCY DEPT      EMERGENCY MEDICINE     Pt Name: Daria Rosas  MRN: [id number]  Georgegfhudson 1955  Date of evaluation: 2023  Provider: ADRIEN Bowens CNP    CHIEF COMPLAINT       Chief Complaint   Patient presents with    Shortness of Breath     HISTORY OF [address] [facility] Martin is a pleasant 74 y.o. female who presents to the emergency department from home with c/o's shortness of breath. Patient states she has a history of COPD and has been getting worsening shortness of breath. She does not have any inhalers or breathing treatments at home. She states she has lupus and supposed to be on Plaquenil but she has not been able to get into the dermatologist so she is out of that as well. Denies chest pain. No fever. She is not coughing anything up at this time. 83% on room air. Satting well on 4 L nasal cannula      History is obtained from:  patient  PASTMEDICAL HISTORY     Past Medical History:   Diagnosis Date    Colonoscopy refused     could not afford it. Discoid lupus 2013    Seen Dr. Jasmina Klein. Bx discoid lupus. Under treatment. KAITLIN normal. Lesion on scalp.  Patient on Plaquenil and steroid cream     Lump in chest     Lt side of the chest 5 x 5 cm cyst    Pneumonia     Tobacco abuse disorder        Patient Active Problem List   Diagnosis Code    Tobacco abuse disorder Z72.0    Discoid lupus L93.0    Hematuria R31.9    Weight loss R63.4    Cyst of right ovary N83.201    COPD with exacerbation ([address] J44.1    Mediastinal lymphadenopathy R59.0    Pneumonitis J18.9    Hyponatremia E87.1    Thyroid nodules - needs outpatient thyroid US for further evaluation E04.1    Interstitial pneumonia ([address] J84.9    Pneumonia J18.9    Gastric ulcer with hemorrhage K25.4    Severe malnutrition ([address] E43    Acute hypoxemic respiratory failure ([address] J96.01     SURGICAL HISTORY       Past Surgical History:   Procedure Laterality Date     SECTION      OVARY REMOVAL  1999    left    UPPER GASTROINTESTINAL ENDOSCOPY N/A 2021    EGD BIOPSY performed by Emmy Sauceda MD at [address] [facility] 2021    EGD BIOPSY performed by Emmy Sauceda MD at [address] [facility]       Previous Medications    ACETAMINOPHEN (TYLENOL) 500 MG TABLET    Take 500 mg by mouth every 6 hours as needed for Pain or Fever    HYDROXYCHLOROQUINE (PLAQUENIL) 200 MG TABLET    Take 1 tablet by mouth 2 times daily     PANTOPRAZOLE (PROTONIX) 40 MG TABLET    Take 1 tablet by mouth every morning (before breakfast)    SUCRALFATE (CARAFATE) 1 GM TABLET    Take 1 g by mouth 4 times daily       ALLERGIES     has No Known Allergies. FAMILY HISTORY     She indicated that her mother is . She indicated that her father is . She indicated that all of her three sisters are alive. She indicated that only one of her three brothers is alive. SOCIAL HISTORY       Social History     Tobacco Use    Smoking status: Every Day     Packs/day: 0.50     Years: 20.00     Pack years: 10.00     Types: Cigarettes     Start date:     Smokeless tobacco: Never   Substance Use Topics    Alcohol use: Yes     Alcohol/week: 3.0 standard drinks     Types: 3 Cans of beer per week     Comment: pt states she drinks around 8/9pm and drinks 2-3 beers    Drug use: No       PHYSICAL EXAM       ED Triage Vitals [23 0117]   BP Temp Temp Source Heart Rate Resp SpO2 Height Weight   112/70 97.7 °F (36.5 °C) Oral (!) 127 27 (!) 83 % 5' 3\" (1.6 m) 110 lb (49.9 kg)       Physical Exam  Vitals and nursing note reviewed. Constitutional:       General: She is not in acute distress. Appearance: She is well-developed. She is not diaphoretic. HENT:      Head: Normocephalic and atraumatic. Nose: Nose normal.      Mouth/Throat:      Mouth: Mucous membranes are moist.      Pharynx: Oropharynx is clear.    Eyes:      General:         Right eye: No discharge. Left eye: No discharge. Conjunctiva/sclera: Conjunctivae normal.   Neck:      Trachea: No tracheal deviation. Cardiovascular:      Rate and Rhythm: Normal rate and regular rhythm. Pulses: Normal pulses. Heart sounds: Normal heart sounds. No murmur heard. No gallop. Comments: Normal capillary refill  Pulmonary:      Effort: Tachypnea and accessory muscle usage present. No respiratory distress. Breath sounds: No stridor. Decreased breath sounds, wheezing and rhonchi present. Abdominal:      General: Bowel sounds are normal.      Palpations: Abdomen is soft. Musculoskeletal:         General: No tenderness or deformity. Normal range of motion. Cervical back: Normal range of motion. Skin:     General: Skin is warm and dry. Capillary Refill: Capillary refill takes less than 2 seconds. Coloration: Skin is not pale. Findings: No erythema or rash. Neurological:      General: No focal deficit present. Mental Status: She is alert and oriented to person, place, and time. Cranial Nerves: No cranial nerve deficit. Psychiatric:         Mood and Affect: Mood normal.         Behavior: Behavior normal.       FORMAL DIAGNOSTIC RESULTS     RADIOLOGY: Interpretation per the Radiologist below, if available at the time of this note (none if blank):    XR CHEST PORTABLE   Final Result   Impression:   Patchy opacities in the left mid and lower lung zones could be secondary    to developing multifocal pneumonia or aspiration. Questionable interstitial prominence. Concurrent mild edema may also be    considered. This document has been electronically signed by: [place] .  Yakelin Moralez MD    on 02/23/2023 02:17 AM          LABS: (none if blank)  Labs Reviewed   CBC WITH AUTO DIFFERENTIAL - Abnormal; Notable for the following components:       Result Value    WBC 13.8 (*)     RBC 3.28 (*)     Hemoglobin 11.9 (*)     Hematocrit 33.0 (*)     .6 (*)     MCH 36.3 (*)     MCHC 36.1 (*)     Platelets 243 (*)     Segs Absolute 10.7 (*)     Immature Grans (Abs) 0.28 (*)     All other components within normal limits   BASIC METABOLIC PANEL W/ REFLEX TO MG FOR LOW K - Abnormal; Notable for the following components:    Sodium 121 (*)     Potassium reflex Magnesium 3.3 (*)     Chloride 84 (*)     CO2 22 (*)     Glucose 157 (*)     Creatinine 0.3 (*)     All other components within normal limits   BRAIN NATRIURETIC PEPTIDE - Abnormal; Notable for the following components:    Pro-.5 (*)     All other components within normal limits   PROCALCITONIN - Abnormal; Notable for the following components:    Procalcitonin 0.29 (*)     All other components within normal limits   OSMOLALITY - Abnormal; Notable for the following components:    Osmolality Calc 245.3 (*)     All other components within normal limits   CULTURE, BLOOD 1   CULTURE, BLOOD 2   LEGIONELLA ANTIGEN, URINE   STREP PNEUMONIAE ANTIGEN   ETHANOL   SPECIMEN REJECTION   TROPONIN   LACTIC ACID   SCAN OF BLOOD SMEAR   ANION GAP   GLOMERULAR FILTRATION RATE, ESTIMATED   MAGNESIUM   SODIUM       (Any cultures that may have been sent were not resulted at the time of this patient visit)    [facility] / ED COURSE:     Summary of Patient Presentation:      1) Number and Complexity of Problems            Problem List This Visit:         Chief Complaint   Patient presents with    Shortness of Breath             Differential Diagnosis includes (but not limited to):  flu, strep, PNA, bronchitis, viral illness               2)  Data Reviewed (none if left blank, additional information can be found in the ED course)          My Independent interpretations:     EKG:      Sinus tachycardia    Imaging: Left mid and lower lobe pneumonia    Labs:      WBC 13.8 Pro-Sam 0.29. Sodium 121. Patient does drink daily however EtOH level is negative. She has pneumonia therefore this is concerning for Legionella. Decision Rules/Clinical Scores utilized:                           External Documentation Reviewed:         Previous patient encounter documents & history available on EMR was reviewed              See Formal Diagnostic Results above for the lab and radiology tests and orders. 3)  Treatment and Disposition         ED Reassessment: Stable, improved after breathing treatment steroids         Case discussed with consulting clinician/attending physician: Nae Robert         Shared Decision-Making was performed and disposition discussed with the       Patient/Family and questions answered          Social determinants of health impacting treatment or disposition:   No or limited follow-up         Code Status:  Full        MDM    Vitals Reviewed:    Vitals:    02/23/23 0330 02/23/23 0430 02/23/23 0530 02/23/23 0625   BP: 126/71 110/64 115/69 103/61   Pulse: (!) 129 (!) 121 (!) 119 (!) 110   Resp: 27 26 25 19   Temp:       TempSrc:       SpO2: 92% 94% 96% 97%   Weight:       Height:           The patient was seen and examined. Appropriate diagnostic testing was performed and results reviewed with the patient. Are concerning for an elevated white count and a hyponatremia. BNP is slightly elevated but at this point I am unsure of the clinical significance. Patient could have some early failure. Patient's chest x-ray shows a left-sided pneumonia. She is tachycardic and hypoxic. She is improved on oxygen but remains tachycardic. She is hydrated with some IV fluids but we did so gingerly due to hyponatremia. Legionella antigen is ordered. This could also be beer P[place] lauren as the patient drinks daily. I consulted the hospitalist who agreed to admit. Patient is updated. I did start IV antibiotics. Is admitted in stable condition      The results of pertinent diagnostic studies and exam findings were discussed.  The patients provisional diagnosis and plan of care were discussed with the patient and present family who expressed understanding and agreement with the POC. Any medications were reviewed and indications and risks of medications were discussed with the patient /family present. Strict verbal and written return precautions, instructions and appropriate follow-up provided to  the patient. Patient was DISCHARGED from the hospital. Based on the reassuring ED workup and patient's stable vital signs, I feel the patient may be safely discharged home. At this point in time, I believe the patient has the mental capacity to make medical decisions. No notes of EC Admission Criteria type on file. Please note that the patient was evaluated during a pandemic. All efforts were made for HIPPA compliance as well as provision of appropriate care. Patient was seen independently by myself. The patient's final impression and disposition and plan was determined by myself. Strict return precautions and follow up instructions were discussed with the patient prior to discharge, with which the patient agrees. Physical assessment findings, diagnostic testing(s) if applicable, and vital signs reviewed with patient/patient representative. Questions answered. Medications asdirected, including OTC medications for supportive care. Education provided on medications. Differential diagnosis(s) discussed with patient/patient representative. Home care/self care instructions reviewed withpatient/patient representative. Patient is to follow-up with family care provider in 2-3 days if no improvement. Patient is to go to the emergency department if symptoms worsen. Patient/patient representative isaware of care plan, questions answered, verbalizes understanding and is in agreement.      ED Medications administered this visit:  (None if blank)  Medications   methylPREDNISolone sodium (SOLU-MEDROL) injection 125 mg (125 mg IntraVENous Not Given 2/23/23 0218)   potassium chloride 10 mEq/100 mL IVPB (Peripheral Line) (has no administration in time range)   albuterol (PROVENTIL) nebulizer solution 2.5 mg (2.5 mg Nebulization Given 2/23/23 0214)   ipratropium (ATROVENT) 0.02 % nebulizer solution 0.5 mg (0.5 mg Nebulization Given 2/23/23 0214)   0.9 % sodium chloride bolus (0 mLs IntraVENous Stopped 2/23/23 0330)   acetaminophen (TYLENOL) tablet 650 mg (650 mg Oral Given 2/23/23 0324)   cefTRIAXone (ROCEPHIN) 1,000 mg in sodium chloride 0.9 % 50 mL IVPB (mini-bag) (0 mg IntraVENous Stopped 2/23/23 0435)   azithromycin (ZITHROMAX) 500 mg in sodium chloride 0.9 % 250 mL IVPB (Sgsb2Cnr) (0 mg IntraVENous Stopped 2/23/23 0540)         CONSULTS:  Hospitalist    PROCEDURES: (None if blank)  Procedures:     CRITICAL CARE: (None if blank)      DISCHARGE PRESCRIPTIONS: (None if blank)  New Prescriptions    No medications on file       FINAL IMPRESSION      1. COPD exacerbation ([facility]    2. Pneumonia of lower lobe due to infectious organism, unspecified laterality    3. Hyponatremia          DISPOSITION/PLAN   DISPOSITION Admitted 02/23/2023 06:20:30 AM      OUTPATIENT FOLLOW UP THE PATIENT:  No follow-up provider specified.     ADRIEN Ling CNP, APRN - CNP  02/23/23 0560

## 2023-02-23 NOTE — FLOWSHEET NOTE
02/23/23 0834   Safe Environment   Safety Measures Other (comment)  (VN attempted admission. Pt not responsive to VN verbal cues. VN activated camera for safety check. Pt resting in bed.  Will reattempt.)

## 2023-02-23 NOTE — ED NOTES
Pt medicated per [facility] and updated on plan of care. Resting in bed watching TV. No distress noted. Call light within reach.      Nilda Morataya RN  02/23/23 2383

## 2023-02-23 NOTE — FLOWSHEET NOTE
02/23/23 0906   Safe Environment   Safety Measures Other (comment)  (VN attempted admission. Pt did not respond to verbal cues at this time to complete admission.  Sent message to [facility].)

## 2023-02-23 NOTE — ED NOTES
Pt transported to  23. Pt in stable condition. Floor contacted before transport.       Izaiah Flores  02/23/23 4198

## 2023-02-23 NOTE — ED NOTES
Patient resting in bed talking on phone. Respirations easy and unlabored. Call light within reach.        Margarete Cheadle, RN  02/23/23 7468

## 2023-02-23 NOTE — PROGRESS NOTES
Pt admitted to  [phone] in a wheelchair. Complaints: Shortness of breath. IV site free of s/s of infection or infiltration. Vital signs obtained. Assessment and data collection initiated. Two nurse skin assessment performed by Herber Manning and Lindsey Marques RN. Oriented to room. Policies and procedures for [facility]K explained. Chilango Cronin RN discussed hourly rounding with patient addressing 5 P's. Fall prevention and safety brochure discussed with patient. Bed alarm on. Call light in reach. The best day to schedule a follow up Dr appointment is:  Monday a.m. Explained patients right to have family, representative or physician notified of their admission. Patient has Declined for physician to be notified. Patient has Declined for family/representative to be notified. All questions answered with no further questions at this time.

## 2023-02-23 NOTE — LETTER
Radha Solano accompanied Jarrod Gonsalez at [facility] Renal Telemetry unit on 2/25/2023. If you have any questions or concerns, please don't hesitate to call. Zoraida Simmons RN Supervisor

## 2023-02-23 NOTE — ED NOTES
Patient resting in bed. Respirations easy and unlabored. No distress noted. Call light within reach.        Jeffrey Hassan RN  02/23/23 2514

## 2023-02-23 NOTE — ED NOTES
ED to inpatient nurses report    Chief Complaint   Patient presents with    Shortness of Breath      Present to ED from home  LOC: alert and orientated to name, place, date  Vital signs   Vitals:    02/23/23 0121 02/23/23 0230 02/23/23 0330 02/23/23 0430   BP:  123/76 126/71 110/64   Pulse:  (!) 131 (!) 129 (!) 121   Resp:  26 27 26   Temp:       TempSrc:       SpO2: 94% 99% 92% 94%   Weight:       Height:          Oxygen Baseline 0    Current needs required 3L NC   LDAs:   Peripheral IV 08/30/21 Proximal;Right Antecubital (Active)       Peripheral IV 08/31/21 Left Forearm (Active)       Peripheral IV 02/23/23 Left Forearm (Active)   Site Assessment Clean, dry & intact 02/23/23 0134   Line Status Normal saline locked 02/23/23 0134   Dressing Status New dressing applied 02/23/23 0134   Dressing Type Transparent 02/23/23 0134   Dressing Intervention New 02/23/23 0134       Peripheral IV 02/23/23 Right Antecubital (Active)   Site Assessment Clean, dry & intact 02/23/23 0252   Line Status Normal saline locked 02/23/23 0252   Dressing Status New dressing applied 02/23/23 0252   Dressing Type Transparent 02/23/23 0252   Dressing Intervention New 02/23/23 0252     Mobility: Requires assistance * 1  Pending ED orders: None  Present condition: Stable      C-SSRS Risk of Suicide: No Risk  Swallow Screening    Preferred Language: [place]     Electronically signed by Jr Arzola RN on 2/23/2023 at 5:03 AM       Jr Arzola RN  02/23/23 2324

## 2023-02-24 ENCOUNTER — APPOINTMENT (OUTPATIENT)
Dept: INTERVENTIONAL RADIOLOGY/VASCULAR | Age: 68
DRG: 139 | End: 2023-02-24
Payer: MEDICAID

## 2023-02-24 LAB
ANION GAP SERPL CALC-SCNC: 9 MEQ/L (ref 8–16)
BACTERIA: ABNORMAL
BASOPHILS ABSOLUTE: 0.1 THOU/MM3 (ref 0–0.1)
BASOPHILS NFR BLD AUTO: 0.3 %
BILIRUB UR QL STRIP: NEGATIVE
BUN SERPL-MCNC: 9 MG/DL (ref 7–22)
CALCIUM SERPL-MCNC: 8.6 MG/DL (ref 8.5–10.5)
CASTS #/AREA URNS LPF: ABNORMAL /LPF
CASTS #/AREA URNS LPF: ABNORMAL /LPF
CHARACTER UR: CLEAR
CHARCOAL URNS QL MICRO: ABNORMAL
CHLORIDE 24H UR-SRATE: 161 MEQ/L
CHLORIDE SERPL-SCNC: 97 MEQ/L (ref 98–111)
CO2 SERPL-SCNC: 26 MEQ/L (ref 23–33)
COLOR UR: YELLOW
CREAT SERPL-MCNC: 0.3 MG/DL (ref 0.4–1.2)
CRYSTALS URNS QL MICRO: ABNORMAL
DEPRECATED RDW RBC AUTO: 45.1 FL (ref 35–45)
EOSINOPHIL NFR BLD AUTO: 0 %
EOSINOPHILS ABSOLUTE: 0 THOU/MM3 (ref 0–0.4)
EPITHELIAL CELLS, UA: ABNORMAL /HPF
ERYTHROCYTE [DISTWIDTH] IN BLOOD BY AUTOMATED COUNT: 11.8 % (ref 11.5–14.5)
FERRITIN SERPL IA-MCNC: 606 NG/ML (ref 10–291)
GFR SERPL CREATININE-BSD FRML MDRD: > 60 ML/MIN/1.73M2
GLUCOSE SERPL-MCNC: 172 MG/DL (ref 70–108)
GLUCOSE UR QL STRIP.AUTO: 100 MG/DL
HCT VFR BLD AUTO: 28.1 % (ref 37–47)
HGB BLD-MCNC: 9.5 GM/DL (ref 12–16)
HGB UR QL STRIP.AUTO: NEGATIVE
IGE SERPL-ACNC: 483 IU/ML
IMM GRANULOCYTES # BLD AUTO: 0.14 THOU/MM3 (ref 0–0.07)
IMM GRANULOCYTES NFR BLD AUTO: 0.7 %
IRON SATN MFR SERPL: 40 % (ref 20–50)
IRON SERPL-MCNC: 62 UG/DL (ref 50–170)
KETONES UR QL STRIP.AUTO: NEGATIVE
L PNEUMO1 AG UR QL IA.RAPID: NEGATIVE
LEUKOCYTE ESTERASE UR QL STRIP.AUTO: NEGATIVE
LYMPHOCYTES ABSOLUTE: 2.1 THOU/MM3 (ref 1–4.8)
LYMPHOCYTES NFR BLD AUTO: 10.7 %
MCH RBC QN AUTO: 35.4 PG (ref 26–33)
MCHC RBC AUTO-ENTMCNC: 33.8 GM/DL (ref 32.2–35.5)
MCV RBC AUTO: 104.9 FL (ref 81–99)
MONOCYTES ABSOLUTE: 1.2 THOU/MM3 (ref 0.4–1.3)
MONOCYTES NFR BLD AUTO: 6.2 %
MRSA DNA SPEC QL NAA+PROBE: POSITIVE
NEUTROPHILS NFR BLD AUTO: 82.1 %
NITRITE UR QL STRIP.AUTO: NEGATIVE
NRBC BLD AUTO-RTO: 0 /100 WBC
OSMOLALITY SERPL: 277 MOSMOL/KG (ref 275–295)
OSMOLALITY UR: 557 MOSMOL/KG (ref 250–750)
PH UR STRIP.AUTO: 6 [PH] (ref 5–9)
PLATELET # BLD AUTO: 369 THOU/MM3 (ref 130–400)
PMV BLD AUTO: 9.3 FL (ref 9.4–12.4)
POTASSIUM SERPL-SCNC: 4.3 MEQ/L (ref 3.5–5.2)
POTASSIUM UR-SCNC: 26.8 MEQ/L
PROT UR STRIP.AUTO-MCNC: 30 MG/DL
RBC # BLD AUTO: 2.68 MILL/MM3 (ref 4.2–5.4)
RBC #/AREA URNS HPF: ABNORMAL /HPF
RENAL EPI CELLS #/AREA URNS HPF: ABNORMAL /[HPF]
SEGMENTED NEUTROPHILS ABSOLUTE COUNT: 16.2 THOU/MM3 (ref 1.8–7.7)
SODIUM SERPL-SCNC: 124 MEQ/L (ref 135–145)
SODIUM SERPL-SCNC: 124 MEQ/L (ref 135–145)
SODIUM SERPL-SCNC: 125 MEQ/L (ref 135–145)
SODIUM SERPL-SCNC: 125 MEQ/L (ref 135–145)
SODIUM SERPL-SCNC: 127 MEQ/L (ref 135–145)
SODIUM SERPL-SCNC: 131 MEQ/L (ref 135–145)
SODIUM SERPL-SCNC: 132 MEQ/L (ref 135–145)
SODIUM SERPL-SCNC: 133 MEQ/L (ref 135–145)
SODIUM SERPL-SCNC: 134 MEQ/L (ref 135–145)
SODIUM UR-SCNC: 23 MEQ/L
SODIUM UR-SCNC: 23 MEQ/L
SPECIFIC GRAVITY UA: 1.02 (ref 1–1.03)
STREP PNEUMO AG, UR: NEGATIVE
TIBC SERPL-MCNC: 154 UG/DL (ref 171–450)
UROBILINOGEN, URINE: 0.2 EU/DL (ref 0–1)
WBC # BLD AUTO: 19.7 THOU/MM3 (ref 4.8–10.8)
WBC #/AREA URNS HPF: ABNORMAL /HPF
YEAST LIKE FUNGI URNS QL MICRO: ABNORMAL

## 2023-02-24 PROCEDURE — 82728 ASSAY OF FERRITIN: CPT

## 2023-02-24 PROCEDURE — 1200000000 HC SEMI PRIVATE

## 2023-02-24 PROCEDURE — 6370000000 HC RX 637 (ALT 250 FOR IP)

## 2023-02-24 PROCEDURE — 83935 ASSAY OF URINE OSMOLALITY: CPT

## 2023-02-24 PROCEDURE — 93010 ELECTROCARDIOGRAM REPORT: CPT | Performed by: NUCLEAR MEDICINE

## 2023-02-24 PROCEDURE — 87641 MR-STAPH DNA AMP PROBE: CPT

## 2023-02-24 PROCEDURE — 82785 ASSAY OF IGE: CPT

## 2023-02-24 PROCEDURE — 99223 1ST HOSP IP/OBS HIGH 75: CPT | Performed by: INTERNAL MEDICINE

## 2023-02-24 PROCEDURE — 83540 ASSAY OF IRON: CPT

## 2023-02-24 PROCEDURE — 94669 MECHANICAL CHEST WALL OSCILL: CPT

## 2023-02-24 PROCEDURE — 36415 COLL VENOUS BLD VENIPUNCTURE: CPT

## 2023-02-24 PROCEDURE — 2580000003 HC RX 258: Performed by: PSYCHIATRY & NEUROLOGY

## 2023-02-24 PROCEDURE — 93970 EXTREMITY STUDY: CPT

## 2023-02-24 PROCEDURE — 85025 COMPLETE CBC W/AUTO DIFF WBC: CPT

## 2023-02-24 PROCEDURE — 93005 ELECTROCARDIOGRAM TRACING: CPT | Performed by: STUDENT IN AN ORGANIZED HEALTH CARE EDUCATION/TRAINING PROGRAM

## 2023-02-24 PROCEDURE — 84133 ASSAY OF URINE POTASSIUM: CPT

## 2023-02-24 PROCEDURE — 83930 ASSAY OF BLOOD OSMOLALITY: CPT

## 2023-02-24 PROCEDURE — 82436 ASSAY OF URINE CHLORIDE: CPT

## 2023-02-24 PROCEDURE — 84300 ASSAY OF URINE SODIUM: CPT

## 2023-02-24 PROCEDURE — 94640 AIRWAY INHALATION TREATMENT: CPT

## 2023-02-24 PROCEDURE — 2580000003 HC RX 258

## 2023-02-24 PROCEDURE — 6360000002 HC RX W HCPCS

## 2023-02-24 PROCEDURE — 84295 ASSAY OF SERUM SODIUM: CPT

## 2023-02-24 PROCEDURE — 81001 URINALYSIS AUTO W/SCOPE: CPT

## 2023-02-24 PROCEDURE — 83550 IRON BINDING TEST: CPT

## 2023-02-24 PROCEDURE — 2580000003 HC RX 258: Performed by: INTERNAL MEDICINE

## 2023-02-24 PROCEDURE — 1200000003 HC TELEMETRY R&B

## 2023-02-24 PROCEDURE — 2700000000 HC OXYGEN THERAPY PER DAY

## 2023-02-24 PROCEDURE — 99233 SBSQ HOSP IP/OBS HIGH 50: CPT | Performed by: STUDENT IN AN ORGANIZED HEALTH CARE EDUCATION/TRAINING PROGRAM

## 2023-02-24 PROCEDURE — 80048 BASIC METABOLIC PNL TOTAL CA: CPT

## 2023-02-24 PROCEDURE — 6360000002 HC RX W HCPCS: Performed by: PSYCHIATRY & NEUROLOGY

## 2023-02-24 RX ORDER — DEXTROSE MONOHYDRATE 50 MG/ML
INJECTION, SOLUTION INTRAVENOUS CONTINUOUS
Status: DISCONTINUED | OUTPATIENT
Start: 2023-02-24 | End: 2023-02-24

## 2023-02-24 RX ORDER — ENOXAPARIN SODIUM 100 MG/ML
30 INJECTION SUBCUTANEOUS DAILY
Status: DISCONTINUED | OUTPATIENT
Start: 2023-02-24 | End: 2023-02-27 | Stop reason: HOSPADM

## 2023-02-24 RX ORDER — ENOXAPARIN SODIUM 100 MG/ML
1 INJECTION SUBCUTANEOUS 2 TIMES DAILY
Status: DISCONTINUED | OUTPATIENT
Start: 2023-02-24 | End: 2023-02-24

## 2023-02-24 RX ADMIN — SODIUM CHLORIDE, PRESERVATIVE FREE 10 ML: 5 INJECTION INTRAVENOUS at 08:54

## 2023-02-24 RX ADMIN — SODIUM CHLORIDE, PRESERVATIVE FREE 10 ML: 5 INJECTION INTRAVENOUS at 20:29

## 2023-02-24 RX ADMIN — Medication 1 TABLET: at 08:56

## 2023-02-24 RX ADMIN — PREDNISONE 40 MG: 20 TABLET ORAL at 08:56

## 2023-02-24 RX ADMIN — ACETAMINOPHEN 650 MG: 325 TABLET ORAL at 14:01

## 2023-02-24 RX ADMIN — CEFTRIAXONE SODIUM 1000 MG: 1 INJECTION, POWDER, FOR SOLUTION INTRAMUSCULAR; INTRAVENOUS at 03:25

## 2023-02-24 RX ADMIN — AZITHROMYCIN MONOHYDRATE 250 MG: 500 INJECTION, POWDER, LYOPHILIZED, FOR SOLUTION INTRAVENOUS at 17:40

## 2023-02-24 RX ADMIN — ACETAMINOPHEN 650 MG: 325 TABLET ORAL at 20:27

## 2023-02-24 RX ADMIN — DEXTROSE MONOHYDRATE 1000 ML: 50 INJECTION, SOLUTION INTRAVENOUS at 08:57

## 2023-02-24 RX ADMIN — ACETAMINOPHEN 650 MG: 325 TABLET ORAL at 06:08

## 2023-02-24 RX ADMIN — ENOXAPARIN SODIUM 30 MG: 100 INJECTION SUBCUTANEOUS at 08:54

## 2023-02-24 RX ADMIN — DEXTROSE MONOHYDRATE: 50 INJECTION, SOLUTION INTRAVENOUS at 06:03

## 2023-02-24 RX ADMIN — Medication 100 MG: at 08:56

## 2023-02-24 RX ADMIN — FOLIC ACID 1 MG: 1 TABLET ORAL at 08:56

## 2023-02-24 RX ADMIN — ALBUTEROL SULFATE 2.5 MG: 2.5 SOLUTION RESPIRATORY (INHALATION) at 14:30

## 2023-02-24 RX ADMIN — SUCRALFATE 1 G: 1 TABLET ORAL at 14:01

## 2023-02-24 RX ADMIN — SUCRALFATE 1 G: 1 TABLET ORAL at 08:56

## 2023-02-24 RX ADMIN — SUCRALFATE 1 G: 1 TABLET ORAL at 17:51

## 2023-02-24 RX ADMIN — IPRATROPIUM BROMIDE 0.5 MG: 0.5 SOLUTION RESPIRATORY (INHALATION) at 14:30

## 2023-02-24 RX ADMIN — PANTOPRAZOLE SODIUM 40 MG: 40 TABLET, DELAYED RELEASE ORAL at 06:11

## 2023-02-24 RX ADMIN — AZITHROMYCIN DIHYDRATE 250 MG: 500 INJECTION, POWDER, LYOPHILIZED, FOR SOLUTION INTRAVENOUS at 04:33

## 2023-02-24 RX ADMIN — SUCRALFATE 1 G: 1 TABLET ORAL at 22:12

## 2023-02-24 ASSESSMENT — LIFESTYLE VARIABLES
HOW OFTEN DO YOU HAVE A DRINK CONTAINING ALCOHOL: 2-3 TIMES A WEEK
HOW MANY STANDARD DRINKS CONTAINING ALCOHOL DO YOU HAVE ON A TYPICAL DAY: 1 OR 2

## 2023-02-24 ASSESSMENT — PAIN DESCRIPTION - ORIENTATION
ORIENTATION: ANTERIOR
ORIENTATION: ANTERIOR

## 2023-02-24 ASSESSMENT — PAIN DESCRIPTION - FREQUENCY
FREQUENCY: INTERMITTENT
FREQUENCY: INTERMITTENT

## 2023-02-24 ASSESSMENT — PAIN DESCRIPTION - LOCATION
LOCATION: HEAD
LOCATION: ABDOMEN
LOCATION: HEAD

## 2023-02-24 ASSESSMENT — PAIN DESCRIPTION - PAIN TYPE
TYPE: ACUTE PAIN
TYPE: ACUTE PAIN

## 2023-02-24 ASSESSMENT — PAIN SCALES - GENERAL
PAINLEVEL_OUTOF10: 0
PAINLEVEL_OUTOF10: 0
PAINLEVEL_OUTOF10: 5
PAINLEVEL_OUTOF10: 0
PAINLEVEL_OUTOF10: 0
PAINLEVEL_OUTOF10: 6
PAINLEVEL_OUTOF10: 0
PAINLEVEL_OUTOF10: 6

## 2023-02-24 ASSESSMENT — PAIN DESCRIPTION - DESCRIPTORS
DESCRIPTORS: ACHING

## 2023-02-24 NOTE — PLAN OF CARE
Pt was admitted  with acute hyponatremia- initial sodium 121, asymptomatic. Suspect secondary to beer potomania. Pt was given 1L NS bolus in ED, repeat Na 126. Due to rate of overcorrection, I started the patient on  D5W 50cc/hr with a 500cc D5W bolus with Q4H checks upon admission. Sodium trend are as follows:   02:30- 121 meq/L  07:19- 126 meq/L  12:30- 127 meq/L  15:52- 126 meq/L  18:22- 132 meq/L    At approximately 19:30 I noted Na correction rate. I placed a stat consult to Nephrology- sent a perfect service message to Dr. Lebron Waters, then called him to further discuss the case. He recommended another D5W 750cc bolus, DDAVP 3mcg x 1 dose, with a repeat Na level 3 hours later. Orders placed. Evening hospitalist admitting Hung Gore PA-C made aware of patient and plan of care. Jaymie Hardy spoke with patient briefly evaluated patient at bedside, PT reportedly remains asymptomatic. Patient's RN, Linda Kirkpatrick called via perfect serve and made aware of new orders and to notify hospitalist oncall for any further correction > 132.        Electronically signed by Mitali Hernández PA-C on 2/23/2023 at 8:14 PM

## 2023-02-24 NOTE — PROGRESS NOTES
Internal Medicine Resident Progress Note    Patient:  June Proud    YOB: 1955  Unit/Bed:6K-23/023-A  MRN: [id number]    Acct: [de-identified]   PCP: Peyton Daniel DO    Date of Admission: 2/23/2023    Code Status: Full Code    Assessment/Plan:  Hypovolemic hypotonic hypoosmotic hyponatremia  Sodium 123 on admission. Serum osmolarity 277, urine osmolarity 557 urine sodium 23. Likely due to beer potomania. Overcorrected NS 1 L bolus in ED. Nephrology consulted overnight, s/p 1 L D5W bolus, DDAVP. Sodium in the afternoon of 127 on 2/24. Continue trending sodium every 3 hours, following nephrology's recommendations  Acute hypoxic respiratory failure  Secondary to COPD exacerbation and pneumonia. BL RA, was on 3 L O2 NC on admission, afebrile, tachycardic. Patient has been having SOB for 2-3 days. Wean O2 to SPO2> 90%  COPD exacerbation  No PFTs on record. Patient has a history of COPD. Currently requires O2 supplementation. Continue azithromycin until 2/25, prednisone until 2/27  Community-acquired pneumonia  WBC 13.8 on admission, Pro-Sam 0.29, chest x-ray displays possible patchy opacities in left middle lobe, left lower lobe. Continue Rocephin until 2/27  Alcohol abuse  Patient stated she has beer 3-4 times a week, last drink 2/22. PAWSS:1  Continue multivitamin, folate, thiamine with phenobarbital IV push panel  Chronic macrocytic anemia  Likely secondary to alcohol abuse. No signs of bleeding at this time. Trend CBC daily, transfuse if hemoglobin<7.  History of discoid lupus  Patient no longer takes Plaquenil due to switching PCPs.   Follow-up with PCP and/or rheumatologist  Hypokalemia, resolved  Trend daily BMP, replete if needed      Expected discharge date:  2/27    Disposition:   [x] [address]  Other-    ===================================================================      Chief Complaint: Shortness of breath    Hospital Course:     Per HPI \"Herlinda is a 75 y/o  female smoker with a PMHx of COPD, alcohol abuse who presents to [facility] ED today for the evaluation of shortness of breath x 2 days. Pt reports her shortness of breath started 2-3 days ago with activity, but has been progressively worsening since and now experiences significant shortness of breath at rest. Her symptoms do eventually improve with prolonged rest, worse with minimal exertion. She reports associated productive cough of white sputum. She denies associated chest pain, fever, chills, lightheadedness, dizziness. Pt reports smoking 3-4 ppd and drinks alcohol 3-4 days out of the week; her choice of alcohol is beer. She otherwise denies recent ill contact, recent illness or recent change in medication, sore throat, runny nose, sinus congestion, abdominal pain or change in bowel or urinary habits. \"    On 2/23, patient was initiated on prednisone, ceftriaxone and azithromycin. Overnight on 2/23, patient had rapid correction of sodium, from 121 to 132 within 14 hours. Patient received multiple D5W boluses, DDAVP 3 mcg x 1. Patient's sodium went down to 124 on 2/24. Subjective (past 24 hours):     Patient was found resting in bed in no acute distress. Patient admitted to cough, headache. Patient denied chest pain, SOB, N/V/C/D.    ROS: reviewed complete ROS unchanged unless otherwise stated in hospital course/subjective portion.        Medications:  Reviewed    Infusion Medications    sodium chloride       Scheduled Medications    enoxaparin  30 mg SubCUTAneous Daily    methylPREDNISolone  125 mg IntraVENous Once    pantoprazole  40 mg Oral QAM AC    sucralfate  1 g Oral 4x Daily    sodium chloride flush  10 mL IntraVENous 2 times per day    predniSONE  40 mg Oral Daily    azithromycin  250 mg IntraVENous Q24H    cefTRIAXone (ROCEPHIN) IV  1,000 mg IntraVENous Q24H    multivitamin  1 tablet Oral Daily    folic acid  1 mg Oral Daily    thiamine  100 mg Oral Daily    dextrose 5% lactated ringers  250 mL IntraVENous Once     PRN Meds: sodium chloride flush, sodium chloride, ondansetron **OR** ondansetron, polyethylene glycol, acetaminophen **OR** acetaminophen, potassium chloride **OR** potassium alternative oral replacement **OR** potassium chloride, magnesium sulfate, PHENobarbital **OR** PHENobarbital **OR** PHENobarbital (LUMINAL) IVPB, albuterol **AND** ipratropium        Intake/Output Summary (Last 24 hours) at 2/24/2023 1455  Last data filed at 2/24/2023 0618  Gross per 24 hour   Intake 500 ml   Output --   Net 500 ml       Exam:  /65   Pulse (!) 121   Temp 97.5 °F (36.4 °C) (Oral)   Resp 24   Ht 5' 3\" (1.6 m)   Wt 110 lb (49.9 kg)   SpO2 93% Comment: Pt's Sp02 was 85% on room air. PT conncected to 02 and after a few moments at 3L came up to 93%. BMI 19.49 kg/m²     General: No distress, appears stated age. Appears malnourished and chronically ill   Eyes:  PERRL. Conjunctivae/corneas clear. HENT: Head normal appearing. Nares normal. Oral mucosa moist.  Hearing intact. Neck: Supple, with full range of motion. Trachea midline. No gross JVD appreciated. Respiratory:  Normal effort. Without rales or rhonchi. Wheezing appreciated on auscultation in all lobes  Cardiovascular: Normal rate, regular rhythm with normal S1/S2 without murmurs. No lower extremity edema. Abdomen: Soft, non-tender, non-distended with normal bowel sounds. Musculoskeletal: No joint swelling or tenderness. Normal tone. No abnormal movements. Skin: Warm and dry. No rashes or lesions. Neurologic:  No focal sensory/motor deficits in the upper or lower extremities. Cranial nerves:  grossly non-focal 2-12. Psychiatric: Alert and oriented, normal insight and thought content. Appears agitated    Capillary Refill: Brisk,< 3 seconds. Peripheral Pulses: +2 palpable, equal bilaterally.        Labs:   Recent Labs     02/23/23  0127 02/24/23  0457   WBC 13.8* 19.7*   HGB 11.9* 9.5*   HCT 33.0* 28.1*   * 369     Recent Labs     02/23/23  0230 02/23/23  0719 02/24/23  0457 02/24/23  0708 02/24/23  0857 02/24/23  1216   *   < > 132* 133* 134* 127*   K 3.3*  --  4.3  --   --   --    CL 84*  --  97*  --   --   --    CO2 22*  --  26  --   --   --    BUN 7  --  9  --   --   --    CREATININE 0.3*  --  0.3*  --   --   --    CALCIUM 9.1  --  8.6  --   --   --     < > = values in this interval not displayed. No results for input(s): AST, ALT, BILIDIR, BILITOT, ALKPHOS in the last 72 hours. No results for input(s): INR in the last 72 hours. Recent Labs     02/23/23  0230 02/23/23  1230 02/23/23  1552   TROPONINT < 0.010 < 0.010 < 0.010     Recent Labs     02/23/23  0230   PROCAL 0.29*      Lab Results   Component Value Date/Time    NITRU NEGATIVE 02/24/2023 11:55 AM    WBCUA 0-2 02/24/2023 11:55 AM    BACTERIA NONE SEEN 02/24/2023 11:55 AM    RBCUA 0-2 02/24/2023 11:55 AM    BLOODU NEGATIVE 02/24/2023 11:55 AM    SPECGRAV 1.021 02/24/2023 11:55 AM    GLUCOSEU NEGATIVE 08/30/2021 08:46 PM       Radiology (48 hours):  CTA CHEST W WO CONTRAST    Result Date: 2/23/2023  1. No filling defects are noted within the pulmonary arterial vasculature to suggest the presence of pulmonary embolism. 2. Mediastinal and hilar lymphadenopathy has improved. Prominent diffuse tree-in-bud opacities suggesting small airway infection/inflammation have worsened in the left upper lobe and are slightly less prominent in the right lower lobe. 3. Chronic findings are discussed **This report has been created using voice recognition software. It may contain minor errors which are inherent in voice recognition technology. ** Final report electronically signed by Dr Savanna Cole on 2/23/2023 3:31 PM    XR CHEST PORTABLE    Result Date: 2/23/2023  Impression: Patchy opacities in the left mid and lower lung zones could be secondary to developing multifocal pneumonia or aspiration.  Questionable interstitial prominence. Concurrent mild edema may also be considered. This document has been electronically signed by: Roc Newman. Osvaldo Dakin, MD on 02/23/2023 02:17 AM    VL DUP LOWER EXTREMITY VENOUS BILATERAL    Result Date: 2/24/2023  No evidence of a DVT. **This report has been created using voice recognition software. It may contain minor errors which are inherent in voice recognition technology. ** Final report electronically signed by Dr Jane Kaminski on 2/24/2023 2:19 PM         Recent EF:    Lab Results   Component Value Date    LVEF 65 02/23/2023         Last micro:    Urine Culture:  No components found for: CURINE  Blood Culture:  No components found for: CBLOOD, CFUNGUSBL      DVT prophylaxis:    x Lovenox  SCDs  SQ Heparin  Gtt Herparin  Encourage ambulation   Already on Anticoagulation       Diet: ADULT DIET;  Regular  PT/OT: None  Tele: x continuous, none  IVF: None    Electronically signed by Lizeth Hernandez DO on 2/24/2023 at 2:55 PM    Case was discussed with Attending, Dr. Erica Hernandez DO

## 2023-02-24 NOTE — PLAN OF CARE
Problem: Chronic Conditions and Co-morbidities  Goal: Patient's chronic conditions and co-morbidity symptoms are monitored and maintained or improved  Outcome: Progressing  Flowsheets (Taken 2/24/2023 4733)  Care Plan - Patient's Chronic Conditions and Co-Morbidity Symptoms are Monitored and Maintained or Improved: Monitor and assess patient's chronic conditions and comorbid symptoms for stability, deterioration, or improvement     Problem: Skin/Tissue Integrity  Goal: Absence of new skin breakdown  Description: 1. Monitor for areas of redness and/or skin breakdown  2. Assess vascular access sites hourly  3. Every 4-6 hours minimum:  Change oxygen saturation probe site  4. Every 4-6 hours:  If on nasal continuous positive airway pressure, respiratory therapy assess nares and determine need for appliance change or resting period.   Outcome: Progressing     Problem: Pain  Goal: Verbalizes/displays adequate comfort level or baseline comfort level  Outcome: Progressing

## 2023-02-24 NOTE — PLAN OF CARE
Problem: Pain  Goal: Verbalizes/displays adequate comfort level or baseline comfort level  2/24/2023 0132 by Velasquez Noel RN  Outcome: Progressing  2/24/2023 0131 by Velasquez Noel RN  Outcome: Progressing     Problem: Respiratory - Adult  Goal: Achieves optimal ventilation and oxygenation  2/24/2023 0132 by Velasquez Noel RN  Outcome: Progressing  2/24/2023 0131 by Velasquez Noel RN  Outcome: Progressing     Problem: Skin/Tissue Integrity - Adult  Goal: Skin integrity remains intact  2/24/2023 0132 by Velasquez Noel RN  Outcome: Progressing  2/24/2023 0131 by Velasquez Noel RN  Outcome: Progressing  Flowsheets  Taken 2/23/2023 2031 by Velasquez Noel RN  Skin Integrity Remains Intact: Monitor for areas of redness and/or skin breakdown  Taken 2/23/2023 1645 by Gildardo Verduzco  Skin Integrity Remains Intact: Monitor for areas of redness and/or skin breakdown   Care plan reviewed with patient and Patient verbalize understanding of the plan of care and contribute to goal setting.

## 2023-02-24 NOTE — CONSULTS
[facility]    [address], [address]  [facility]  2/24/2023     Pt Name:    Juana Hoang  MRN:     [id number]   [id number]  YOB: 1955  Admit Date:    2/23/2023  1:08 AM  Primary Care Physician:  Keysha Perkins DO    [facility] Number:   [id number]    Reason for Consult:  Hyponatremia  Requesting provider:  Hospitalist    History:   The patient is a 74 y.o. female with history of COPD, chronic alcohol abuse who presented to the hospital with complaints of shortness of breath for 3 days associated with some generalized weakness, dyspnea on exertion, productive cough with whitish sputum. Reports symptoms are worse with minimal exertion. No alleviating factors. She reports poor appetite. She does report smoking 2 to 3 packs/day as well as drinking alcohol regularly. Denies any urinary complaints. No hematuria or dysuria. No dizziness or lightheadedness. On arrival yesterday morning her sodium was 121. Apparently patient received a normal saline bolus in the emergency room. She was managed by hospitalist services subsequently. I was called last night by the hospitalist (PA) and was informed that sodium had gone up to 132. Case discussed with hospitalist PA. Plan was made to order 750 mL bolus of D5W as well as DDAVP. Patient seen and examined this morning. I stopped LR infusion and started on dextrose infusion. Also ordered 1000 mL of D5W bolus. Patient not in any acute distress. She is awake and alert. Asymptomatic. Following commands but not a very good historian. Past Medical History:  Past Medical History:   Diagnosis Date    Colonoscopy refused     could not afford it. COPD (chronic obstructive pulmonary disease) (HCC)     Discoid lupus 12/17/2013    Seen Dr. Karthikeyan David. Bx discoid lupus. Under treatment. KAITLIN normal. Lesion on scalp.  Patient on Plaquenil and steroid cream     Lump in chest 2005    Lt side of the chest 5 x 5 cm cyst    Pneumonia     Tobacco abuse disorder        Past Surgical History:  Past Surgical History:   Procedure Laterality Date     SECTION      OVARY REMOVAL      left    UPPER GASTROINTESTINAL ENDOSCOPY N/A 2021    EGD BIOPSY performed by Sergey Mcgregor MD at [facility]    UPPER GASTROINTESTINAL ENDOSCOPY N/A 2021    EGD BIOPSY performed by Sergey Mcgregor MD at [facility]       Family History:  Family History   Problem Relation Age of Onset    Substance Abuse Father     Diabetes Mother        Social History:  Social History     Socioeconomic History    Marital status:      Spouse name: Not on file    Number of children: 4    Years of education: 9    Highest education level: Not on file   Occupational History    Not on file   Tobacco Use    Smoking status: Every Day     Packs/day: 1.00     Years: 20.00     Pack years: 20.00     Types: Cigarettes     Start date:     Smokeless tobacco: Never   Vaping Use    Vaping Use: Never used   Substance and Sexual Activity    Alcohol use: Yes     Alcohol/week: 3.0 standard drinks     Types: 3 Cans of beer per week     Comment: pt states she drinks around 8/9pm and drinks 2-3 beers    Drug use: No    Sexual activity: Yes     Partners: Male   Other Topics Concern    Not on file   Social History Narrative    Not on file     Social Determinants of Health     Financial Resource Strain: Not on file   Food Insecurity: Not on file   Transportation Needs: Not on file   Physical Activity: Not on file   Stress: Not on file   Social Connections: Not on file   Intimate Partner Violence: Not on file   Housing Stability: Not on file       Home Meds:  Prior to Admission medications    Medication Sig Start Date End Date Taking?  Authorizing Provider   sucralfate (CARAFATE) 1 GM tablet Take 1 g by mouth 4 times daily  Patient not taking: Reported on 2023    Historical Provider, MD   pantoprazole (PROTONIX) 40 MG tablet Take 1 tablet by mouth every morning (before breakfast)  Patient not taking: Reported on 2/23/2023 9/1/21   Stefano Ruvalcaba MD   acetaminophen (TYLENOL) 500 MG tablet Take 500 mg by mouth every 6 hours as needed for Pain or Fever  Patient not taking: Reported on 2/23/2023    Historical Provider, MD   hydroxychloroquine (PLAQUENIL) 200 MG tablet Take 1 tablet by mouth 2 times daily   Patient not taking: No sig reported 12/10/13   Historical Provider, MD       Review of Systems:  Constitutional: Positive for generalized weakness, fatigue, chills, shortness of breath  Head: Negative for headaches  Eyes: Negative for blurry vision or discharge  Ears: Negative for ear pain or hearing changes  Nose: Negative for runny nose or epistaxis  Respiratory: Positive for shortness of breath and cough.   Cardiovascular: Negative for chest pain  GI: Positive for nausea  : Negative for discharge, dysuria, or hematuria  Skin: Negative for rash  Musculoskeletal: Negative for joint pain, moves all ext  Neuro: Negative for numbness or tingling, negative for slurred speech  Psychiatric: Reports stable mood, negative for depression or insomnia    All other review of systems were reviewed and negative    Current Meds:  Infusion:    dextrose 100 mL/hr at 02/24/23 0603    sodium chloride       Meds:    enoxaparin  30 mg SubCUTAneous Daily    methylPREDNISolone  125 mg IntraVENous Once    pantoprazole  40 mg Oral QAM AC    sucralfate  1 g Oral 4x Daily    sodium chloride flush  10 mL IntraVENous 2 times per day    predniSONE  40 mg Oral Daily    azithromycin  250 mg IntraVENous Q24H    cefTRIAXone (ROCEPHIN) IV  1,000 mg IntraVENous Q24H    multivitamin  1 tablet Oral Daily    folic acid  1 mg Oral Daily    thiamine  100 mg Oral Daily    dextrose 5% lactated ringers  250 mL IntraVENous Once     Meds prn: sodium chloride flush, sodium chloride, ondansetron **OR** ondansetron, polyethylene glycol, acetaminophen **OR** acetaminophen, potassium chloride **OR** potassium alternative oral replacement **OR** potassium chloride, magnesium sulfate, PHENobarbital **OR** PHENobarbital **OR** PHENobarbital (LUMINAL) IVPB, albuterol **AND** ipratropium     Allergies/Intolerances: ALLERGIES: Patient has no known allergies. 24HR INTAKE/OUTPUT:    Intake/Output Summary (Last 24 hours) at 2/24/2023 0828  Last data filed at 2/24/2023 0618  Gross per 24 hour   Intake 500 ml   Output --   Net 500 ml     I/O last 3 completed shifts: In: 500 [P.O.:500]  Out: -   No intake/output data recorded. Admission weight: 110 lb (49.9 kg)  Wt Readings from Last 3 Encounters:   02/24/23 110 lb (49.9 kg)   11/01/21 78 lb 6.4 oz (35.6 kg)   09/10/21 79 lb 9.6 oz (36.1 kg)     Body mass index is 19.49 kg/m². Physical Examination:  VITALS:   Vitals:    02/23/23 2024 02/23/23 2259 02/24/23 0347 02/24/23 0445   BP: (!) 142/82 (!) 154/84 (!) 155/82    Pulse: (!) 118 (!) 139 (!) 120    Resp: 20 20 20    Temp: 98.1 °F (36.7 °C) 98.1 °F (36.7 °C) 98 °F (36.7 °C)    TempSrc: Oral Oral Oral    SpO2: 97% 96% 98% 91%   Weight:   110 lb (49.9 kg)    Height:         Weight:   Wt Readings from Last 3 Encounters:   02/24/23 110 lb (49.9 kg)   11/01/21 78 lb 6.4 oz (35.6 kg)   09/10/21 79 lb 9.6 oz (36.1 kg)     Constitutional and General Appearance: alert and cooperative with exam, appears comfortable, no distress, not diaphoretic, + somewhat cachectic and malnourished  Eyes: no icteric sclera in left eye or right eye,  no pallor conjunctiva in left or right eye, no discharge seen from left eye or right eye  Ears and Nose: normal external appearance of left and right ear. No active drainage from nose.    Oral: moist oral mucus membranes  Neck: No jugular venous distention  Lungs: Air entry diminished  Heart: S1, S2  Extremities: no LE edema, no tenderness  GI: soft, non-tender, no guarding, no distention  Skin: no rash seen on exposed extremities  Musculo: moves all extremities  Neuro: no slurred speech, no facial drooping  Psychiatric: + Anxious at times    Lab Data  CBC:   Recent Labs     02/23/23  0127 02/24/23  0457   WBC 13.8* 19.7*   HGB 11.9* 9.5*   HCT 33.0* 28.1*   * 369     BMP:  Recent Labs     02/23/23  0230 02/23/23  0719 02/24/23  0111 02/24/23  0457 02/24/23  0708   *   < > 131* 132* 133*   K 3.3*  --   --  4.3  --    CL 84*  --   --  97*  --    CO2 22*  --   --  26  --    BUN 7  --   --  9  --    CREATININE 0.3*  --   --  0.3*  --    GLUCOSE 157*  --   --  172*  --    CALCIUM 9.1  --   --  8.6  --    MG 1.9  --   --   --   --     < > = values in this interval not displayed. Hepatic: No results for input(s): LABALBU, AST, ALT, ALB, BILITOT, ALKPHOS in the last 72 hours. Additional Labs: Urine sodium 23  EF 65%, RVSP 60 mmHg    Chest x-ray. Patchy opacities left mid and lower lung zone, possible multifocal pneumonia, questionable interstitial prominence      Impression and Plan:  Hyponatremia. Urine electrolytes reviewed. Mostly prerenal/beer potomania type physiology. Urine sodium 23 on admission. Overcorrected with normal saline bolus given by emergency room on admission. Received hypotonic fluids subsequently. Also required DDAVP. I stopped D5 LR infusion. Changed fluids to D5W and gave 1 L of D5W bolus this morning. Repeat sodium is down to 127 at the time of this writing and therefore overall appropriate correction. Rate of overcorrection has been corrected. We will continue to monitor serial sodium levels  Hypokalemia. Improved  COPD exacerbation and pneumonia. On antibiotics and steroids  History of alcohol abuse  Leukocytosis  Anemia  Medications reviewed    Thank you for the consult. Please feel free to call me if you have any questions. Emiliano Meza MD  [facility]    This report has been created using voice recognition software. It may contain minor errors which are inherent in voice recognition technology.

## 2023-02-24 NOTE — PLAN OF CARE
Problem: Pain  Goal: Verbalizes/displays adequate comfort level or baseline comfort level  Outcome: Progressing     Problem: Respiratory - Adult  Goal: Achieves optimal ventilation and oxygenation  Outcome: Progressing     Problem: Skin/Tissue Integrity - Adult  Goal: Skin integrity remains intact  Outcome: Progressing  Flowsheets  Taken 2/23/2023 2031 by Macy Farr RN  Skin Integrity Remains Intact: Monitor for areas of redness and/or skin breakdown  Taken 2/23/2023 1645 by Alice Burden  Skin Integrity Remains Intact: Monitor for areas of redness and/or skin breakdown   . car

## 2023-02-24 NOTE — CARE COORDINATION
Case Management Assessment  Initial Evaluation    Date/Time of Evaluation: 2/24/2023 12:08 PM  Assessment Completed by: Savita Dasilva RN    If patient is discharged prior to next notation, then this note serves as note for discharge by case management. Patient Name: Juana Hoang                   YOB: 1955  Diagnosis: Hyponatremia [E87.1]  COPD exacerbation ([facility] [address] [J44.1]  Pneumonia of lower lobe due to infectious organism, unspecified laterality [J18.9]  Acute hypoxemic respiratory failure ([facility] [J96.01]                   Date / Time: 2/23/2023  1:08 AM  Location: [facility]     Patient Admission Status: Inpatient   Readmission Risk Low 0-14, Mod 15-19), High > 20: Readmission Risk Score: 14    Current PCP: Keysha Perkins, DO  PCP verified by CM? Yes    Chart Reviewed: Yes      History Provided by: Patient  Patient Orientation: Alert and Oriented    Patient Cognition: Alert    Hospitalization in the last 30 days (Readmission):  No    If yes, Readmission Assessment in CM Navigator will be completed. Advance Directives:      Code Status: Full Code   Patient's Primary Decision Maker is: Patient Declined (Legal Next of Kin Remains as Decision Maker)      Discharge Planning:    Patient lives with: Alone Type of Home: Apartment  Primary Care Giver: Self  Patient Support Systems include: Children   Current Financial resources: Medicaid  Current community resources: None  Current services prior to admission: None            Current DME:              Type of Home Care services:  None    ADLS  Prior functional level: Independent in ADLs/IADLs  Current functional level: Assistance with the following:, Mobility, Toileting    Family can provide assistance at DC: Yes  Would you like Case Management to discuss the discharge plan with any other family members/significant others, and if so, who?  No  Plans to Return to Present Housing: Yes  Other Identified Issues/Barriers to RETURNING to current housing: not at this time  Potential Assistance needed at discharge: N/A            Potential DME:    Patient expects to discharge to: [address] [place] [address] for transportation at discharge: Family    Financial    Payor: MEDICAID [place] / Plan: [facility] [address] / Product Type: *No Product type* /     Does insurance require precert for SNF: No    Potential assistance Purchasing Medications: No  Meds-to-Beds request: Yes      [id number], [address] [phone] - F [phone]  [address] [place] [address] [address]  [address] [place] [address]  Phone: [phone] Fax: [phone] [address] [facility], [address] [facility] [phone] Aretha Amari [phone]  [address] [facility] [address]  Phone: [phone] Fax: [phone]      Notes:    Factors facilitating achievement of predicted outcomes: Family support    Barriers to discharge: Communication deficit and Medical complications    Additional Case Management Notes: Na+ 133, WBC 19.7, CRP 18.18, Ddimer 1069. Sinus tachy, up to 139. D5 infusion, IV zithromax, IV rocephin, IV solumedrol x1, phenobarb protocol. BLE dopplers pending. Nephrology consult pending. The Plan for Transition of Care is related to the following treatment goals of Hyponatremia [E87.1]  COPD exacerbation (HCC) [J44.1]  Pneumonia of lower lobe due to infectious organism, unspecified laterality [J18.9]  Acute hypoxemic respiratory failure ([facility] [address] [J96.01]    Patient Goals/Plan/Treatment Preferences: Met with Chidi Rizo, she is somewhat difficult to understand with slurred speech. She does live alone but has support from her daughter. She states not having a PCP or insurance, but recently had a new patient appointment with Dr. Verdin, and shows to have [facility]. She declines HH at this time. Transportation/Food Security/Housekeeping Addressed: No issues identified.      Jun Stewart RN  Case Management Department

## 2023-02-25 LAB
ANION GAP SERPL CALC-SCNC: 10 MEQ/L (ref 8–16)
BASOPHILS ABSOLUTE: 0 THOU/MM3 (ref 0–0.1)
BASOPHILS NFR BLD AUTO: 0.2 %
BUN SERPL-MCNC: 4 MG/DL (ref 7–22)
CALCIUM SERPL-MCNC: 8.7 MG/DL (ref 8.5–10.5)
CHLORIDE SERPL-SCNC: 94 MEQ/L (ref 98–111)
CO2 SERPL-SCNC: 30 MEQ/L (ref 23–33)
CREAT SERPL-MCNC: 0.3 MG/DL (ref 0.4–1.2)
DEPRECATED RDW RBC AUTO: 45.6 FL (ref 35–45)
EOSINOPHIL NFR BLD AUTO: 0 %
EOSINOPHILS ABSOLUTE: 0 THOU/MM3 (ref 0–0.4)
ERYTHROCYTE [DISTWIDTH] IN BLOOD BY AUTOMATED COUNT: 11.9 % (ref 11.5–14.5)
GFR SERPL CREATININE-BSD FRML MDRD: > 60 ML/MIN/1.73M2
GLUCOSE SERPL-MCNC: 91 MG/DL (ref 70–108)
HCT VFR BLD AUTO: 30.1 % (ref 37–47)
HGB BLD-MCNC: 10 GM/DL (ref 12–16)
IMM GRANULOCYTES # BLD AUTO: 0.11 THOU/MM3 (ref 0–0.07)
IMM GRANULOCYTES NFR BLD AUTO: 0.7 %
LYMPHOCYTES ABSOLUTE: 3 THOU/MM3 (ref 1–4.8)
LYMPHOCYTES NFR BLD AUTO: 20.2 %
MCH RBC QN AUTO: 34.8 PG (ref 26–33)
MCHC RBC AUTO-ENTMCNC: 33.2 GM/DL (ref 32.2–35.5)
MCV RBC AUTO: 104.9 FL (ref 81–99)
MONOCYTES ABSOLUTE: 0.8 THOU/MM3 (ref 0.4–1.3)
MONOCYTES NFR BLD AUTO: 5.7 %
NEUTROPHILS NFR BLD AUTO: 73.2 %
NRBC BLD AUTO-RTO: 0 /100 WBC
PLATELET # BLD AUTO: 383 THOU/MM3 (ref 130–400)
PMV BLD AUTO: 9 FL (ref 9.4–12.4)
POTASSIUM SERPL-SCNC: 3.8 MEQ/L (ref 3.5–5.2)
RBC # BLD AUTO: 2.87 MILL/MM3 (ref 4.2–5.4)
SEGMENTED NEUTROPHILS ABSOLUTE COUNT: 10.8 THOU/MM3 (ref 1.8–7.7)
SODIUM SERPL-SCNC: 126 MEQ/L (ref 135–145)
SODIUM SERPL-SCNC: 130 MEQ/L (ref 135–145)
SODIUM SERPL-SCNC: 133 MEQ/L (ref 135–145)
SODIUM SERPL-SCNC: 134 MEQ/L (ref 135–145)
WBC # BLD AUTO: 14.7 THOU/MM3 (ref 4.8–10.8)

## 2023-02-25 PROCEDURE — 6360000002 HC RX W HCPCS: Performed by: PSYCHIATRY & NEUROLOGY

## 2023-02-25 PROCEDURE — 85025 COMPLETE CBC W/AUTO DIFF WBC: CPT

## 2023-02-25 PROCEDURE — 94669 MECHANICAL CHEST WALL OSCILL: CPT

## 2023-02-25 PROCEDURE — 80048 BASIC METABOLIC PNL TOTAL CA: CPT

## 2023-02-25 PROCEDURE — 93005 ELECTROCARDIOGRAM TRACING: CPT | Performed by: PSYCHIATRY & NEUROLOGY

## 2023-02-25 PROCEDURE — 6360000002 HC RX W HCPCS

## 2023-02-25 PROCEDURE — 99232 SBSQ HOSP IP/OBS MODERATE 35: CPT | Performed by: STUDENT IN AN ORGANIZED HEALTH CARE EDUCATION/TRAINING PROGRAM

## 2023-02-25 PROCEDURE — 6370000000 HC RX 637 (ALT 250 FOR IP): Performed by: PSYCHIATRY & NEUROLOGY

## 2023-02-25 PROCEDURE — 94761 N-INVAS EAR/PLS OXIMETRY MLT: CPT

## 2023-02-25 PROCEDURE — 2700000000 HC OXYGEN THERAPY PER DAY

## 2023-02-25 PROCEDURE — 99231 SBSQ HOSP IP/OBS SF/LOW 25: CPT | Performed by: INTERNAL MEDICINE

## 2023-02-25 PROCEDURE — 6370000000 HC RX 637 (ALT 250 FOR IP)

## 2023-02-25 PROCEDURE — 36415 COLL VENOUS BLD VENIPUNCTURE: CPT

## 2023-02-25 PROCEDURE — 2580000003 HC RX 258

## 2023-02-25 PROCEDURE — 94640 AIRWAY INHALATION TREATMENT: CPT

## 2023-02-25 PROCEDURE — 84295 ASSAY OF SERUM SODIUM: CPT

## 2023-02-25 PROCEDURE — 1200000000 HC SEMI PRIVATE

## 2023-02-25 RX ORDER — DOXYCYCLINE HYCLATE 100 MG
100 TABLET ORAL EVERY 12 HOURS SCHEDULED
Status: DISCONTINUED | OUTPATIENT
Start: 2023-02-25 | End: 2023-02-27 | Stop reason: HOSPADM

## 2023-02-25 RX ADMIN — IPRATROPIUM BROMIDE 0.5 MG: 0.5 SOLUTION RESPIRATORY (INHALATION) at 13:55

## 2023-02-25 RX ADMIN — IPRATROPIUM BROMIDE 0.5 MG: 0.5 SOLUTION RESPIRATORY (INHALATION) at 09:35

## 2023-02-25 RX ADMIN — IPRATROPIUM BROMIDE 0.5 MG: 0.5 SOLUTION RESPIRATORY (INHALATION) at 22:10

## 2023-02-25 RX ADMIN — SUCRALFATE 1 G: 1 TABLET ORAL at 12:28

## 2023-02-25 RX ADMIN — PANTOPRAZOLE SODIUM 40 MG: 40 TABLET, DELAYED RELEASE ORAL at 06:04

## 2023-02-25 RX ADMIN — Medication 100 MG: at 09:37

## 2023-02-25 RX ADMIN — ACETAMINOPHEN 650 MG: 325 TABLET ORAL at 21:39

## 2023-02-25 RX ADMIN — ALBUTEROL SULFATE 2.5 MG: 2.5 SOLUTION RESPIRATORY (INHALATION) at 22:10

## 2023-02-25 RX ADMIN — ENOXAPARIN SODIUM 30 MG: 100 INJECTION SUBCUTANEOUS at 09:41

## 2023-02-25 RX ADMIN — SUCRALFATE 1 G: 1 TABLET ORAL at 16:58

## 2023-02-25 RX ADMIN — FOLIC ACID 1 MG: 1 TABLET ORAL at 09:37

## 2023-02-25 RX ADMIN — ALBUTEROL SULFATE 2.5 MG: 2.5 SOLUTION RESPIRATORY (INHALATION) at 09:35

## 2023-02-25 RX ADMIN — SUCRALFATE 1 G: 1 TABLET ORAL at 21:39

## 2023-02-25 RX ADMIN — SUCRALFATE 1 G: 1 TABLET ORAL at 09:37

## 2023-02-25 RX ADMIN — SODIUM CHLORIDE, PRESERVATIVE FREE 10 ML: 5 INJECTION INTRAVENOUS at 21:41

## 2023-02-25 RX ADMIN — DOXYCYCLINE HYCLATE 100 MG: 100 TABLET, COATED ORAL at 21:39

## 2023-02-25 RX ADMIN — ACETAMINOPHEN 650 MG: 325 TABLET ORAL at 11:44

## 2023-02-25 RX ADMIN — PREDNISONE 40 MG: 20 TABLET ORAL at 09:37

## 2023-02-25 RX ADMIN — SODIUM CHLORIDE, PRESERVATIVE FREE 10 ML: 5 INJECTION INTRAVENOUS at 09:37

## 2023-02-25 RX ADMIN — METOPROLOL TARTRATE 25 MG: 25 TABLET, FILM COATED ORAL at 16:57

## 2023-02-25 RX ADMIN — CEFTRIAXONE SODIUM 1000 MG: 1 INJECTION, POWDER, FOR SOLUTION INTRAMUSCULAR; INTRAVENOUS at 04:05

## 2023-02-25 RX ADMIN — Medication 1 TABLET: at 09:37

## 2023-02-25 RX ADMIN — ACETAMINOPHEN 650 MG: 325 TABLET ORAL at 04:07

## 2023-02-25 RX ADMIN — ALBUTEROL SULFATE 2.5 MG: 2.5 SOLUTION RESPIRATORY (INHALATION) at 13:55

## 2023-02-25 ASSESSMENT — PAIN DESCRIPTION - LOCATION
LOCATION: HEAD

## 2023-02-25 ASSESSMENT — PAIN SCALES - GENERAL
PAINLEVEL_OUTOF10: 2
PAINLEVEL_OUTOF10: 0
PAINLEVEL_OUTOF10: 3
PAINLEVEL_OUTOF10: 0
PAINLEVEL_OUTOF10: 0

## 2023-02-25 ASSESSMENT — PAIN - FUNCTIONAL ASSESSMENT: PAIN_FUNCTIONAL_ASSESSMENT: PREVENTS OR INTERFERES SOME ACTIVE ACTIVITIES AND ADLS

## 2023-02-25 ASSESSMENT — PAIN DESCRIPTION - DESCRIPTORS: DESCRIPTORS: ACHING

## 2023-02-25 ASSESSMENT — PAIN DESCRIPTION - ORIENTATION: ORIENTATION: ANTERIOR

## 2023-02-25 NOTE — FLOWSHEET NOTE
02/25/23 1627   Safe Environment   Safety Measures Other (comment)  (vn completed admission required document with pt at this time)   Call placed , pt responds to audio , permits video . Pt denied any voiced concerns or complaints at this time , call light within reach , no s/s distress noted .

## 2023-02-25 NOTE — RT PROTOCOL NOTE
RT Inhaler-Nebulizer Bronchodilator Protocol Note    There is a bronchodilator order in the chart from a provider indicating to follow the RT Bronchodilator Protocol and there is an Initiate RT Inhaler-Nebulizer Bronchodilator Protocol order as well (see protocol at bottom of note). CXR Findings:  No results found. The findings from the last RT Protocol Assessment were as follows:   History Pulmonary Disease: Chronic pulmonary disease  Respiratory Pattern: Dyspnea on exertion or RR 21-25 bpm  Breath Sounds: Inspiratory and expiratory or bilateral wheezing and/or rhonchi  Cough: Strong, spontaneous, non-productive  Indication for Bronchodilator Therapy: None  Bronchodilator Assessment Score: 10    Aerosolized bronchodilator medication orders have been revised according to the RT Inhaler-Nebulizer Bronchodilator Protocol below. Respiratory Therapist to perform RT Therapy Protocol Assessment initially then follow the protocol. Repeat RT Therapy Protocol Assessment PRN for score 0-3 or on second treatment, BID, and PRN for scores above 3. No Indications - adjust the frequency to every 6 hours PRN wheezing or bronchospasm, if no treatments needed after 48 hours then discontinue using Per Protocol order mode. If indication present, adjust the RT bronchodilator orders based on the Bronchodilator Assessment Score as indicated below. Use Inhaler orders unless patient has one or more of the following: on home nebulizer, not able to hold breath for 10 seconds, is not alert and oriented, cannot activate and use MDI correctly, or respiratory rate 25 breaths per minute or more, then use the equivalent nebulizer order(s) with same Frequency and PRN reasons based on the score. If a patient is on this medication at home then do not decrease Frequency below that used at home.     0-3 - enter or revise RT bronchodilator order(s) to equivalent RT Bronchodilator order with Frequency of every 4 hours PRN for wheezing or increased work of breathing using Per Protocol order mode. 4-6 - enter or revise RT Bronchodilator order(s) to two equivalent RT bronchodilator orders with one order with BID Frequency and one order with Frequency of every 4 hours PRN wheezing or increased work of breathing using Per Protocol order mode. 7-10 - enter or revise RT Bronchodilator order(s) to two equivalent RT bronchodilator orders with one order with TID Frequency and one order with Frequency of every 4 hours PRN wheezing or increased work of breathing using Per Protocol order mode. 11-13 - enter or revise RT Bronchodilator order(s) to one equivalent RT bronchodilator order with QID Frequency and an Albuterol order with Frequency of every 4 hours PRN wheezing or increased work of breathing using Per Protocol order mode. Greater than 13 - enter or revise RT Bronchodilator order(s) to one equivalent RT bronchodilator order with every 4 hours Frequency and an Albuterol order with Frequency of every 2 hours PRN wheezing or increased work of breathing using Per Protocol order mode. RT to enter RT Home Evaluation for COPD & MDI Assessment order using Per Protocol order mode.     Electronically signed by Bimal Herrera RCP on 2/25/2023 at 9:41 AM

## 2023-02-25 NOTE — PROGRESS NOTES
[facility]   [facility]   [address]   [place]   [phone]   Nephrology Hospital progress note       2/25/2023, 2:35 PM        Pt Name:    Merline Davis  MRN:     [id number]     YOB: 1955  Admit Date:    2/23/2023  1:08 AM  Primary Care Physician:  Indiana Dubois DO   Primary Nephrologist:          Dr. Ava Jackson number  [id number]    ISOLATION: none     Admitting Chief Complaint:   Shortness of breath and weakness. History of Chief Complaint:  The patient is a 74 y.o. female with history of COPD, chronic alcohol abuse who presented to the hospital with complaints of shortness of breath for 3 days associated with some generalized weakness, dyspnea on exertion, productive cough with whitish sputum. Reports symptoms are worse with minimal exertion. No alleviating factors. She reports poor appetite. She does report smoking 2 to 3 packs/day as well as drinking alcohol regularly. Denies any urinary complaints. No hematuria or dysuria. No dizziness or lightheadedness. On arrival yesterday morning her sodium was 121. Apparently patient received a normal saline bolus in the emergency room. She was managed by hospitalist services subsequently. I was called last night by the hospitalist (PA) and was informed that sodium had gone up to 132. Case discussed with hospitalist PA. Plan was made to order 750 mL bolus of D5W as well as DDAVP. Patient seen and examined this morning. I stopped LR infusion and started on dextrose infusion. Also ordered 1000 mL of D5W bolus. Patient not in any acute distress. She is awake and alert. Asymptomatic. Following commands but not a very good historian. Nephrology is treating:   Hyponatremia. Subjective: The patient was relaxing in bed. She was alert and lucid. She had minimal symptoms of hyponatremia and had no other complaints.      Lab Results   Component Value Date/Time    LABGLOM >60 02/25/2023 06:08 AM    LABGLOM >60 02/24/2023 04:57 AM    LABGLOM >60 02/23/2023 02:30 AM    LABGLOM >90 09/01/2021 05:49 AM    LABGLOM >90 08/31/2021 09:26 AM    LABGLOM >90 08/31/2021 05:47 AM          Baseline eGFR    Admit eGFR    Current eGFR          Objective:    Diet: regular    VITALS:  /67   Pulse (!) 117   Temp 98.6 °F (37 °C) (Oral)   Resp 18   Ht 5' 3\" (1.6 m)   Wt 110 lb (49.9 kg)   SpO2 95%   BMI 19.49 kg/m²   24HR INTAKE/OUTPUT:    Intake/Output Summary (Last 24 hours) at 2/25/2023 1435  Last data filed at 2/25/2023 1234  Gross per 24 hour   Intake 2920.6 ml   Output --   Net 2920.6 ml     Admission weight: 110 lb (49.9 kg)  Wt Readings from Last 3 Encounters:   02/24/23 110 lb (49.9 kg)   11/01/21 78 lb 6.4 oz (35.6 kg)   09/10/21 79 lb 9.6 oz (36.1 kg)     Body mass index is 19.49 kg/m². Physical examination    General:  Alert and cooperative with exam  HEENT:  Head: Normocephalic, no lesions, without obvious abnormality. Neck:   no adenopathy, no carotid bruit, no JVD, supple, symmetrical, trachea midline, and thyroid not enlarged, symmetric, no tenderness/mass/nodules  Lungs:  clear to auscultation bilaterally  Heart:  regular rate and rhythm, S1, S2 normal, no murmur, click, rub or gallop  Abdomen:  soft, non-tender; bowel sounds normal; no masses,  no organomegaly  Extremities:  extremities normal, atraumatic, no cyanosis or edema  Neurologic:  Mental status: Alert, oriented, thought content appropriate  Psy:                 No evidence of depression. Mood is stable. Skin:                Warm and dry. No lesions or rashes noted. Muscles:         Hand  and leg strength are equal and strong bilaterally.               Lab Data  CBC:   Recent Labs     02/23/23  0127 02/24/23  0457 02/25/23  0608   WBC 13.8* 19.7* 14.7*   HGB 11.9* 9.5* 10.0*   * 369 383     BMP:  Recent Labs     02/23/23  0230 02/23/23  0719 02/24/23  0457 02/24/23  0708 02/25/23  0224 02/25/23  7538 02/25/23  0616   *   < > 132*   < > 126* 134*  130* 133*   K 3.3*  --  4.3  --   --  3.8  --    CL 84*  --  97*  --   --  94*  --    CO2 22*  --  26  --   --  30  --    BUN 7  --  9  --   --  4*  --    CREATININE 0.3*  --  0.3*  --   --  0.3*  --    GLUCOSE 157*  --  172*  --   --  91  --    CALCIUM 9.1  --  8.6  --   --  8.7  --    MG 1.9  --   --   --   --   --   --     < > = values in this interval not displayed. TSH:   Recent Labs     02/23/23  1230   TSH 0.088*     HgBa1c: No results for input(s): LABA1C in the last 72 hours. Hepatic: No results for input(s): LABALBU, AST, ALT, ALB, BILITOT, ALKPHOS in the last 72 hours. Troponin: No results for input(s): TROPONINI in the last 72 hours. BNP: No results for input(s): BNP in the last 72 hours. Lipids: No results for input(s): CHOL, HDL in the last 72 hours. Invalid input(s): LDLCALCU  INR: No results for input(s): INR in the last 72 hours. Assessment:    Hyponatremia. Hypokalemia-resolved  COPD  Alcohol abuse. anemia     Plan: It is OK with nephrology for the primary care provider to discharge the patient when ready. Velasquez Lopez,   [facility].

## 2023-02-25 NOTE — PROGRESS NOTES
02/25/23 0941   RT Protocol   History Pulmonary Disease 2   Respiratory pattern 2   Breath sounds 6   Cough 0   Indications for Bronchodilator Therapy None   Bronchodilator Assessment Score 10

## 2023-02-25 NOTE — PLAN OF CARE
Problem: Discharge Planning  Goal: Discharge to home or other facility with appropriate resources  2/25/2023 1232 by Yajaira Coulter RN  Outcome: Progressing  Flowsheets (Taken 2/24/2023 1915 by Lora Mayers RN)  Discharge to home or other facility with appropriate resources: Identify barriers to discharge with patient and caregiver  2/25/2023 0338 by Lora Mayers RN  Outcome: Progressing  Flowsheets (Taken 2/24/2023 1915)  Discharge to home or other facility with appropriate resources: Identify barriers to discharge with patient and caregiver     Problem: Chronic Conditions and Co-morbidities  Goal: Patient's chronic conditions and co-morbidity symptoms are monitored and maintained or improved  2/25/2023 1232 by Yajaira Coulter RN  Outcome: Progressing  Flowsheets (Taken 2/24/2023 1915 by Lora Mayers, RN)  Care Plan - Patient's Chronic Conditions and Co-Morbidity Symptoms are Monitored and Maintained or Improved: Monitor and assess patient's chronic conditions and comorbid symptoms for stability, deterioration, or improvement  2/25/2023 0338 by Lora Mayers RN  Outcome: Progressing  Flowsheets (Taken 2/24/2023 1915)  Care Plan - Patient's Chronic Conditions and Co-Morbidity Symptoms are Monitored and Maintained or Improved: Monitor and assess patient's chronic conditions and comorbid symptoms for stability, deterioration, or improvement     Problem: Skin/Tissue Integrity  Goal: Absence of new skin breakdown  Description: 1.  Monitor for areas of redness and/or skin breakdown  2.  Assess vascular access sites hourly  3.  Every 4-6 hours minimum:  Change oxygen saturation probe site  4.  Every 4-6 hours:  If on nasal continuous positive airway pressure, respiratory therapy assess nares and determine need for appliance change or resting period.  2/25/2023 1232 by Yajaira Coulter RN  Outcome: Progressing  2/25/2023 0338 by Lora Mayers RN  Outcome: Progressing     Problem: Pain  Goal: Verbalizes/displays  adequate comfort level or baseline comfort level  2/25/2023 1232 by Uma Wall RN  Outcome: Progressing  Flowsheets (Taken 2/25/2023 1232)  Verbalizes/displays adequate comfort level or baseline comfort level:   Encourage patient to monitor pain and request assistance   Assess pain using appropriate pain scale   Administer analgesics based on type and severity of pain and evaluate response   Implement non-pharmacological measures as appropriate and evaluate response   Consider cultural and social influences on pain and pain management  2/25/2023 0338 by Josette Stephens RN  Outcome: Progressing     Problem: Respiratory - Adult  Goal: Achieves optimal ventilation and oxygenation  2/25/2023 1232 by Uma Wall RN  Outcome: Progressing  Flowsheets (Taken 2/25/2023 1232)  Achieves optimal ventilation and oxygenation:   Assess for changes in respiratory status   Assess for changes in mentation and behavior  2/25/2023 0949 by Bimal Herrera RCP  Outcome: Progressing  2/25/2023 0338 by Josette Stephens RN  Outcome: Progressing  Goal: Clear lung sounds  Description: Clear lung sounds  2/25/2023 0949 by Bimal Herrera RCP  Outcome: Progressing     Problem: Cardiovascular - Adult  Goal: Maintains optimal cardiac output and hemodynamic stability  2/25/2023 1232 by Uma Wall RN  Outcome: Progressing  Flowsheets (Taken 2/23/2023 2031 by Josette Stephens RN)  Maintains optimal cardiac output and hemodynamic stability:   Monitor blood pressure and heart rate   Administer fluid and/or volume expanders as ordered  2/25/2023 0338 by Josette Stephens RN  Outcome: Progressing  Goal: Absence of cardiac dysrhythmias or at baseline  2/25/2023 1232 by Uma Wall RN  Outcome: Progressing  [address] (Taken 2/23/2023 2031 by Josette Stephens RN)  Absence of cardiac dysrhythmias or at baseline: Monitor cardiac rate and rhythm  2/25/2023 0338 by Josette Stephens RN  Outcome: Progressing     Problem: Skin/Tissue Integrity - Adult  Goal: Skin integrity remains intact  2/25/2023 1232 by Alta Cervantes RN  Outcome: Progressing  Flowsheets (Taken 2/24/2023 1915 by Tanner Shea RN)  Skin Integrity Remains Intact: Monitor for areas of redness and/or skin breakdown  2/25/2023 0338 by Tanner Shea RN  Outcome: Progressing  Flowsheets (Taken 2/24/2023 1915)  Skin Integrity Remains Intact: Monitor for areas of redness and/or skin breakdown  Goal: Oral mucous membranes remain intact  2/25/2023 1232 by Alta Cervantes RN  Outcome: Progressing  Flowsheets (Taken 2/24/2023 0845 by Erin Turcios RN)  Oral Mucous Membranes Remain Intact:   Assess oral mucosa and hygiene practices   Implement preventative oral hygiene regimen  2/25/2023 0338 by Tanner Shea RN  Outcome: Progressing     Problem: Genitourinary - Adult  Goal: Absence of urinary retention  2/25/2023 1232 by Alta Cervantes RN  Outcome: Progressing  Flowsheets (Taken 2/25/2023 1232)  Absence of urinary retention: Assess patients ability to void and empty bladder  2/25/2023 0338 by Tanner Shea RN  Outcome: Progressing     Problem: Infection - Adult  Goal: Absence of infection at discharge  2/25/2023 1232 by Alta Cervantes RN  Outcome: Progressing  [address] (Taken 2/24/2023 1915 by Tanner Shea RN)  Absence of infection at discharge:   Assess and monitor for signs and symptoms of infection   Monitor lab/diagnostic results  2/25/2023 0338 by Tanner Shea RN  Outcome: Progressing  Flowsheets (Taken 2/24/2023 1915)  Absence of infection at discharge:   Assess and monitor for signs and symptoms of infection   Monitor lab/diagnostic results  Goal: Absence of infection during hospitalization  2/25/2023 1232 by Alta Cervantes RN  Outcome: Progressing  [address] (Taken 2/24/2023 1915 by Tanner Shea RN)  Absence of infection during hospitalization:   Assess and monitor for signs and symptoms of infection   Monitor lab/diagnostic results  2/25/2023 0338 by Yary Ortega Vania Mistry RN  Outcome: Progressing  Flowsheets (Taken 2/24/2023 1915)  Absence of infection during hospitalization:   Assess and monitor for signs and symptoms of infection   Monitor lab/diagnostic results     Problem: Metabolic/Fluid and Electrolytes - Adult  Goal: Electrolytes maintained within normal limits  2/25/2023 1232 by Mercedes Pineda RN  Outcome: Nataly Duke (Taken 2/24/2023 1915 by Joleen Buitrago RN)  Electrolytes maintained within normal limits: Monitor labs and assess patient for signs and symptoms of electrolyte imbalances  2/25/2023 0338 by Joleen Buitrago RN  Outcome: Progressing  Flowsheets (Taken 2/24/2023 1915)  Electrolytes maintained within normal limits: Monitor labs and assess patient for signs and symptoms of electrolyte imbalances  Goal: Hemodynamic stability and optimal renal function maintained  2/25/2023 1232 by Mercedes Pineda RN  Outcome: Progressing  [address] (Taken 2/24/2023 1915 by Joleen Buitrgao RN)  Hemodynamic stability and optimal renal function maintained: Monitor labs and assess for signs and symptoms of volume excess or deficit  2/25/2023 0338 by Joleen Buitrago RN  Outcome: Progressing  Flowsheets (Taken 2/24/2023 1915)  Hemodynamic stability and optimal renal function maintained: Monitor labs and assess for signs and symptoms of volume excess or deficit     Problem: Hematologic - Adult  Goal: Maintains hematologic stability  2/25/2023 1232 by Mercedes Pineda RN  Outcome: Progressing  [address] (Taken 2/24/2023 1915 by Joleen Buitrago RN)  Maintains hematologic stability: Assess for signs and symptoms of bleeding or hemorrhage  2/25/2023 0338 by Joleen Buitrago RN  Outcome: Progressing  Flowsheets (Taken 2/24/2023 1915)  Maintains hematologic stability: Assess for signs and symptoms of bleeding or hemorrhage     Problem: Safety - Adult  Goal: Free from fall injury  2/25/2023 1232 by Mercedes Pineda RN  Outcome: Progressing  Flowsheets (Taken 2/25/2023 35 67 15)  Free From Fall Injury:   Instruct family/caregiver on patient safety   Based on caregiver fall risk screen, instruct family/caregiver to ask for assistance with transferring infant if caregiver noted to have fall risk factors  2/25/2023 0338 by Glenna Jonas RN  Outcome: Progressing     Problem: ABCDS Injury Assessment  Goal: Absence of physical injury  2/25/2023 1232 by Paulo Ortiz RN  Outcome: Progressing  Flowsheets (Taken 2/25/2023 1232)  Absence of Physical Injury: Implement safety measures based on patient assessment  2/25/2023 0338 by Glenna Jonas RN  Outcome: Progressing

## 2023-02-25 NOTE — PROGRESS NOTES
Internal Medicine Resident Progress Note    Patient:  Jaky Aguayo    YOB: 1955  Unit/Bed:6K-23/023-A  MRN: [id number]    Acct: [de-identified]   PCP: Manda Machuca DO    Date of Admission: 2/23/2023    Code Status: Full Code    Assessment/Plan:  Hypovolemic hypotonic hypoosmotic hyponatremia, improved  Sodium 123 on admission. Serum osmolarity 277, urine osmolarity 557 urine sodium 23. Likely due to beer potomania. Overcorrected NS 1 L bolus in ED. Nephrology consulted overnight, s/p 1 L D5W bolus, DDAVP. Sodium in the afternoon of 127 on 2/24. Sodium improved to 134 on 2/25. Nephrology signed off. Trend daily BMP, continue to encourage eating  Acute hypoxic respiratory failure  Secondary to COPD exacerbation and pneumonia. BL RA, was on 3 L O2 NC on admission, afebrile, tachycardic. Patient has been having SOB for 2-3 days. Wean O2 to SPO2> 90%  COPD exacerbation  No PFTs on record. Patient has a history of COPD. Currently requires O2 supplementation. Continue azithromycin until 2/25, prednisone until 2/27. Follow-up with pulmonology for PFTs. Schedule LAMA LABA's  Community-acquired pneumonia  WBC 13.8 on admission, Pro-Sam 0.29, chest x-ray displays possible patchy opacities in left middle lobe, left lower lobe. Continue Rocephin until 2/27, consider switching to Augmentin for discharge  Alcohol abuse  Patient stated she has beer 3-4 times a week, last drink 2/22. PAWSS:1  Continue multivitamin, folate, thiamine with phenobarbital IV push panel  Chronic macrocytic anemia  Likely secondary to alcohol abuse. No signs of bleeding at this time. Trend CBC daily, transfuse if hemoglobin<7.  History of discoid lupus  Patient no longer takes Plaquenil due to switching PCPs. Follow-up with PCP and/or rheumatologist  Prominent diffuse tree-in-bud opacities  Noted on CTA chest W/Wo contrast.  IgE elevated at 43. No peripheral eosinophilia concerning for ABPA.   Follow-up with a CTA in 3 months to reassess for improvement, and if findings/symptoms persist, follow-up with pulmonology for bronchoscopy  Hypokalemia, resolved  Trend daily BMP, replete if needed      Expected discharge date:  2/26    Disposition:   [x] [address]  Other-    ===================================================================      Chief Complaint: Shortness of breath    Hospital Course:     Per HPI \"Herlinda is a 75 y/o  female smoker with a PMHx of COPD, alcohol abuse who presents to [facility] ED today for the evaluation of shortness of breath x 2 days. Pt reports her shortness of breath started 2-3 days ago with activity, but has been progressively worsening since and now experiences significant shortness of breath at rest. Her symptoms do eventually improve with prolonged rest, worse with minimal exertion. She reports associated productive cough of white sputum. She denies associated chest pain, fever, chills, lightheadedness, dizziness. Pt reports smoking 3-4 ppd and drinks alcohol 3-4 days out of the week; her choice of alcohol is beer. She otherwise denies recent ill contact, recent illness or recent change in medication, sore throat, runny nose, sinus congestion, abdominal pain or change in bowel or urinary habits. \"    On 2/23, patient was initiated on prednisone, ceftriaxone and azithromycin. Overnight on 2/23, patient had rapid correction of sodium, from 121 to 132 within 14 hours. Patient received multiple D5W boluses, DDAVP 3 mcg x 1. Patient's sodium went down to 124 on 2/24. On 2/25, patient's sodium correction rate improved, resulting in sodium 134. Plan for possible discharge on 2/26 with home O2 eval.    Subjective (past 24 hours):     Patient was found resting in bed in no acute distress. Patient admitted to continued cough, headache and wheezes.   Patient denied chest pain, SOB, N/V/C/D.    ROS: reviewed complete ROS unchanged unless otherwise stated in hospital course/subjective portion. Medications:  Reviewed    Infusion Medications    sodium chloride       Scheduled Medications    albuterol  2.5 mg Nebulization TID    ipratropium  0.5 mg Nebulization TID    doxycycline hyclate  100 mg Oral 2 times per day    metoprolol tartrate  25 mg Oral BID    enoxaparin  30 mg SubCUTAneous Daily    methylPREDNISolone  125 mg IntraVENous Once    pantoprazole  40 mg Oral QAM AC    sucralfate  1 g Oral 4x Daily    sodium chloride flush  10 mL IntraVENous 2 times per day    predniSONE  40 mg Oral Daily    cefTRIAXone (ROCEPHIN) IV  1,000 mg IntraVENous Q24H    multivitamin  1 tablet Oral Daily    folic acid  1 mg Oral Daily    thiamine  100 mg Oral Daily    dextrose 5% lactated ringers  250 mL IntraVENous Once     PRN Meds: sodium chloride flush, sodium chloride, ondansetron **OR** ondansetron, polyethylene glycol, acetaminophen **OR** acetaminophen, potassium chloride **OR** potassium alternative oral replacement **OR** potassium chloride, magnesium sulfate, PHENobarbital **OR** PHENobarbital **OR** PHENobarbital (LUMINAL) IVPB, albuterol **AND** ipratropium        Intake/Output Summary (Last 24 hours) at 2/25/2023 1624  Last data filed at 2/25/2023 1234  Gross per 24 hour   Intake 2920.6 ml   Output --   Net 2920.6 ml       Exam:  /70   Pulse (!) 117   Temp 97.3 °F (36.3 °C) (Oral)   Resp 18   Ht 5' 3\" (1.6 m)   Wt 110 lb (49.9 kg)   SpO2 96%   BMI 19.49 kg/m²     General: No distress, appears stated age. Appears malnourished and chronically ill   Eyes:  PERRL. Conjunctivae/corneas clear. HENT: Head normal appearing. Nares normal. Oral mucosa moist.  Hearing intact. Neck: Supple, with full range of motion. Trachea midline. No gross JVD appreciated. Respiratory:  Normal effort. Without rales or rhonchi. Wheezing appreciated on auscultation in all lobes  Cardiovascular: Normal rate, regular rhythm with normal S1/S2 without murmurs.     No lower extremity edema. Abdomen: Soft, non-tender, non-distended with normal bowel sounds. Musculoskeletal: No joint swelling or tenderness. Normal tone. No abnormal movements. Skin: Warm and dry. No rashes or lesions. Neurologic:  No focal sensory/motor deficits in the upper or lower extremities. Cranial nerves:  grossly non-focal 2-12. Psychiatric: Alert and oriented, normal insight and thought content. Appears agitated    Capillary Refill: Brisk,< 3 seconds. Peripheral Pulses: +2 palpable, equal bilaterally. Labs:   Recent Labs     02/23/23  0127 02/24/23  0457 02/25/23  0608   WBC 13.8* 19.7* 14.7*   HGB 11.9* 9.5* 10.0*   HCT 33.0* 28.1* 30.1*   * 369 383     Recent Labs     02/23/23  0230 02/23/23  0719 02/24/23  0457 02/24/23  0708 02/25/23  0224 02/25/23  0608 02/25/23  0616   *   < > 132*   < > 126* 134*  130* 133*   K 3.3*  --  4.3  --   --  3.8  --    CL 84*  --  97*  --   --  94*  --    CO2 22*  --  26  --   --  30  --    BUN 7  --  9  --   --  4*  --    CREATININE 0.3*  --  0.3*  --   --  0.3*  --    CALCIUM 9.1  --  8.6  --   --  8.7  --     < > = values in this interval not displayed. No results for input(s): AST, ALT, BILIDIR, BILITOT, ALKPHOS in the last 72 hours. No results for input(s): INR in the last 72 hours. Recent Labs     02/23/23  0230 02/23/23  1230 02/23/23  1552   TROPONINT < 0.010 < 0.010 < 0.010     Recent Labs     02/23/23 0230   PROCAL 0.29*      Lab Results   Component Value Date/Time    NITRU NEGATIVE 02/24/2023 11:55 AM    WBCUA 0-2 02/24/2023 11:55 AM    BACTERIA NONE SEEN 02/24/2023 11:55 AM    RBCUA 0-2 02/24/2023 11:55 AM    BLOODU NEGATIVE 02/24/2023 11:55 AM    SPECGRAV 1.021 02/24/2023 11:55 AM    GLUCOSEU NEGATIVE 08/30/2021 08:46 PM       Radiology (48 hours):  CTA CHEST W WO CONTRAST    Result Date: 2/23/2023  1. No filling defects are noted within the pulmonary arterial vasculature to suggest the presence of pulmonary embolism.  2. Mediastinal and hilar lymphadenopathy has improved. Prominent diffuse tree-in-bud opacities suggesting small airway infection/inflammation have worsened in the left upper lobe and are slightly less prominent in the right lower lobe. 3. Chronic findings are discussed **This report has been created using voice recognition software. It may contain minor errors which are inherent in voice recognition technology. ** Final report electronically signed by Dr Mario Barnes on 2/23/2023 3:31 PM    XR CHEST PORTABLE    Result Date: 2/23/2023  Impression: Patchy opacities in the left mid and lower lung zones could be secondary to developing multifocal pneumonia or aspiration. Questionable interstitial prominence. Concurrent mild edema may also be considered. This document has been electronically signed by: Yoel Melendez. Morenita Stewart MD on 02/23/2023 02:17 AM    VL DUP LOWER EXTREMITY VENOUS BILATERAL    Result Date: 2/24/2023  No evidence of a DVT. **This report has been created using voice recognition software. It may contain minor errors which are inherent in voice recognition technology. ** Final report electronically signed by Dr Gely Haines on 2/24/2023 2:19 PM         Recent EF:    Lab Results   Component Value Date    LVEF 65 02/23/2023         Last micro:    Urine Culture:  No components found for: CURINE  Blood Culture:  No components found for: CBLOOD, CFUNGUSBL      DVT prophylaxis:    x Lovenox  SCDs  SQ Heparin  Gtt Herparin  Encourage ambulation   Already on Anticoagulation       Diet: ADULT DIET;  Regular  PT/OT: None  Tele: x continuous, none  IVF: None    Electronically signed by Rosanna Mckenzie DO on 2/25/2023 at 4:24 PM    Case was discussed with Attending, Dr. Samantha Nogueira DO

## 2023-02-25 NOTE — PLAN OF CARE
Problem: Chronic Conditions and Co-morbidities  Goal: Patient's chronic conditions and co-morbidity symptoms are monitored and maintained or improved  2/25/2023 0338 by Myla Mckenzie RN  Outcome: Progressing  Flowsheets (Taken 2/24/2023 1915)  Care Plan - Patient's Chronic Conditions and Co-Morbidity Symptoms are Monitored and Maintained or Improved: Monitor and assess patient's chronic conditions and comorbid symptoms for stability, deterioration, or improvement  2/24/2023 1715 by Chris Briggs RN  Outcome: Progressing  Flowsheets (Taken 2/24/2023 0845)  Care Plan - Patient's Chronic Conditions and Co-Morbidity Symptoms are Monitored and Maintained or Improved: Monitor and assess patient's chronic conditions and comorbid symptoms for stability, deterioration, or improvement     Problem: Respiratory - Adult  Goal: Achieves optimal ventilation and oxygenation  Outcome: Progressing   Care plan reviewed with patient and Patient verbalize understanding of the plan of care and contribute to goal setting.

## 2023-02-25 NOTE — PLAN OF CARE
Problem: Infection - Adult  Goal: Absence of infection during hospitalization  2/25/2023 1246 by Jorgito Graves RN  Flowsheets (Taken 2/25/2023 1246)  Absence of infection during hospitalization: Identify and instruct in appropriate isolation precautions for identified infection/condition  Note: Positive for MRSA in nares   2/25/2023 1232 by Jorgito Graves RN  Outcome: Progressing  [address] (Taken 2/24/2023 1915 by Douglas Masters RN)  Absence of infection during hospitalization:   Assess and monitor for signs and symptoms of infection   Monitor lab/diagnostic results  2/25/2023 0338 by Douglas Masters RN  Outcome: Progressing  Flowsheets (Taken 2/24/2023 1915)  Absence of infection during hospitalization:   Assess and monitor for signs and symptoms of infection   Monitor lab/diagnostic results

## 2023-02-25 NOTE — PLAN OF CARE
Problem: Respiratory - Adult  Goal: Achieves optimal ventilation and oxygenation  2/25/2023 0949 by Titi Silver RCP  Outcome: Progressing     Problem: Respiratory - Adult  Goal: Clear lung sounds  Description: Clear lung sounds  Outcome: Progressing

## 2023-02-25 NOTE — PROGRESS NOTES
Brief Intervention and Referral to Treatment Summary    Patient was provided PHQ-9, AUDIT-C and DAST Screening:      PHQ-9 Score: 0  AUDIT-C Score:  6  DAST Score:  0    Patients substance use is considered     Low Risk/Healthy   xxxx  Moderate Risk  Harmful  Dependent    Patients depression is considered:     Minimal  Mild   Moderate      xxxx  Moderately Severe  Severe    Brief Education Was Provided    Patient was receptive  Patient was not receptive   xxxx      Brief Intervention Is Provided (Only for AUDIT-C or DAST)     Patient reports readiness to decrease and/or stop use and a plan was discussed   Patient denies readiness to decrease and/or stop use and a plan was not discussed   xxxx      Recommendations/Referrals for Brief and/or Specialized Treatment Provided to Patient    Pt. Stated that she was able to manage any alcohol concern on her own. She stated that she did not want any resources at this.

## 2023-02-26 LAB
ANION GAP SERPL CALC-SCNC: 9 MEQ/L (ref 8–16)
BASOPHILS ABSOLUTE: 0 THOU/MM3 (ref 0–0.1)
BASOPHILS NFR BLD AUTO: 0.1 %
BUN SERPL-MCNC: 12 MG/DL (ref 7–22)
CALCIUM SERPL-MCNC: 8.4 MG/DL (ref 8.5–10.5)
CHLORIDE SERPL-SCNC: 97 MEQ/L (ref 98–111)
CO2 SERPL-SCNC: 31 MEQ/L (ref 23–33)
CREAT SERPL-MCNC: 0.3 MG/DL (ref 0.4–1.2)
DEPRECATED RDW RBC AUTO: 46.7 FL (ref 35–45)
EOSINOPHIL NFR BLD AUTO: 0.2 %
EOSINOPHILS ABSOLUTE: 0 THOU/MM3 (ref 0–0.4)
ERYTHROCYTE [DISTWIDTH] IN BLOOD BY AUTOMATED COUNT: 12.1 % (ref 11.5–14.5)
GFR SERPL CREATININE-BSD FRML MDRD: > 60 ML/MIN/1.73M2
GLUCOSE SERPL-MCNC: 93 MG/DL (ref 70–108)
HCT VFR BLD AUTO: 28.3 % (ref 37–47)
HGB BLD-MCNC: 9.5 GM/DL (ref 12–16)
IMM GRANULOCYTES # BLD AUTO: 0.1 THOU/MM3 (ref 0–0.07)
IMM GRANULOCYTES NFR BLD AUTO: 1 %
LYMPHOCYTES ABSOLUTE: 2.8 THOU/MM3 (ref 1–4.8)
LYMPHOCYTES NFR BLD AUTO: 27.4 %
MCH RBC QN AUTO: 35.4 PG (ref 26–33)
MCHC RBC AUTO-ENTMCNC: 33.6 GM/DL (ref 32.2–35.5)
MCV RBC AUTO: 105.6 FL (ref 81–99)
MONOCYTES ABSOLUTE: 1 THOU/MM3 (ref 0.4–1.3)
MONOCYTES NFR BLD AUTO: 9.6 %
NEUTROPHILS NFR BLD AUTO: 61.7 %
NRBC BLD AUTO-RTO: 0 /100 WBC
PLATELET # BLD AUTO: 393 THOU/MM3 (ref 130–400)
PMV BLD AUTO: 9 FL (ref 9.4–12.4)
POTASSIUM SERPL-SCNC: 3.6 MEQ/L (ref 3.5–5.2)
RBC # BLD AUTO: 2.68 MILL/MM3 (ref 4.2–5.4)
SEGMENTED NEUTROPHILS ABSOLUTE COUNT: 6.4 THOU/MM3 (ref 1.8–7.7)
SODIUM SERPL-SCNC: 137 MEQ/L (ref 135–145)
WBC # BLD AUTO: 10.3 THOU/MM3 (ref 4.8–10.8)

## 2023-02-26 PROCEDURE — 94640 AIRWAY INHALATION TREATMENT: CPT

## 2023-02-26 PROCEDURE — 99232 SBSQ HOSP IP/OBS MODERATE 35: CPT | Performed by: STUDENT IN AN ORGANIZED HEALTH CARE EDUCATION/TRAINING PROGRAM

## 2023-02-26 PROCEDURE — 93010 ELECTROCARDIOGRAM REPORT: CPT | Performed by: NUCLEAR MEDICINE

## 2023-02-26 PROCEDURE — 36415 COLL VENOUS BLD VENIPUNCTURE: CPT

## 2023-02-26 PROCEDURE — 6360000002 HC RX W HCPCS: Performed by: PSYCHIATRY & NEUROLOGY

## 2023-02-26 PROCEDURE — 6370000000 HC RX 637 (ALT 250 FOR IP)

## 2023-02-26 PROCEDURE — 85025 COMPLETE CBC W/AUTO DIFF WBC: CPT

## 2023-02-26 PROCEDURE — 6360000002 HC RX W HCPCS

## 2023-02-26 PROCEDURE — 99231 SBSQ HOSP IP/OBS SF/LOW 25: CPT | Performed by: INTERNAL MEDICINE

## 2023-02-26 PROCEDURE — 2700000000 HC OXYGEN THERAPY PER DAY

## 2023-02-26 PROCEDURE — 94761 N-INVAS EAR/PLS OXIMETRY MLT: CPT

## 2023-02-26 PROCEDURE — 6370000000 HC RX 637 (ALT 250 FOR IP): Performed by: PSYCHIATRY & NEUROLOGY

## 2023-02-26 PROCEDURE — 80048 BASIC METABOLIC PNL TOTAL CA: CPT

## 2023-02-26 PROCEDURE — 2580000003 HC RX 258

## 2023-02-26 PROCEDURE — 1200000000 HC SEMI PRIVATE

## 2023-02-26 RX ORDER — DOXYCYCLINE HYCLATE 100 MG
100 TABLET ORAL EVERY 12 HOURS SCHEDULED
Qty: 12 TABLET | Refills: 0 | Status: SHIPPED | OUTPATIENT
Start: 2023-02-26 | End: 2023-02-27 | Stop reason: SDUPTHER

## 2023-02-26 RX ORDER — ALBUTEROL SULFATE 90 UG/1
2 AEROSOL, METERED RESPIRATORY (INHALATION) 4 TIMES DAILY PRN
Qty: 54 G | Refills: 1 | Status: SHIPPED | OUTPATIENT
Start: 2023-02-26 | End: 2023-02-27 | Stop reason: SDUPTHER

## 2023-02-26 RX ORDER — FOLIC ACID 1 MG/1
1 TABLET ORAL DAILY
Qty: 30 TABLET | Refills: 3 | Status: SHIPPED | OUTPATIENT
Start: 2023-02-27 | End: 2023-02-27 | Stop reason: SDUPTHER

## 2023-02-26 RX ORDER — LANOLIN ALCOHOL/MO/W.PET/CERES
100 CREAM (GRAM) TOPICAL DAILY
Qty: 30 TABLET | Refills: 3 | Status: SHIPPED | OUTPATIENT
Start: 2023-02-27 | End: 2023-02-27 | Stop reason: SDUPTHER

## 2023-02-26 RX ORDER — PREDNISONE 20 MG/1
40 TABLET ORAL DAILY
Qty: 4 TABLET | Refills: 0 | Status: SHIPPED | OUTPATIENT
Start: 2023-02-27 | End: 2023-02-27 | Stop reason: SDUPTHER

## 2023-02-26 RX ORDER — CEFDINIR 300 MG/1
300 CAPSULE ORAL 2 TIMES DAILY
Qty: 10 CAPSULE | Refills: 0 | Status: SHIPPED | OUTPATIENT
Start: 2023-02-26 | End: 2023-02-27 | Stop reason: SDUPTHER

## 2023-02-26 RX ORDER — MULTIVITAMIN WITH IRON
1 TABLET ORAL DAILY
Qty: 30 TABLET | Refills: 0 | Status: SHIPPED | OUTPATIENT
Start: 2023-02-27 | End: 2023-02-27 | Stop reason: SDUPTHER

## 2023-02-26 RX ADMIN — ACETAMINOPHEN 650 MG: 325 TABLET ORAL at 08:01

## 2023-02-26 RX ADMIN — IPRATROPIUM BROMIDE 0.5 MG: 0.5 SOLUTION RESPIRATORY (INHALATION) at 22:04

## 2023-02-26 RX ADMIN — PANTOPRAZOLE SODIUM 40 MG: 40 TABLET, DELAYED RELEASE ORAL at 05:36

## 2023-02-26 RX ADMIN — SUCRALFATE 1 G: 1 TABLET ORAL at 13:50

## 2023-02-26 RX ADMIN — DOXYCYCLINE HYCLATE 100 MG: 100 TABLET, COATED ORAL at 21:24

## 2023-02-26 RX ADMIN — ALBUTEROL SULFATE 2.5 MG: 2.5 SOLUTION RESPIRATORY (INHALATION) at 08:20

## 2023-02-26 RX ADMIN — Medication 100 MG: at 08:01

## 2023-02-26 RX ADMIN — SUCRALFATE 1 G: 1 TABLET ORAL at 08:02

## 2023-02-26 RX ADMIN — Medication 1 TABLET: at 08:02

## 2023-02-26 RX ADMIN — SODIUM CHLORIDE, PRESERVATIVE FREE 10 ML: 5 INJECTION INTRAVENOUS at 08:01

## 2023-02-26 RX ADMIN — FOLIC ACID 1 MG: 1 TABLET ORAL at 08:02

## 2023-02-26 RX ADMIN — DOXYCYCLINE HYCLATE 100 MG: 100 TABLET, COATED ORAL at 08:01

## 2023-02-26 RX ADMIN — METOPROLOL TARTRATE 25 MG: 25 TABLET, FILM COATED ORAL at 08:02

## 2023-02-26 RX ADMIN — ENOXAPARIN SODIUM 30 MG: 100 INJECTION SUBCUTANEOUS at 08:01

## 2023-02-26 RX ADMIN — CEFTRIAXONE SODIUM 1000 MG: 1 INJECTION, POWDER, FOR SOLUTION INTRAMUSCULAR; INTRAVENOUS at 04:12

## 2023-02-26 RX ADMIN — ACETAMINOPHEN 650 MG: 325 TABLET ORAL at 18:47

## 2023-02-26 RX ADMIN — SUCRALFATE 1 G: 1 TABLET ORAL at 17:20

## 2023-02-26 RX ADMIN — IPRATROPIUM BROMIDE 0.5 MG: 0.5 SOLUTION RESPIRATORY (INHALATION) at 14:03

## 2023-02-26 RX ADMIN — ALBUTEROL SULFATE 2.5 MG: 2.5 SOLUTION RESPIRATORY (INHALATION) at 14:03

## 2023-02-26 RX ADMIN — SUCRALFATE 1 G: 1 TABLET ORAL at 21:23

## 2023-02-26 RX ADMIN — ALBUTEROL SULFATE 2.5 MG: 2.5 SOLUTION RESPIRATORY (INHALATION) at 22:04

## 2023-02-26 RX ADMIN — PREDNISONE 40 MG: 20 TABLET ORAL at 08:02

## 2023-02-26 RX ADMIN — METOPROLOL TARTRATE 25 MG: 25 TABLET, FILM COATED ORAL at 21:24

## 2023-02-26 RX ADMIN — IPRATROPIUM BROMIDE 0.5 MG: 0.5 SOLUTION RESPIRATORY (INHALATION) at 08:20

## 2023-02-26 ASSESSMENT — PAIN DESCRIPTION - ORIENTATION
ORIENTATION: ANTERIOR
ORIENTATION: MID

## 2023-02-26 ASSESSMENT — PAIN DESCRIPTION - LOCATION
LOCATION: HEAD
LOCATION: HEAD

## 2023-02-26 ASSESSMENT — PAIN SCALES - GENERAL
PAINLEVEL_OUTOF10: 0
PAINLEVEL_OUTOF10: 5
PAINLEVEL_OUTOF10: 8
PAINLEVEL_OUTOF10: 0

## 2023-02-26 ASSESSMENT — PAIN DESCRIPTION - DESCRIPTORS: DESCRIPTORS: ACHING

## 2023-02-26 NOTE — PLAN OF CARE
Problem: Chronic Conditions and Co-morbidities  Goal: Patient's chronic conditions and co-morbidity symptoms are monitored and maintained or improved  2/26/2023 0002 by Nelly Wilkes RN  Outcome: Progressing  Flowsheets (Taken 2/25/2023 1951)  Care Plan - Patient's Chronic Conditions and Co-Morbidity Symptoms are Monitored and Maintained or Improved: Monitor and assess patient's chronic conditions and comorbid symptoms for stability, deterioration, or improvement  2/25/2023 1232 by Mani Mijares RN  Outcome: Progressing  [address] (Taken 2/24/2023 1915 by Nelly Wilkes RN)  Care Plan - Patient's Chronic Conditions and Co-Morbidity Symptoms are Monitored and Maintained or Improved: Monitor and assess patient's chronic conditions and comorbid symptoms for stability, deterioration, or improvement     Problem: Skin/Tissue Integrity  Goal: Absence of new skin breakdown  Description: 1. Monitor for areas of redness and/or skin breakdown  2. Assess vascular access sites hourly  3. Every 4-6 hours minimum:  Change oxygen saturation probe site  4. Every 4-6 hours:  If on nasal continuous positive airway pressure, respiratory therapy assess nares and determine need for appliance change or resting period.   2/26/2023 0002 by Nelly Wilkes RN  Outcome: Progressing  2/25/2023 1232 by Mani Mijares RN  Outcome: Progressing     Problem: Cardiovascular - Adult  Goal: Maintains optimal cardiac output and hemodynamic stability  2/26/2023 0002 by Nelly Wilkes RN  Outcome: Progressing  Flowsheets (Taken 2/25/2023 1951)  Maintains optimal cardiac output and hemodynamic stability: Monitor blood pressure and heart rate  2/25/2023 1232 by Mani Mijares RN  Outcome: Progressing  [address] (Taken 2/23/2023 2031 by Nelly Wilkes RN)  Maintains optimal cardiac output and hemodynamic stability:   Monitor blood pressure and heart rate   Administer fluid and/or volume expanders as ordered   Care plan reviewed with patient and  Patient verbalize understanding of the plan of care and contribute to goal setting.

## 2023-02-26 NOTE — RT PROTOCOL NOTE
A home oxygen evaluation has been completed. [x]Patient is an inpatient. It is expected that the patient will be discharged within the next 48 hours. Qualified provider to write order for home prescription if patient qualifies. Social service/care managers will arrange for home oxygen. If patient is active, arrange for Home Medical supplier to assess for Oxygen Conserving Device per pulse oximetry. []Patient is an outpatient. Results will be faxed to the ordering provider. Qualified provider to write order for home prescription if patient qualifies and arranges for home oxygen. Patient was placed on room air for 4 minutes. SpO2 was 91 % on room air at rest. Patients SpO2 was 89% or above and did not qualify for home oxygen. Patient was walked for 7 minutes. SpO2 was 84 % during walking. Patients SpO2 was below 89% and qualified for home oxygen. Oxygen was applied at 2 lpm via nasal cannula to maintain a SpO2 between 90-92% while walking. Actual SpO2 was 93 %. If oxygen need is greater than 4 lpm the SpO2 on 4 lpm was 96. Note: For any SpO2 at 61% see policy and procedure for possible qualifications.

## 2023-02-26 NOTE — PROGRESS NOTES
Physician Progress Note      PATIENTTeola Brunner  CSN #:                  [id number]  :                       1955  ADMIT DATE:       2023 1:08 AM  DISCH DATE:  RESPONDING  PROVIDER #:        Nico Ann DO          QUERY TEXT:    Pt admitted with Acute hypoxic respiratory failure, secondary to COPD   exacerbation and PNA. Pt noted to have SIRS/Sepsis Criteria: wbc 13.8/ 19.7,   procal 0.29, LA 1.5, tachycardia 120-139/min, Tachypnea 26/min. Placed on   Zithromax and Rocephin. If possible, please document in the progress notes and   discharge summary if you are evaluating and /or treating any of the   following: The medical record reflects the following:  Risk Factors: Acute hypoxic respiratory failure, secondary to COPD   exacerbation and PNA. Clinical Indicators: Pt noted to have SIRS/Sepsis Criteria: wbc 13.8/ 19.7,   procal 0.29, LA 1.5, tachycardia 120-139/min, Tachypnea 26/min. Treatment: Placed on Zithromax and Rocephin    Thank you. Please call if you have any questions. P) [phone]. Signed   by Kaylan Jewell RN Clinical , CRCR  Options provided:  -- Sepsis confirmed after study and was present on admission  -- Sepsis treated and resolved  -- No Sepsis, localized infection only  -- Sepsis was ruled out  -- Other - I will add my own diagnosis  -- Disagree - Not applicable / Not valid  -- Disagree - Clinically unable to determine / Unknown  -- Refer to Clinical Documentation Reviewer    PROVIDER RESPONSE TEXT:    This patient has localized infection only, patient is not septic.     Query created by: Minal Baca on 2023 10:23 AM      Electronically signed by:  Nico Ann DO 2023 2:33 PM

## 2023-02-26 NOTE — PROGRESS NOTES
Hospitalist Progress Note      Patient:  Yvette Abebe    Unit/Bed:6K-23/023-A  YOB: 1955  MRN: [id number]   Acct: [de-identified]   PCP: Junito Oswald DO  Date of Admission: 2/23/2023    Assessment/Plan:    Acute hypotonic hyponatremia  Suspect beer potomania. Sodium levels stable. Ok to stop trending. Tolerating regular diet. Counseled on alcohol cessation  Acute hypoxic respiratory failure  AECOPD  Requires supplemental oxygen with exertion at 2L. Unable to be arranged today and will plan for discharge tomorrow with oxygen  Continue bronchodilators and prednisone  Will need to work with pharmacy to ensure medications are affordable to prevent readmission and improve clinical response. CAP  On rocephin/azithromycin. Will plan to DC on PO Cefdinir  CT chest with tree in bud appearance. Likely infectious. If no improvement following abx therapy, will need repeat CT chest and possible bronchoscopy. ABPA unlikely given only mild IgE elevation and no peripheral eosinophilia. Alcohol use disorder  Counseled on cessation. Likely contributing to her hyponatremia  On MTV, thiamine, folic acid  Chronic macrocytic anemia  May be related to underlying alcohol use  No evidence of bleeding  Hx discoid lupus  Stopped taking Plaquenil    Disposition: Likely DC home tomorrow with supplemental oxygen. Chief Complaint: SOB    Hospital Course:    Per HPI \"Herlinda is a 75 y/o  female smoker with a PMHx of COPD, alcohol abuse who presents to [facility] ED today for the evaluation of shortness of breath x 2 days. Pt reports her shortness of breath started 2-3 days ago with activity, but has been progressively worsening since and now experiences significant shortness of breath at rest. Her symptoms do eventually improve with prolonged rest, worse with minimal exertion. She reports associated productive cough of white sputum.  She denies associated chest pain, fever, chills, lightheadedness, dizziness. Pt reports smoking 3-4 ppd and drinks alcohol 3-4 days out of the week; her choice of alcohol is beer. She otherwise denies recent ill contact, recent illness or recent change in medication, sore throat, runny nose, sinus congestion, abdominal pain or change in bowel or urinary habits. \"     On 2/23, patient was initiated on prednisone, ceftriaxone and azithromycin. Overnight on 2/23, patient had rapid correction of sodium, from 121 to 132 within 14 hours. Patient received multiple D5W boluses, DDAVP 3 mcg x 1. Patient's sodium went down to 124 on 2/24. On 2/25, patient's sodium correction rate improved, resulting in sodium 134. Plan for possible discharge on 2/26 with home O2 eval.    Subjective (past 24 hours):   Patient feels well. Minimal SOB. Still having some wheezes. Worried about being able to afford her medications after discharge. Noted to have medicaid. Past medical history, family history, social history and allergies reviewed again and is unchanged since admission. ROS (12 point review of systems completed. Pertinent positives noted.  Otherwise ROS is negative)     Medications:  Reviewed    Infusion Medications    sodium chloride       Scheduled Medications    albuterol  2.5 mg Nebulization TID    ipratropium  0.5 mg Nebulization TID    doxycycline hyclate  100 mg Oral 2 times per day    metoprolol tartrate  25 mg Oral BID    enoxaparin  30 mg SubCUTAneous Daily    methylPREDNISolone  125 mg IntraVENous Once    pantoprazole  40 mg Oral QAM AC    sucralfate  1 g Oral 4x Daily    sodium chloride flush  10 mL IntraVENous 2 times per day    predniSONE  40 mg Oral Daily    cefTRIAXone (ROCEPHIN) IV  1,000 mg IntraVENous Q24H    multivitamin  1 tablet Oral Daily    folic acid  1 mg Oral Daily    thiamine  100 mg Oral Daily    dextrose 5% lactated ringers  250 mL IntraVENous Once     PRN Meds: sodium chloride flush, sodium chloride, ondansetron **OR** ondansetron, polyethylene glycol, acetaminophen **OR** acetaminophen, potassium chloride **OR** potassium alternative oral replacement **OR** potassium chloride, magnesium sulfate, PHENobarbital **OR** PHENobarbital **OR** PHENobarbital (LUMINAL) IVPB, albuterol **AND** ipratropium      Intake/Output Summary (Last 24 hours) at 2/26/2023 1602  Last data filed at 2/26/2023 1529  Gross per 24 hour   Intake 1450 ml   Output --   Net 1450 ml       Diet:  ADULT DIET; Regular    Exam:  /71   Pulse (!) 105   Temp 97.6 °F (36.4 °C) (Oral)   Resp 20   Ht 5' 3\" (1.6 m)   Wt 105 lb (47.6 kg)   SpO2 98%   BMI 18.60 kg/m²   General appearance: No apparent distress, appears stated age and cooperative. HEENT: Pupils equal, round, and reactive to light. Conjunctivae/corneas clear. Neck: Supple, with full range of motion. No jugular venous distention. Trachea midline. Respiratory: Moderate wheezes noted R>L. Mild rhonchi throughout. Cardiovascular: Regular rate and rhythm with normal S1/S2 without murmurs, rubs or gallops. Abdomen: Soft, non-tender, non-distended with normal bowel sounds. Musculoskeletal: passive and active ROM x 4 extremities. Skin: Skin color, texture, turgor normal.  No rashes or lesions. Neurologic:  Neurovascularly intact without any focal sensory/motor deficits.  Cranial nerves: II-XII intact, grossly non-focal.  Psychiatric: Alert and oriented, thought content appropriate, normal insight  Capillary Refill: Brisk,< 3 seconds   Peripheral Pulses: +2 palpable, equal bilaterally     Labs:   Recent Labs     02/24/23  0457 02/25/23  0608 02/26/23  0631   WBC 19.7* 14.7* 10.3   HGB 9.5* 10.0* 9.5*   HCT 28.1* 30.1* 28.3*    383 393     Recent Labs     02/24/23  0457 02/24/23  0708 02/25/23  0608 02/25/23  0616 02/26/23  0631   *   < > 134*  130* 133* 137   K 4.3  --  3.8  --  3.6   CL 97*  --  94*  --  97*   CO2 26  --  30  --  31   BUN 9  --  4*  --  12 CREATININE 0.3*  --  0.3*  --  0.3*   CALCIUM 8.6  --  8.7  --  8.4*    < > = values in this interval not displayed. No results for input(s): AST, ALT, BILIDIR, BILITOT, ALKPHOS in the last 72 hours. No results for input(s): INR in the last 72 hours. No results for input(s): Jayant Verdugo in the last 72 hours. Microbiology:    Blood culture #1:   Lab Results   Component Value Date/Time    BC No growth 24 hours. No growth 48 hours. 02/23/2023 02:30 AM       Blood culture #2:No results found for: Servando Calvin    Organism:No results found for: [facility]      Lab Results   Component Value Date/Time    LABGRAM  04/09/2018 12:00 PM     Quality of sputum specimen: Specimen acceptable. Many segmented neutrophils observed. Rare epithelial cells observed. Rare gram positive cocci occurring singly and in pairs. Rare gram negative bacilli. MRSA culture only:No results found for: [facility]    Urine culture: No results found for: LABURIN    Respiratory culture: No results found for: CULTRESP    Aerobic and Anaerobic :  No results found for: LABAERO  No results found for: LABANAE    Urinalysis:      Lab Results   Component Value Date/Time    NITRU NEGATIVE 02/24/2023 11:55 AM    WBCUA 0-2 02/24/2023 11:55 AM    BACTERIA NONE SEEN 02/24/2023 11:55 AM    RBCUA 0-2 02/24/2023 11:55 AM    BLOODU NEGATIVE 02/24/2023 11:55 AM    SPECGRAV 1.021 02/24/2023 11:55 AM    GLUCOSEU NEGATIVE 08/30/2021 08:46 PM       Radiology:  VL DUP LOWER EXTREMITY VENOUS BILATERAL   Final Result   No evidence of a DVT. **This report has been created using voice recognition software. It may contain minor errors which are inherent in voice recognition technology. **      Final report electronically signed by Dr Elian Avila on 2/24/2023 2:19 PM      CTA CHEST [address]   Final Result   1. No filling defects are noted within the pulmonary arterial vasculature to suggest the presence of pulmonary embolism.       2. Mediastinal and hilar lymphadenopathy has improved. Prominent diffuse tree-in-bud opacities suggesting small airway infection/inflammation have worsened in the left upper lobe and are slightly less prominent in the right lower lobe. 3. Chronic findings are discussed            **This report has been created using voice recognition software. It may contain minor errors which are inherent in voice recognition technology. **      Final report electronically signed by Dr Franco Lucas on 2/23/2023 3:31 PM      XR CHEST PORTABLE   Final Result   Impression:   Patchy opacities in the left mid and lower lung zones could be secondary    to developing multifocal pneumonia or aspiration. Questionable interstitial prominence. Concurrent mild edema may also be    considered. This document has been electronically signed by: Katherin Wyatt. Genevieve Barron MD    on 02/23/2023 02:17 AM        CTA CHEST W WO CONTRAST    Result Date: 2/23/2023  PROCEDURE: CTA CHEST W WO CONTRAST CLINICAL INFORMATION: Persistent tachycardia. Shortness of breath. COMPARISON: CTA chest 4/8/2018. TECHNIQUE: 1.5 mm axial images were obtained through the chest after the administration of IV contrast. A non-contrast localizer was obtained. 2mm MIP reconstructions of the chest performed in coronal and sagittal planes. All CT scans at this facility use dose modulation, iterative reconstruction, and/or weight based dosing when appropriate to reduce the radiation dose to as low as reasonably achievable. CONTRAST: 80 cc Isovue-370. FINDINGS: Pulmonary arteries: No filling defects are noted within the pulmonary arterial vasculature to suggest the presence of pulmonary embolism. Heart/mediastinum: The heart size is normal. Coronary artery calcifications are observed. No pericardial effusion is identified. No aortic aneurysm or dissection is observed. The thyroid gland is unremarkable.  Prominent prevascular lymph nodes measure up  to 8 mm in short axis, unchanged. Bilateral hilar lymph nodes measure up to 13 mm (previously measured up to 16 mm). Subcarinal lymph nodes measure up to 14 mm (previously measured up to 16 mm). No axillary lymphadenopathy is visualized. Lungs: Innumerable tree-in-bud nodular opacities are scattered throughout both upper and lower lobes most pronounced in the left upper lobe. The left upper lobe opacities are more prominent when compared to the prior examination. The right lower lobe opacities have improved. Mild peribronchial thickening appears stable. An area of localized consolidation/scarring in the posterior left lower lobe is stable (series 601, image 145). No pleural effusion or pneumothorax is observed. Upper abdomen: No acute findings are visualized in the limited images through the upper abdomen. Musculoskeletal: The visualized skeletal structures appear intact. 1. No filling defects are noted within the pulmonary arterial vasculature to suggest the presence of pulmonary embolism. 2. Mediastinal and hilar lymphadenopathy has improved. Prominent diffuse tree-in-bud opacities suggesting small airway infection/inflammation have worsened in the left upper lobe and are slightly less prominent in the right lower lobe. 3. Chronic findings are discussed **This report has been created using voice recognition software. It may contain minor errors which are inherent in voice recognition technology. ** Final report electronically signed by Dr Linwood Patel on 2/23/2023 3:31 PM    XR CHEST PORTABLE    Result Date: 2/23/2023  Chest Radiograph Comparison: 8/30/21. CT from 4/8/18 Findings: No cardiomegaly. Normal mediastinal contours. No pneumothorax. Question interstitial prominence. Patchy opacities in the left mid and lower lung zones. No pleural effusion. Normal upper abdomen. No fracture. Impression: Patchy opacities in the left mid and lower lung zones could be secondary to developing multifocal pneumonia or aspiration. Questionable interstitial prominence. Concurrent mild edema may also be considered. This document has been electronically signed by: Marilee Edward. Mauro Cronin MD on 02/23/2023 02:17 AM    VL DUP LOWER EXTREMITY VENOUS BILATERAL    Result Date: 2/24/2023  PROCEDURE: VL DUP LOWER EXTREMITY VENOUS BILATERAL CLINICAL INFORMATION: Persistent tachycardia, hypoxia. Bilateral calf pain. COMPARISON: No prior study. TECHNIQUE: Venous doppler ultrasound was performed of the bilateral lower extremities using gray scale, color flow and spectral doppler imaging. FINDINGS: There is normal color flow, spectral analysis and compressibility of the common femoral vein, superficial femoral vein and popliteal vein bilaterally . There is normal color flow and compressibility in the posterior tibial veins, anterior tibial veins and peroneal veins. No evidence of a DVT. **This report has been created using voice recognition software. It may contain minor errors which are inherent in voice recognition technology. ** Final report electronically signed by Dr Darci Anderson on 2/24/2023 2:19 PM      Electronically signed by Narciso Cid DO on 2/26/2023 at 4:02 PM

## 2023-02-26 NOTE — PROGRESS NOTES
[facility]   [facility]   [address]   [place] [address] [address]   [phone]   Nephrology Hospital progress note       2/26/2023, 1:17 PM        Pt Name:    Parvez Maxwell  MRN:     [id number]     YOB: 1955  Admit Date:    2/23/2023  1:08 AM  Primary Care Physician:  Arturo Pierre DO   Primary Nephrologist:          Dr. Mehrdad Pritchard number  [id number]    ISOLATION: none     Admitting Chief Complaint:   Shortness of breath and weakness. History of Chief Complaint:  The patient is a 74 y.o. female with history of COPD, chronic alcohol abuse who presented to the hospital with complaints of shortness of breath for 3 days associated with some generalized weakness, dyspnea on exertion, productive cough with whitish sputum. Reports symptoms are worse with minimal exertion. No alleviating factors. She reports poor appetite. She does report smoking 2 to 3 packs/day as well as drinking alcohol regularly. Denies any urinary complaints. No hematuria or dysuria. No dizziness or lightheadedness. On arrival yesterday morning her sodium was 121. Apparently patient received a normal saline bolus in the emergency room. She was managed by hospitalist services subsequently. I was called last night by the hospitalist (PA) and was informed that sodium had gone up to 132. Case discussed with hospitalist PA. Plan was made to order 750 mL bolus of D5W as well as DDAVP. Patient seen and examined this morning. I stopped LR infusion and started on dextrose infusion. Also ordered 1000 mL of D5W bolus. Patient not in any acute distress. She is awake and alert. Asymptomatic. Following commands but not a very good historian. Nephrology is treating:   Hyponatremia. Subjective: The patient was relaxing in bed. She was alert and lucid. She had no symptoms of hyponatremia and had no other complaints. She denied N/V/C/D/SOB/CP.      Lab Results   Component Value Date/Time LABGLOM >60 02/26/2023 06:31 AM    LABGLOM >60 02/25/2023 06:08 AM    LABGLOM >60 02/24/2023 04:57 AM    LABGLOM >60 02/23/2023 02:30 AM    LABGLOM >90 09/01/2021 05:49 AM    LABGLOM >90 08/31/2021 09:26 AM          Baseline eGFR    Admit eGFR    Current eGFR          Objective:    Diet: regular    VITALS:  /80   Pulse 91   Temp 97.4 °F (36.3 °C) (Oral)   Resp 20   Ht 5' 3\" (1.6 m)   Wt 105 lb (47.6 kg)   SpO2 98%   BMI 18.60 kg/m²   24HR INTAKE/OUTPUT:    Intake/Output Summary (Last 24 hours) at 2/26/2023 1317  Last data filed at 2/26/2023 3634  Gross per 24 hour   Intake 1210 ml   Output --   Net 1210 ml       Admission weight: 110 lb (49.9 kg)  Wt Readings from Last 3 Encounters:   02/25/23 105 lb (47.6 kg)   11/01/21 78 lb 6.4 oz (35.6 kg)   09/10/21 79 lb 9.6 oz (36.1 kg)     Body mass index is 18.6 kg/m². Physical examination    General:  Alert and cooperative with exam  HEENT:  Head: Normocephalic, no lesions, without obvious abnormality. Neck:   no adenopathy, no carotid bruit, no JVD, supple, symmetrical, trachea midline, and thyroid not enlarged, symmetric, no tenderness/mass/nodules  Lungs:  clear to auscultation bilaterally  Heart:  regular rate and rhythm, S1, S2 normal, no murmur, click, rub or gallop  Abdomen:  soft, non-tender; bowel sounds normal; no masses,  no organomegaly  Extremities:  extremities normal, atraumatic, no cyanosis or edema  Neurologic:  Mental status: Alert, oriented, thought content appropriate  Psy:                 No evidence of depression. Mood is stable. Skin:                Warm and dry. No lesions or rashes noted. Muscles:         Hand  and leg strength are equal and strong bilaterally.               Lab Data  CBC:   Recent Labs     02/24/23  0457 02/25/23  0608 02/26/23  0631   WBC 19.7* 14.7* 10.3   HGB 9.5* 10.0* 9.5*    383 393       BMP:  Recent Labs     02/24/23  0457 02/24/23  0708 02/25/23  0608 02/25/23  0616 02/26/23  0631   * < > 134*  130* 133* 137   K 4.3  --  3.8  --  3.6   CL 97*  --  94*  --  97*   CO2 26  --  30  --  31   BUN 9  --  4*  --  12   CREATININE 0.3*  --  0.3*  --  0.3*   GLUCOSE 172*  --  91  --  93   CALCIUM 8.6  --  8.7  --  8.4*    < > = values in this interval not displayed. TSH:   No results for input(s): TSH in the last 72 hours. HgBa1c: No results for input(s): LABA1C in the last 72 hours. Hepatic: No results for input(s): LABALBU, AST, ALT, ALB, BILITOT, ALKPHOS in the last 72 hours. Troponin: No results for input(s): TROPONINI in the last 72 hours. BNP: No results for input(s): BNP in the last 72 hours. Lipids: No results for input(s): CHOL, HDL in the last 72 hours. Invalid input(s): LDLCALCU  INR: No results for input(s): INR in the last 72 hours. Assessment:    Hyponatremia. Hypokalemia-resolved  COPD  Alcohol abuse. anemia     Plan: It is OK with nephrology for the primary care provider to discharge the patient when ready. Please note that prednisone causes sodium retention. Denver Quarry Moser, DO  [facility].

## 2023-02-26 NOTE — PLAN OF CARE
Problem: Respiratory - Adult  Goal: Achieves optimal ventilation and oxygenation  2/25/2023 2212 by Heidi Ragland RCP  Outcome: Progressing     Problem: Respiratory - Adult  Goal: Clear lung sounds  Description: Clear lung sounds  2/25/2023 2212 by Heidi Ragland RCP  Outcome: Progressing   Patient mutually agreed on goals.

## 2023-02-27 VITALS
BODY MASS INDEX: 18.61 KG/M2 | SYSTOLIC BLOOD PRESSURE: 129 MMHG | HEIGHT: 63 IN | HEART RATE: 92 BPM | DIASTOLIC BLOOD PRESSURE: 68 MMHG | RESPIRATION RATE: 20 BRPM | OXYGEN SATURATION: 90 % | WEIGHT: 105 LBS | TEMPERATURE: 98.6 F

## 2023-02-27 LAB
ANION GAP SERPL CALC-SCNC: 6 MEQ/L (ref 8–16)
BASOPHILS ABSOLUTE: 0 THOU/MM3 (ref 0–0.1)
BASOPHILS NFR BLD AUTO: 0.3 %
BUN SERPL-MCNC: 13 MG/DL (ref 7–22)
CALCIUM SERPL-MCNC: 8.6 MG/DL (ref 8.5–10.5)
CHLORIDE SERPL-SCNC: 98 MEQ/L (ref 98–111)
CO2 SERPL-SCNC: 34 MEQ/L (ref 23–33)
CREAT SERPL-MCNC: 0.4 MG/DL (ref 0.4–1.2)
DEPRECATED RDW RBC AUTO: 47.8 FL (ref 35–45)
EKG ATRIAL RATE: 102 BPM
EKG ATRIAL RATE: 124 BPM
EKG ATRIAL RATE: 124 BPM
EKG ATRIAL RATE: 127 BPM
EKG ATRIAL RATE: 130 BPM
EKG ATRIAL RATE: 132 BPM
EKG P AXIS: 72 DEGREES
EKG P AXIS: 73 DEGREES
EKG P AXIS: 75 DEGREES
EKG P AXIS: 76 DEGREES
EKG P AXIS: 77 DEGREES
EKG P AXIS: 84 DEGREES
EKG P-R INTERVAL: 136 MS
EKG P-R INTERVAL: 138 MS
EKG P-R INTERVAL: 140 MS
EKG P-R INTERVAL: 140 MS
EKG P-R INTERVAL: 146 MS
EKG P-R INTERVAL: 152 MS
EKG Q-T INTERVAL: 292 MS
EKG Q-T INTERVAL: 306 MS
EKG Q-T INTERVAL: 308 MS
EKG Q-T INTERVAL: 308 MS
EKG Q-T INTERVAL: 312 MS
EKG Q-T INTERVAL: 368 MS
EKG QRS DURATION: 82 MS
EKG QRS DURATION: 84 MS
EKG QRS DURATION: 84 MS
EKG QRS DURATION: 86 MS
EKG QRS DURATION: 90 MS
EKG QRS DURATION: 94 MS
EKG QTC CALCULATION (BAZETT): 432 MS
EKG QTC CALCULATION (BAZETT): 442 MS
EKG QTC CALCULATION (BAZETT): 442 MS
EKG QTC CALCULATION (BAZETT): 450 MS
EKG QTC CALCULATION (BAZETT): 453 MS
EKG QTC CALCULATION (BAZETT): 479 MS
EKG R AXIS: 67 DEGREES
EKG R AXIS: 72 DEGREES
EKG R AXIS: 72 DEGREES
EKG R AXIS: 75 DEGREES
EKG R AXIS: 77 DEGREES
EKG R AXIS: 84 DEGREES
EKG T AXIS: 57 DEGREES
EKG T AXIS: 66 DEGREES
EKG T AXIS: 66 DEGREES
EKG T AXIS: 67 DEGREES
EKG T AXIS: 76 DEGREES
EKG T AXIS: 79 DEGREES
EKG VENTRICULAR RATE: 102 BPM
EKG VENTRICULAR RATE: 124 BPM
EKG VENTRICULAR RATE: 124 BPM
EKG VENTRICULAR RATE: 127 BPM
EKG VENTRICULAR RATE: 130 BPM
EKG VENTRICULAR RATE: 132 BPM
EOSINOPHIL NFR BLD AUTO: 0.6 %
EOSINOPHILS ABSOLUTE: 0 THOU/MM3 (ref 0–0.4)
ERYTHROCYTE [DISTWIDTH] IN BLOOD BY AUTOMATED COUNT: 12.4 % (ref 11.5–14.5)
GFR SERPL CREATININE-BSD FRML MDRD: > 60 ML/MIN/1.73M2
GLUCOSE SERPL-MCNC: 91 MG/DL (ref 70–108)
HCT VFR BLD AUTO: 28.3 % (ref 37–47)
HGB BLD-MCNC: 9.3 GM/DL (ref 12–16)
IMM GRANULOCYTES # BLD AUTO: 0.07 THOU/MM3 (ref 0–0.07)
IMM GRANULOCYTES NFR BLD AUTO: 1 %
LYMPHOCYTES ABSOLUTE: 2.3 THOU/MM3 (ref 1–4.8)
LYMPHOCYTES NFR BLD AUTO: 34.7 %
MCH RBC QN AUTO: 34.8 PG (ref 26–33)
MCHC RBC AUTO-ENTMCNC: 32.9 GM/DL (ref 32.2–35.5)
MCV RBC AUTO: 106 FL (ref 81–99)
MONOCYTES ABSOLUTE: 0.7 THOU/MM3 (ref 0.4–1.3)
MONOCYTES NFR BLD AUTO: 10.8 %
NEUTROPHILS NFR BLD AUTO: 52.6 %
NRBC BLD AUTO-RTO: 0 /100 WBC
PLATELET # BLD AUTO: 404 THOU/MM3 (ref 130–400)
PMV BLD AUTO: 8.6 FL (ref 9.4–12.4)
POTASSIUM SERPL-SCNC: 3.7 MEQ/L (ref 3.5–5.2)
RBC # BLD AUTO: 2.67 MILL/MM3 (ref 4.2–5.4)
SEGMENTED NEUTROPHILS ABSOLUTE COUNT: 3.5 THOU/MM3 (ref 1.8–7.7)
SODIUM SERPL-SCNC: 138 MEQ/L (ref 135–145)
WBC # BLD AUTO: 6.7 THOU/MM3 (ref 4.8–10.8)

## 2023-02-27 PROCEDURE — 94760 N-INVAS EAR/PLS OXIMETRY 1: CPT

## 2023-02-27 PROCEDURE — 2580000003 HC RX 258

## 2023-02-27 PROCEDURE — 6370000000 HC RX 637 (ALT 250 FOR IP)

## 2023-02-27 PROCEDURE — 80048 BASIC METABOLIC PNL TOTAL CA: CPT

## 2023-02-27 PROCEDURE — 99239 HOSP IP/OBS DSCHRG MGMT >30: CPT | Performed by: STUDENT IN AN ORGANIZED HEALTH CARE EDUCATION/TRAINING PROGRAM

## 2023-02-27 PROCEDURE — 6360000002 HC RX W HCPCS: Performed by: PSYCHIATRY & NEUROLOGY

## 2023-02-27 PROCEDURE — 6370000000 HC RX 637 (ALT 250 FOR IP): Performed by: PSYCHIATRY & NEUROLOGY

## 2023-02-27 PROCEDURE — 85025 COMPLETE CBC W/AUTO DIFF WBC: CPT

## 2023-02-27 PROCEDURE — 6360000002 HC RX W HCPCS

## 2023-02-27 PROCEDURE — 94640 AIRWAY INHALATION TREATMENT: CPT

## 2023-02-27 PROCEDURE — 99232 SBSQ HOSP IP/OBS MODERATE 35: CPT | Performed by: INTERNAL MEDICINE

## 2023-02-27 PROCEDURE — 36415 COLL VENOUS BLD VENIPUNCTURE: CPT

## 2023-02-27 PROCEDURE — 2700000000 HC OXYGEN THERAPY PER DAY

## 2023-02-27 RX ORDER — CEFDINIR 300 MG/1
300 CAPSULE ORAL 2 TIMES DAILY
Qty: 8 CAPSULE | Refills: 0 | Status: SHIPPED | OUTPATIENT
Start: 2023-02-27 | End: 2023-02-27 | Stop reason: SDUPTHER

## 2023-02-27 RX ORDER — PREDNISONE 20 MG/1
40 TABLET ORAL DAILY
Qty: 2 TABLET | Refills: 0 | Status: SHIPPED | OUTPATIENT
Start: 2023-02-27 | End: 2023-02-28

## 2023-02-27 RX ORDER — FOLIC ACID 1 MG/1
1 TABLET ORAL DAILY
Qty: 30 TABLET | Refills: 3 | Status: SHIPPED | OUTPATIENT
Start: 2023-02-27

## 2023-02-27 RX ORDER — DOXYCYCLINE HYCLATE 100 MG
100 TABLET ORAL EVERY 12 HOURS SCHEDULED
Qty: 10 TABLET | Refills: 0 | Status: SHIPPED | OUTPATIENT
Start: 2023-02-27 | End: 2023-02-27 | Stop reason: SDUPTHER

## 2023-02-27 RX ORDER — LANOLIN ALCOHOL/MO/W.PET/CERES
100 CREAM (GRAM) TOPICAL DAILY
Qty: 30 TABLET | Refills: 3 | Status: SHIPPED | OUTPATIENT
Start: 2023-02-27

## 2023-02-27 RX ORDER — ALBUTEROL SULFATE 90 UG/1
2 AEROSOL, METERED RESPIRATORY (INHALATION) 4 TIMES DAILY PRN
Qty: 54 G | Refills: 1 | Status: SHIPPED | OUTPATIENT
Start: 2023-02-27

## 2023-02-27 RX ORDER — CEFDINIR 300 MG/1
300 CAPSULE ORAL 2 TIMES DAILY
Qty: 8 CAPSULE | Refills: 0 | Status: SHIPPED | OUTPATIENT
Start: 2023-02-27 | End: 2023-03-03

## 2023-02-27 RX ORDER — DOXYCYCLINE HYCLATE 100 MG
100 TABLET ORAL EVERY 12 HOURS SCHEDULED
Qty: 10 TABLET | Refills: 0 | Status: SHIPPED | OUTPATIENT
Start: 2023-02-27 | End: 2023-03-04

## 2023-02-27 RX ORDER — MULTIVITAMIN WITH IRON
1 TABLET ORAL DAILY
Qty: 30 TABLET | Refills: 0 | Status: SHIPPED | OUTPATIENT
Start: 2023-02-27 | End: 2023-03-29

## 2023-02-27 RX ORDER — PREDNISONE 20 MG/1
40 TABLET ORAL DAILY
Qty: 2 TABLET | Refills: 0 | Status: SHIPPED | OUTPATIENT
Start: 2023-02-27 | End: 2023-02-27 | Stop reason: SDUPTHER

## 2023-02-27 RX ADMIN — SUCRALFATE 1 G: 1 TABLET ORAL at 08:20

## 2023-02-27 RX ADMIN — Medication 1 TABLET: at 08:20

## 2023-02-27 RX ADMIN — Medication 100 MG: at 08:20

## 2023-02-27 RX ADMIN — ACETAMINOPHEN 650 MG: 325 TABLET ORAL at 11:37

## 2023-02-27 RX ADMIN — ENOXAPARIN SODIUM 30 MG: 100 INJECTION SUBCUTANEOUS at 08:19

## 2023-02-27 RX ADMIN — SODIUM CHLORIDE, PRESERVATIVE FREE 10 ML: 5 INJECTION INTRAVENOUS at 00:24

## 2023-02-27 RX ADMIN — ACETAMINOPHEN 650 MG: 325 TABLET ORAL at 00:23

## 2023-02-27 RX ADMIN — ALBUTEROL SULFATE 2.5 MG: 2.5 SOLUTION RESPIRATORY (INHALATION) at 08:39

## 2023-02-27 RX ADMIN — ALBUTEROL SULFATE 2.5 MG: 2.5 SOLUTION RESPIRATORY (INHALATION) at 14:31

## 2023-02-27 RX ADMIN — PANTOPRAZOLE SODIUM 40 MG: 40 TABLET, DELAYED RELEASE ORAL at 06:38

## 2023-02-27 RX ADMIN — PREDNISONE 40 MG: 20 TABLET ORAL at 08:21

## 2023-02-27 RX ADMIN — IPRATROPIUM BROMIDE 0.5 MG: 0.5 SOLUTION RESPIRATORY (INHALATION) at 08:39

## 2023-02-27 RX ADMIN — IPRATROPIUM BROMIDE 0.5 MG: 0.5 SOLUTION RESPIRATORY (INHALATION) at 14:31

## 2023-02-27 RX ADMIN — FOLIC ACID 1 MG: 1 TABLET ORAL at 08:20

## 2023-02-27 RX ADMIN — METOPROLOL TARTRATE 25 MG: 25 TABLET, FILM COATED ORAL at 08:21

## 2023-02-27 RX ADMIN — SUCRALFATE 1 G: 1 TABLET ORAL at 11:37

## 2023-02-27 RX ADMIN — DOXYCYCLINE HYCLATE 100 MG: 100 TABLET, COATED ORAL at 08:19

## 2023-02-27 RX ADMIN — SODIUM CHLORIDE, PRESERVATIVE FREE 10 ML: 5 INJECTION INTRAVENOUS at 08:20

## 2023-02-27 RX ADMIN — CEFTRIAXONE SODIUM 1000 MG: 1 INJECTION, POWDER, FOR SOLUTION INTRAMUSCULAR; INTRAVENOUS at 03:13

## 2023-02-27 ASSESSMENT — PAIN DESCRIPTION - LOCATION: LOCATION: HEAD

## 2023-02-27 ASSESSMENT — PAIN SCALES - GENERAL
PAINLEVEL_OUTOF10: 0
PAINLEVEL_OUTOF10: 3

## 2023-02-27 NOTE — RT PROTOCOL NOTE
RT Inhaler-Nebulizer Bronchodilator Protocol Note    There is a bronchodilator order in the chart from a provider indicating to follow the RT Bronchodilator Protocol and there is an Initiate RT Inhaler-Nebulizer Bronchodilator Protocol order as well (see protocol at bottom of note). CXR Findings:  No results found. The findings from the last RT Protocol Assessment were as follows:   History Pulmonary Disease: Chronic pulmonary disease  Respiratory Pattern: Dyspnea on exertion or RR 21-25 bpm  Breath Sounds: Intermittent or unilateral wheezes  Cough: Strong, productive  Indication for Bronchodilator Therapy: Wheezing associated with pulm disorder  Bronchodilator Assessment Score: 9    Aerosolized bronchodilator medication orders have been revised according to the RT Inhaler-Nebulizer Bronchodilator Protocol below. Respiratory Therapist to perform RT Therapy Protocol Assessment initially then follow the protocol. Repeat RT Therapy Protocol Assessment PRN for score 0-3 or on second treatment, BID, and PRN for scores above 3. No Indications - adjust the frequency to every 6 hours PRN wheezing or bronchospasm, if no treatments needed after 48 hours then discontinue using Per Protocol order mode. If indication present, adjust the RT bronchodilator orders based on the Bronchodilator Assessment Score as indicated below. Use Inhaler orders unless patient has one or more of the following: on home nebulizer, not able to hold breath for 10 seconds, is not alert and oriented, cannot activate and use MDI correctly, or respiratory rate 25 breaths per minute or more, then use the equivalent nebulizer order(s) with same Frequency and PRN reasons based on the score. If a patient is on this medication at home then do not decrease Frequency below that used at home.     0-3 - enter or revise RT bronchodilator order(s) to equivalent RT Bronchodilator order with Frequency of every 4 hours PRN for wheezing or increased work of breathing using Per Protocol order mode. 4-6 - enter or revise RT Bronchodilator order(s) to two equivalent RT bronchodilator orders with one order with BID Frequency and one order with Frequency of every 4 hours PRN wheezing or increased work of breathing using Per Protocol order mode. 7-10 - enter or revise RT Bronchodilator order(s) to two equivalent RT bronchodilator orders with one order with TID Frequency and one order with Frequency of every 4 hours PRN wheezing or increased work of breathing using Per Protocol order mode. 11-13 - enter or revise RT Bronchodilator order(s) to one equivalent RT bronchodilator order with QID Frequency and an Albuterol order with Frequency of every 4 hours PRN wheezing or increased work of breathing using Per Protocol order mode. Greater than 13 - enter or revise RT Bronchodilator order(s) to one equivalent RT bronchodilator order with every 4 hours Frequency and an Albuterol order with Frequency of every 2 hours PRN wheezing or increased work of breathing using Per Protocol order mode. RT to enter RT Home Evaluation for COPD & MDI Assessment order using Per Protocol order mode.     Electronically signed by Jimenez Lira RCP on 2/27/2023 at 8:45 AM

## 2023-02-27 NOTE — PLAN OF CARE
Problem: Respiratory - Adult  Goal: Achieves optimal ventilation and oxygenation  Outcome: Progressing  Goal: Clear lung sounds  Description: Clear lung sounds  Outcome: Progressing   Patient mutually agreed on goals.

## 2023-02-27 NOTE — CARE COORDINATION
CODY spoke with patient. Her correct address is:  [place] [address] [place] [address]  CM updated registration with current address. Discussed prescriptions. She states her preferred pharmacy is [facility] [employer]. Prescribing providers was concerned about cost of medications. CM called to [facility] where Rx were sent to this weekend. They state her insurance would not cover much, when asked they didn't have the patient listed with Medicaid. Spoke with patient she is agreeable to [facility] for meds to beds. New copies of insurance cards sent to financial services. Copy will be sent to outpatient pharmacy. From there we will know better if she will need financial assistance with her RX. Provider updated on this and need to transfer scripts. Discussed home O2 needs with patient. Discussed possible O2 providers in the area. [facility]'s preference is [facility]. Call placed by CODY to [facility] to update on orders and arrange delivery. 2/27/23, 10:22 AM EST    Patient goals/plan/ treatment preferences discussed by  and . Patient goals/plan/ treatment preferences reviewed with patient/ family. Patient/ family verbalize understanding of discharge plan and are in agreement with goal/plan/treatment preferences. Understanding was demonstrated using the teach back method. AVS provided by RN at time of discharge, which includes all necessary medical information pertaining to the patients current course of illness, treatment, post-discharge goals of care, and treatment preferences.      Services At/After Discharge: DME  Home O2 from [facility]

## 2023-02-27 NOTE — DISCHARGE INSTRUCTIONS
Followup with PCP within 10 days. Continue taking medications as prescribed. Complete Labs prior to PCP visit.

## 2023-02-27 NOTE — PROGRESS NOTES
[facility]   Nephrology progress note  2/27/2023, 10:51 AM      Pt Name:    Reginaldo Escudero  MRN:     [id number]     YOB: 1955  Admit Date:    2/23/2023  1:08 AM    Chief Complaint: Nephrology following for hyponatremia    Subjective:  Patient was seen and examined this morning  Poor historian  No chest pain or shortness of breath  Feels okay    Objective:  24HR INTAKE/OUTPUT:    Intake/Output Summary (Last 24 hours) at 2/27/2023 1051  Last data filed at 2/27/2023 0546  Gross per 24 hour   Intake 440 ml   Output --   Net 440 ml         I/O last 3 completed shifts: In: 3353 [P.O.:1290]  Out: -   No intake/output data recorded.    Admission weight: 110 lb (49.9 kg)  Wt Readings from Last 3 Encounters:   02/25/23 105 lb (47.6 kg)   11/01/21 78 lb 6.4 oz (35.6 kg)   09/10/21 79 lb 9.6 oz (36.1 kg)        Vitals :   Vitals:    02/27/23 0209 02/27/23 0340 02/27/23 0800 02/27/23 0839   BP: 125/67 131/76 (!) 141/92    Pulse: (!) 105 85 (!) 106 (!) 114   Resp: 20 19 20 20   Temp: 98.1 °F (36.7 °C) 97.8 °F (36.6 °C) 98.3 °F (36.8 °C)    TempSrc:  Oral Oral    SpO2:  97% 95% 92%   Weight:       Height:           Physical examination  General Appearance: alert and cooperative with exam, appears comfortable, no distress  Neck: No JVD visibly noted  Lungs: No labored breathing, no use of accessory muscles  GI: soft  Extremities: No LE edema noted    Medications:  Infusion:    sodium chloride       Meds:    albuterol  2.5 mg Nebulization TID    ipratropium  0.5 mg Nebulization TID    doxycycline hyclate  100 mg Oral 2 times per day    metoprolol tartrate  25 mg Oral BID    enoxaparin  30 mg SubCUTAneous Daily    methylPREDNISolone  125 mg IntraVENous Once    pantoprazole  40 mg Oral QAM AC    sucralfate  1 g Oral 4x Daily    sodium chloride flush  10 mL IntraVENous 2 times per day    predniSONE  40 mg Oral Daily    multivitamin  1 tablet Oral Daily    folic acid  1 mg Oral Daily    thiamine 100 mg Oral Daily    dextrose 5% lactated ringers  250 mL IntraVENous Once     Meds prn: sodium chloride flush, sodium chloride, ondansetron **OR** ondansetron, polyethylene glycol, acetaminophen **OR** acetaminophen, potassium chloride **OR** potassium alternative oral replacement **OR** potassium chloride, magnesium sulfate, PHENobarbital **OR** PHENobarbital **OR** PHENobarbital (LUMINAL) IVPB, albuterol **AND** ipratropium     Lab Data :  CBC:   Recent Labs     02/25/23  0608 02/26/23  0631 02/27/23  0547   WBC 14.7* 10.3 6.7   HGB 10.0* 9.5* 9.3*   HCT 30.1* 28.3* 28.3*    393 404*     CMP:  Recent Labs     02/25/23  0608 02/25/23  0616 02/26/23  0631 02/27/23  0547   *  130* 133* 137 138   K 3.8  --  3.6 3.7   CL 94*  --  97* 98   CO2 30  --  31 34*   BUN 4*  --  12 13   CREATININE 0.3*  --  0.3* 0.4   GLUCOSE 91  --  93 91   CALCIUM 8.7  --  8.4* 8.6     Hepatic: No results for input(s): LABALBU, AST, ALT, ALB, BILITOT, ALKPHOS in the last 72 hours. Assessment and Plan:  Hyponatremia. Has corrected  Stable electrolytes at this time  Alcohol cessation recommended  Hypokalemia resolved  COPD  History of alcohol abuse  Anemia    Elvin Cogan, MD  [facility]    This report has been created using voice recognition software.  It may contain minor errors which are inherent in voice recognition technology

## 2023-02-27 NOTE — DISCHARGE SUMMARY
Internal Medicine Resident Discharge Summary      Patient Identification:   Camilla Bain   : 1955  MRN: [id number]   Account: [de-identified]   Patient's PCP: Alison Coats DO    Admit Date: 2023   Discharge Date:   23      Admitting Physician: Luisito Oneil DO  Discharge Physician: Sriram Boyle DO       Discharge Diagnoses:  Hypovolemic hypotonic hypoosmotic hyponatremia, improved  Sodium 123 on admission. Likely due to beer potomania. Overcorrected NS 1 L bolus in ED. Nephrology consulted overnight, s/p 1 L D5W bolus, DDAVP. Sodium in the afternoon of 127 on . Sodium improved to 134 on . Follow-up with BMP in 1 week, PCP in 1 week  Acute hypoxic respiratory failure  Secondary to COPD. BL RA, on home O2 eval, patient was on room air at rest, required 2 L of O2 on exertion. Discharged with home O2. Discharged with prednisone for 1 day. COPD exacerbation  No PFTs on record. Patient has a history of COPD. Currently requires O2 supplementation. Follow-up with pulmonology for PFTs. Discharged with albuterol, Stiolto  Community-acquired pneumonia  WBC 13.8 on admission, Pro-Sam 0.29, chest x-ray displays possible patchy opacities in left middle lobe, left lower lobe. Discharge with cefdinir for 4 days, doxycycline for 5 days  Alcohol abuse  Patient stated she has beer 3-4 times a week, last drink . PAWSS:1  Continue multivitamin, folate, thiamine  Chronic macrocytic anemia  Likely secondary to alcohol abuse. No signs of bleeding at this time. History of discoid lupus  Patient no longer takes Plaquenil due to switching PCPs. Follow-up with PCP and/or rheumatologist  Prominent diffuse tree-in-bud opacities  Noted on CTA chest W/Wo contrast.  IgE elevated at 43. No peripheral eosinophilia concerning for ABPA.   Follow-up with a CTA in 3 months to reassess for improvement, and if findings/symptoms persist, follow-up with pulmonology for bronchoscopy  Hypokalemia, resolved      Hospital Course:   Chico Stallworth is a 74 y.o. female with PMHx  COPD, alcohol abuse  admitted to [address] [place] [address] on 2/23/2023 for SOB. Patient reports of shortness of breath that started approximately 2 days prior to admission with activity but has been progressively worsening and since that explain significant SOB at rest.  Her symptoms do make the improved with prolonged rest, worse with minimal exertion. She reports associated productive cough with white sputum. She denied any associated chest pain, fever, chills, lightheadedness, dizziness. Patient reports smoking 3-4 PPD and drinks alcohol 3-4 days out of the week, her choice of alcohol is beer. She otherwise denies recent ill contact, recent illness or recent change in medications, sore throat, runny nose, sinus congestion, abdominal pain or change in bowel habits or urinary habits. On 2/23, patient was initiated on prednisone, ceftriaxone and azithromycin. Overnight on 2/23, patient had rapid correction of sodium, from 121 to 132 within 14 hours. Patient received multiple D5W boluses, DDAVP 3 mcg x 1. Patient's sodium went down to 124 on 2/24. On 2/25, patient's sodium correction rate improved, resulting in sodium 134. On home O2 eval, patient was on room air at rest, required 2 L of O2 on exertion. Discharged with home O2. The patient was seen and examined on day of discharge and this discharge summary is in conjunction with any daily progress note from day of discharge. The patient is discharged in stable condition.        Exam:   Vitals:  Vitals:    02/27/23 0340 02/27/23 0800 02/27/23 0839 02/27/23 1130   BP: 131/76 (!) 141/92  129/68   Pulse: 85 (!) 106 (!) 114 86   Resp: 19 20 20 20   Temp: 97.8 °F (36.6 °C) 98.3 °F (36.8 °C)  98.6 °F (37 °C)   TempSrc: Oral Oral  Oral   SpO2: 97% 95% 92% 93%   Weight:       Height:         Weight: Weight: 105 lb (47.6 kg)     General appearance: No apparent distress, well developed, appears stated age. Eyes:  Pupils equal, round, and reactive to light. Conjunctivae/corneas clear. HENT: Head normal in appearance. External nares normal.  Oral mucosa moist without lesions. Hearing grossly intact. Neck: Supple, with full range of motion. Trachea midline. No gross JVD appreciated. Respiratory:  Normal respiratory effort. Clear to auscultation, bilaterally without rales or wheezes or rhonchi. Cardiovascular: Normal rate, regular rhythm with normal S1/S2 without murmurs. No lower extremity edema. Abdomen: Soft, non-tender, non-distended with normal bowel sounds. Musculoskeletal: There is no joint swelling or tenderness. Normal tone. No abnormal movements. Skin: Warm and dry. No rashes or lesions. Neurologic:  No focal sensory/motor deficits in the upper and lower extremities. Cranial nerves:  grossly non-focal 2-12. Psychiatric: Alert and oriented, normal insight and thought content. Capillary Refill: Brisk,< 3 seconds. Peripheral Pulses: +2 palpable, equal bilaterally. Labs: For convenience the most recent labs are provided:  CBC:    Lab Results   Component Value Date/Time    WBC 6.7 02/27/2023 05:47 AM    HGB 9.3 02/27/2023 05:47 AM    HCT 28.3 02/27/2023 05:47 AM     02/27/2023 05:47 AM     Renal:    Lab Results   Component Value Date/Time     02/27/2023 05:47 AM    K 3.7 02/27/2023 05:47 AM    CL 98 02/27/2023 05:47 AM    CO2 34 02/27/2023 05:47 AM    BUN 13 02/27/2023 05:47 AM    CREATININE 0.4 02/27/2023 05:47 AM    CALCIUM 8.6 02/27/2023 05:47 AM    PHOS 2.7 04/11/2018 03:43 AM     Liver:   Lab Results   Component Value Date/Time    AST 29 08/30/2021 11:45 AM    ALT 25 08/30/2021 11:45 AM         Significant Diagnostic Studies    Radiology:   VL DUP LOWER EXTREMITY VENOUS BILATERAL   Final Result   No evidence of a DVT. **This report has been created using voice recognition software.  It may contain minor errors which are inherent in voice recognition technology. **      Final report electronically signed by Dr Pawel Eid on 2/24/2023 2:19 PM      CTA CHEST [address]   Final Result   1. No filling defects are noted within the pulmonary arterial vasculature to suggest the presence of pulmonary embolism. 2. Mediastinal and hilar lymphadenopathy has improved. Prominent diffuse tree-in-bud opacities suggesting small airway infection/inflammation have worsened in the left upper lobe and are slightly less prominent in the right lower lobe. 3. Chronic findings are discussed            **This report has been created using voice recognition software. It may contain minor errors which are inherent in voice recognition technology. **      Final report electronically signed by Dr Vince Mccarthy on 2/23/2023 3:31 PM      XR CHEST PORTABLE   Final Result   Impression:   Patchy opacities in the left mid and lower lung zones could be secondary    to developing multifocal pneumonia or aspiration. Questionable interstitial prominence. Concurrent mild edema may also be    considered. This document has been electronically signed by: Wayne Garvin. Sonal Castro MD    on 02/23/2023 02:17 AM             Consults:   IP CONSULT TO NEPHROLOGY  IP CONSULT TO ADDICTION SERVICES    Disposition: Home  Condition at Discharge: Stable    Code Status:  Full Code     Patient Instructions:    Discharge lab work: BMP in a week  Activity: activity as tolerated  Diet: ADULT DIET; Regular      Follow-up visits:   DO Sarthak Griffin W.  [facility]    Follow up in 1 week(s)      Sathya Rubio DO  [facility]  [phone]               Discharge Medications:      Medication List        START taking these medications      albuterol sulfate  (90 Base) MCG/ACT inhaler  Commonly known as: Ventolin HFA  Inhale 2 puffs into the lungs 4 times daily as needed for Wheezing     cefdinir 300 MG capsule  Commonly known as: OMNICEF  Take 1 capsule by mouth 2 times daily for 4 days     doxycycline hyclate 100 MG tablet  Commonly known as: VIBRA-TABS  Take 1 tablet by mouth every 12 hours for 5 days     folic acid 1 MG tablet  Commonly known as: FOLVITE  Take 1 tablet by mouth daily     metoprolol tartrate 25 MG tablet  Commonly known as: LOPRESSOR  Take 1 tablet by mouth 2 times daily     multivitamin Tabs tablet  Take 1 tablet by mouth daily     predniSONE 20 MG tablet  Commonly known as: DELTASONE  Take 2 tablets by mouth daily for 1 day     thiamine 100 MG tablet  Take 1 tablet by mouth daily     tiotropium-olodaterol 2.5-2.5 MCG/ACT Aers  Commonly known as: STIOLTO  Inhale 2 puffs into the lungs daily            STOP taking these medications      acetaminophen 500 MG tablet  Commonly known as: TYLENOL     hydroxychloroquine 200 MG tablet  Commonly known as: PLAQUENIL     pantoprazole 40 MG tablet  Commonly known as: PROTONIX     sucralfate 1 GM tablet  Commonly known as: CARAFATE               Where to Get Your Medications        These medications were sent to [address] [place] [address] , [address] [facility], [address] [address] [address]      Phone: [phone]   albuterol sulfate  (90 Base) MCG/ACT inhaler  cefdinir 300 MG capsule  doxycycline hyclate 161 MG tablet  folic acid 1 MG tablet  metoprolol tartrate 25 MG tablet  multivitamin Tabs tablet  predniSONE 20 MG tablet  thiamine 100 MG tablet  tiotropium-olodaterol 2.5-2.5 MCG/ACT Aers          Time Spent on discharge is 35 minutes in the examination, evaluation, counseling and review of medications and discharge plan. Thank you Pam Clay DO for the opportunity to be involved in this patient's care.       Signed:    Electronically signed by Lalit Berry DO on 2/27/23 at 1:22 PM EST     Case was discussed with Attending, Dr. Truong Goins, DO

## 2023-02-27 NOTE — PLAN OF CARE
Problem: Respiratory - Adult  Goal: Clear lung sounds  Description: Clear lung sounds  2/27/2023 0845 by Marcus Basurto RCP  Outcome: Progressing     Problem: Discharge Planning  Goal: Discharge to home or other facility with appropriate resources  2/27/2023 1610 by Marito Gutierrez RN  Outcome: Adequate for Discharge  2/27/2023 0216 by Macy Farr RN  Outcome: Progressing  Flowsheets (Taken 2/26/2023 1932)  Discharge to home or other facility with appropriate resources:   Identify barriers to discharge with patient and caregiver   Arrange for needed discharge resources and transportation as appropriate     Problem: Chronic Conditions and Co-morbidities  Goal: Patient's chronic conditions and co-morbidity symptoms are monitored and maintained or improved  2/27/2023 1610 by Marito Gutierrez RN  Outcome: Adequate for Discharge  2/27/2023 0216 by Macy Farr RN  Outcome: Progressing  Flowsheets (Taken 2/26/2023 1932)  Care Plan - Patient's Chronic Conditions and Co-Morbidity Symptoms are Monitored and Maintained or Improved: Monitor and assess patient's chronic conditions and comorbid symptoms for stability, deterioration, or improvement     Problem: Skin/Tissue Integrity  Goal: Absence of new skin breakdown  Description: 1. Monitor for areas of redness and/or skin breakdown  2. Assess vascular access sites hourly  3. Every 4-6 hours minimum:  Change oxygen saturation probe site  4. Every 4-6 hours:  If on nasal continuous positive airway pressure, respiratory therapy assess nares and determine need for appliance change or resting period.   2/27/2023 1610 by Marito Gutierrez RN  Outcome: Adequate for Discharge  2/27/2023 0216 by Macy Farr RN  Outcome: Progressing     Problem: Pain  Goal: Verbalizes/displays adequate comfort level or baseline comfort level  2/27/2023 1610 by Marito Gutierrez RN  Outcome: Adequate for Discharge  2/27/2023 0216 by Macy Farr RN  Outcome: Progressing Problem: Respiratory - Adult  Goal: Achieves optimal ventilation and oxygenation  2/27/2023 1610 by Pop Roa RN  Outcome: Adequate for Discharge  2/27/2023 0216 by Roseanna Mcknight RN  Outcome: Progressing     Problem: Cardiovascular - Adult  Goal: Maintains optimal cardiac output and hemodynamic stability  2/27/2023 1610 by Pop Roa RN  Outcome: Adequate for Discharge  2/27/2023 0216 by Roseanna Mcknight RN  Outcome: Progressing  Flowsheets (Taken 2/26/2023 1932)  Maintains optimal cardiac output and hemodynamic stability: Monitor blood pressure and heart rate  Goal: Absence of cardiac dysrhythmias or at baseline  2/27/2023 1610 by Pop Roa RN  Outcome: Adequate for Discharge  2/27/2023 0216 by Roseanna Mcknight RN  Outcome: Progressing  Flowsheets (Taken 2/26/2023 1932)  Absence of cardiac dysrhythmias or at baseline: Monitor cardiac rate and rhythm     Problem: Skin/Tissue Integrity - Adult  Goal: Skin integrity remains intact  2/27/2023 1610 by Pop Roa RN  Outcome: Adequate for Discharge  2/27/2023 0216 by Roseanna Mcknight RN  Outcome: Progressing  Flowsheets (Taken 2/26/2023 1932)  Skin Integrity Remains Intact: Monitor for areas of redness and/or skin breakdown  Goal: Oral mucous membranes remain intact  2/27/2023 1610 by Pop Roa RN  Outcome: Adequate for Discharge  2/27/2023 0216 by Roseanna Mcknight RN  Outcome: Progressing     Problem: Genitourinary - Adult  Goal: Absence of urinary retention  2/27/2023 1610 by Pop Roa RN  Outcome: Adequate for Discharge  2/27/2023 0216 by Roseanna Mcknight RN  Outcome: Progressing     Problem: Infection - Adult  Goal: Absence of infection at discharge  2/27/2023 1610 by Pop Roa RN  Outcome: Adequate for Discharge  2/27/2023 0216 by Roseanna Mcknight RN  Outcome: Progressing  Flowsheets (Taken 2/26/2023 1932)  Absence of infection at discharge: Assess and monitor for signs and symptoms of infection  Goal: Absence of infection during hospitalization  2/27/2023 1610 by Nikky Garcia RN  Outcome: Adequate for Discharge  2/27/2023 0216 by Joleen Buitrago RN  Outcome: Progressing  Flowsheets (Taken 2/26/2023 1932)  Absence of infection during hospitalization: Assess and monitor for signs and symptoms of infection     Problem: Metabolic/Fluid and Electrolytes - Adult  Goal: Electrolytes maintained within normal limits  2/27/2023 1610 by Nikky Garcia RN  Outcome: Adequate for Discharge  2/27/2023 0216 by Joleen Buitrago RN  Outcome: Progressing  Flowsheets (Taken 2/26/2023 1932)  Electrolytes maintained within normal limits: Monitor labs and assess patient for signs and symptoms of electrolyte imbalances  Goal: Hemodynamic stability and optimal renal function maintained  2/27/2023 1610 by Nikky Garcia RN  Outcome: Adequate for Discharge  2/27/2023 0216 by Joleen Buitrago RN  Outcome: Progressing  Flowsheets (Taken 2/26/2023 1932)  Hemodynamic stability and optimal renal function maintained:   Monitor labs and assess for signs and symptoms of volume excess or deficit   Monitor intake, output and patient weight     Problem: Hematologic - Adult  Goal: Maintains hematologic stability  2/27/2023 1610 by Nikky Garcia RN  Outcome: Adequate for Discharge  2/27/2023 0216 by Joleen Buitrago RN  Outcome: Progressing  Flowsheets (Taken 2/26/2023 1932)  Maintains hematologic stability: Assess for signs and symptoms of bleeding or hemorrhage     Problem: Safety - Adult  Goal: Free from fall injury  2/27/2023 1610 by Nikky Garcia RN  Outcome: Adequate for Discharge  2/27/2023 0216 by Joleen Buitrago RN  Outcome: Progressing     Problem: ABCDS Injury Assessment  Goal: Absence of physical injury  2/27/2023 1610 by Nikky Garcia RN  Outcome: Adequate for Discharge  2/27/2023 0216 by Joleen Buitrago RN  Outcome: Progressing

## 2023-02-27 NOTE — PLAN OF CARE
Problem: Discharge Planning  Goal: Discharge to home or other facility with appropriate resources  Outcome: Progressing  Flowsheets (Taken 2/26/2023 1932)  Discharge to home or other facility with appropriate resources:   Identify barriers to discharge with patient and caregiver   Arrange for needed discharge resources and transportation as appropriate     Problem: Chronic Conditions and Co-morbidities  Goal: Patient's chronic conditions and co-morbidity symptoms are monitored and maintained or improved  Outcome: Progressing  Flowsheets (Taken 2/26/2023 1932)  Care Plan - Patient's Chronic Conditions and Co-Morbidity Symptoms are Monitored and Maintained or Improved: Monitor and assess patient's chronic conditions and comorbid symptoms for stability, deterioration, or improvement     Problem: Respiratory - Adult  Goal: Achieves optimal ventilation and oxygenation  2/27/2023 0216 by Elray Fothergill, RN  Outcome: Progressing  2/26/2023 2210 by Janet Morales RCP  Outcome: Progressing  Flowsheets (Taken 2/26/2023 1932 by Elray Fothergill, RN)  Achieves optimal ventilation and oxygenation: Encourage broncho-pulmonary hygiene including cough, deep breathe, incentive spirometry   Care plan reviewed with patient and Patient verbalize understanding of the plan of care and contribute to goal setting.

## 2023-02-27 NOTE — PLAN OF CARE
Patient was evaluated today for the diagnosis of COPD. I entered a DME order for home oxygen because the diagnosis and testing requires the patient to have supplemental oxygen. Condition will improve or be benefited by oxygen use. The patient is not able to perform good mobility in a home setting and therefore does require the use of a portable oxygen system. The need for this equipment was discussed with the patient and she understands and is in agreement.       Electronically signed by Richelle Valencia DO on 2/27/2023 at 8:12 AM

## 2023-02-27 NOTE — PLAN OF CARE
Problem: Respiratory - Adult  Goal: Clear lung sounds  Description: Clear lung sounds  2/27/2023 0845 by Bienvenido Krause RCP  Outcome: Progressing   Continue inhaled txs. Patient mutually agrees.

## 2023-02-27 NOTE — PLAN OF CARE
Patient was evaluated today for the diagnosis of COPD. I entered a DME order for home oxygen because the diagnosis and testing requires the patient to have supplemental oxygen. Condition will improve or be benefited by oxygen use. The patient is able to perform good mobility in a home setting and therefore does require the use of a portable oxygen system. The need for this equipment was discussed with the patient and she understands and is in agreement.

## 2023-02-28 ENCOUNTER — TELEPHONE (OUTPATIENT)
Dept: FAMILY MEDICINE CLINIC | Age: 68
End: 2023-02-28

## 2023-02-28 LAB
BACTERIA BLD AEROBE CULT: NORMAL
BACTERIA BLD AEROBE CULT: NORMAL

## 2023-02-28 NOTE — TELEPHONE ENCOUNTER
Care Transitions Initial Follow Up Call    Outreach made within 2 business days of discharge: Yes    Patient: Moshe Canales Patient : 1955   MRN: [id number]  Reason for Admission: There are no discharge diagnoses documented for the most recent discharge. Discharge Date: 23       Spoke with: Pt    Discharge department/facility: [facility]    TCM Interactive Patient Contact:  Was patient able to fill all prescriptions: Yes  Was patient instructed to bring all medications to the follow-up visit: Yes  Is patient taking all medications as directed in the discharge summary? Yes  Does patient understand their discharge instructions: Yes  Does patient have questions or concerns that need addressed prior to 7-14 day follow up office visit: no    Scheduled appointment with PCP within 7-14 days    Follow Up  No future appointments.     Marsha Martínez CMA (AAMA)

## 2023-11-16 NOTE — ED NOTES
CRIS DENIS [id number]     Study Date: 11/15/23 14:53-18:26  Duration: 3 hr 29 min  --------------------------------------------------------------------------------------------------  History:  CC/ HPI Patient is a 46y old  Female who presents with a chief complaint of Cardiac Arrest (16 Nov 2023 07:38)    MEDICATIONS  (STANDING):  aspirin  chewable 81 milliGRAM(s) Oral daily  chlorhexidine 0.12% Liquid 15 milliLiter(s) Oral Mucosa every 12 hours  heparin   Injectable 5000 Unit(s) SubCutaneous every 8 hours  piperacillin/tazobactam IVPB.. 3.375 Gram(s) IV Intermittent every 8 hours    --------------------------------------------------------------------------------------------------  Study Interpretation:    [[[Abbreviation Key:  PDR=alpha rhythm/posterior dominant rhythm. A-P=anterior posterior.  Amplitude: ‘very low’:<20; ‘low’:20-49; ‘medium’:; ‘high’:>150uV.  Persistence for periodic/rhythmic patterns (% of epoch) ‘rare’:<1%; ‘occasional’:1-10%; ‘frequent’:10-50%; ‘abundant’:50-90%; ‘continuous’:>90%.  Persistence for sporadic discharges: ‘rare’:<1/hr; ‘occasional’:1/min-1/hr; ‘frequent’:>1/min; ‘abundant’:>1/10 sec.  RPP=rhythmic and periodic patterns; GRDA=generalized rhythmic delta activity; FIRDA=frontal intermittent GRDA; LRDA=lateralized rhythmic delta activity; TIRDA=temporal intermittent rhythmic delta activity;  LPD=PLED=lateralized periodic discharges; GPD=generalized periodic discharges; BIPDs =bilateral independent periodic discharges; Mf=multifocal; SIRPDs=stimulus induced rhythmic, periodic, or ictal appearing discharges; BIRDs=brief potentially ictal rhythmic discharges >4 Hz, lasting .5-10s; PFA (paroxysmal bursts >13 Hz or =8 Hz <10s).  Modifiers: +F=with fast component; +S=with spike component; +R=with rhythmic component.  S-B=burst suppression pattern.  Max=maximal. N1-drowsy; N2-stage II sleep; N3-slow wave sleep. SSS/BETS=small sharp spikes/benign epileptiform transients of sleep. HV=hyperventilation; PS=photic stimulation]]]    Daily EEG Visual Analysis    FINDINGS:      Background:  The background is symmetric and diffusely suppressed (< 10 uV). There is no clear cerebral activity >= 10 uV in amplitude.    Background Slowing:  Generalized slowing: As above  Focal slowing: None    State Changes:   Absent    Interictal Findings:  None    Electrographic and Electroclinical seizures:  None    Other Clinical Events:  None    Activation Procedures:   Hyperventilation is not performed.    Photic stimulation is not performed.    Artifacts:  Intermittent myogenic and movement artifacts are present.    EKG:  Single-lead EKG shows regular rhythm.    EEG Classification / Summary:  Abnormal video-EEG in a comatose patient.  Diffuse background suppression.  No focal or epileptiform abnormalities are captured.     Clinical Impression:  Profound diffuse cerebral dysfunction is nonspecific in etiology.     If intended to assist with neuroprognostication in a comatose patient after cardiac arrest, EEG is best performed >=72 hr from ROSC (or >=72 hr from rewarming if therapeutic hypothermia is used) and in the absence of confounding factors such as sedating medications, hypothermia, or other confounding factors. See Neurocritical Care Society "Guidelines for Neuroprognostication in Comatose Adult Survivors of Cardiac Arrest" (2023).           -------------------------------------------------------------------------------------------------------  [facility] EEG Reading Room Ph#: [phone]  Epilepsy Answering Service after 5PM and before 8:30AM: Ph#: [phone]    Elsa Zhu MD  Attending Physician, [facility] Epilepsy [place]   Report given to [facility], RN     Edward Zazueta RN  02/23/23 9191 CRIS DENIS [id number]     Study Date: 11/15/23 14:53-18:26  Duration: 3 hr 29 min  --------------------------------------------------------------------------------------------------  History:  CC/ HPI Patient is a 46y old  Female who presents with a chief complaint of Cardiac Arrest (16 Nov 2023 07:38)    MEDICATIONS  (STANDING):  aspirin  chewable 81 milliGRAM(s) Oral daily  chlorhexidine 0.12% Liquid 15 milliLiter(s) Oral Mucosa every 12 hours  heparin   Injectable 5000 Unit(s) SubCutaneous every 8 hours  piperacillin/tazobactam IVPB.. 3.375 Gram(s) IV Intermittent every 8 hours    --------------------------------------------------------------------------------------------------  Study Interpretation:    [[[Abbreviation Key:  PDR=alpha rhythm/posterior dominant rhythm. A-P=anterior posterior.  Amplitude: ‘very low’:<20; ‘low’:20-49; ‘medium’:; ‘high’:>150uV.  Persistence for periodic/rhythmic patterns (% of epoch) ‘rare’:<1%; ‘occasional’:1-10%; ‘frequent’:10-50%; ‘abundant’:50-90%; ‘continuous’:>90%.  Persistence for sporadic discharges: ‘rare’:<1/hr; ‘occasional’:1/min-1/hr; ‘frequent’:>1/min; ‘abundant’:>1/10 sec.  RPP=rhythmic and periodic patterns; GRDA=generalized rhythmic delta activity; FIRDA=frontal intermittent GRDA; LRDA=lateralized rhythmic delta activity; TIRDA=temporal intermittent rhythmic delta activity;  LPD=PLED=lateralized periodic discharges; GPD=generalized periodic discharges; BIPDs =bilateral independent periodic discharges; Mf=multifocal; SIRPDs=stimulus induced rhythmic, periodic, or ictal appearing discharges; BIRDs=brief potentially ictal rhythmic discharges >4 Hz, lasting .5-10s; PFA (paroxysmal bursts >13 Hz or =8 Hz <10s).  Modifiers: +F=with fast component; +S=with spike component; +R=with rhythmic component.  S-B=burst suppression pattern.  Max=maximal. N1-drowsy; N2-stage II sleep; N3-slow wave sleep. SSS/BETS=small sharp spikes/benign epileptiform transients of sleep. HV=hyperventilation; PS=photic stimulation]]]    Daily EEG Visual Analysis    FINDINGS:      Background:  The background is symmetric and diffusely suppressed (< 10 uV). There is no clear cerebral activity >= 10 uV in amplitude.    Background Slowing:  Generalized slowing: As above  Focal slowing: None    State Changes:   Absent    Interictal Findings:  None    Electrographic and Electroclinical seizures:  None    Other Clinical Events:  None    Activation Procedures:   Hyperventilation is not performed.    Photic stimulation is not performed.    Artifacts:  Intermittent electrode movement artifacts are present.    EKG:  Single-lead EKG shows regular rhythm.    EEG Classification / Summary:  Abnormal video-EEG in a comatose patient.  Diffuse background suppression.  No focal or epileptiform abnormalities are captured.     Clinical Impression:  Profound diffuse cerebral dysfunction is nonspecific in etiology.     In comatose patients after cardiac arrest, and in the right clinical context (including absence of confounding factors such as sedating medications, hypothermia, or other factors), a diffusely suppressed EEG background >=72 hr from ROSC (or >=72 hr from rewarming if therapeutic hypothermia is used) can be considered a moderately reliable predictor of poor functional outcome assessed at >=3 months. Level of uncertainty of prognosis may vary by clinical situation and results of other prognostic modalities. See Neurocritical Care Society "Guidelines for Neuroprognostication in Comatose Adult Survivors of Cardiac Arrest" (2023).            -------------------------------------------------------------------------------------------------------  [facility] EEG Reading Room Ph#: [phone]  Epilepsy Answering Service after 5PM and before 8:30AM: Ph#: [phone]    Elsa Zhu MD  Attending Physician, [facility] Epilepsy [place]   CRIS DENIS [id number]     Study Date: 11/15/23 14:53-18:26  Duration: 3 hr 30 min  --------------------------------------------------------------------------------------------------  History:  CC/ HPI Patient is a 46y old  Female who presents with a chief complaint of Cardiac Arrest (16 Nov 2023 07:38)    MEDICATIONS  (STANDING):  aspirin  chewable 81 milliGRAM(s) Oral daily  chlorhexidine 0.12% Liquid 15 milliLiter(s) Oral Mucosa every 12 hours  heparin   Injectable 5000 Unit(s) SubCutaneous every 8 hours  piperacillin/tazobactam IVPB.. 3.375 Gram(s) IV Intermittent every 8 hours    --------------------------------------------------------------------------------------------------  Study Interpretation:    [[[Abbreviation Key:  PDR=alpha rhythm/posterior dominant rhythm. A-P=anterior posterior.  Amplitude: ‘very low’:<20; ‘low’:20-49; ‘medium’:; ‘high’:>150uV.  Persistence for periodic/rhythmic patterns (% of epoch) ‘rare’:<1%; ‘occasional’:1-10%; ‘frequent’:10-50%; ‘abundant’:50-90%; ‘continuous’:>90%.  Persistence for sporadic discharges: ‘rare’:<1/hr; ‘occasional’:1/min-1/hr; ‘frequent’:>1/min; ‘abundant’:>1/10 sec.  RPP=rhythmic and periodic patterns; GRDA=generalized rhythmic delta activity; FIRDA=frontal intermittent GRDA; LRDA=lateralized rhythmic delta activity; TIRDA=temporal intermittent rhythmic delta activity;  LPD=PLED=lateralized periodic discharges; GPD=generalized periodic discharges; BIPDs =bilateral independent periodic discharges; Mf=multifocal; SIRPDs=stimulus induced rhythmic, periodic, or ictal appearing discharges; BIRDs=brief potentially ictal rhythmic discharges >4 Hz, lasting .5-10s; PFA (paroxysmal bursts >13 Hz or =8 Hz <10s).  Modifiers: +F=with fast component; +S=with spike component; +R=with rhythmic component.  S-B=burst suppression pattern.  Max=maximal. N1-drowsy; N2-stage II sleep; N3-slow wave sleep. SSS/BETS=small sharp spikes/benign epileptiform transients of sleep. HV=hyperventilation; PS=photic stimulation]]]    Daily EEG Visual Analysis    FINDINGS:      Background:  The background is symmetric and diffusely suppressed (< 10 uV). There is no clear cerebral activity >= 10 uV in amplitude.    Background Slowing:  Generalized slowing: As above  Focal slowing: None    State Changes:   Absent    Interictal Findings:  None    Electrographic and Electroclinical seizures:  None    Other Clinical Events:  None    Activation Procedures:   Hyperventilation is not performed.    Photic stimulation is not performed.    Artifacts:  Intermittent electrode movement artifacts are present.    EKG:  Single-lead EKG shows regular rhythm.    EEG Classification / Summary:  Abnormal video-EEG in a comatose patient.  Diffuse background suppression.  No focal or epileptiform abnormalities are captured.     Clinical Impression:  Profound diffuse cerebral dysfunction is nonspecific in etiology.     In comatose patients after cardiac arrest, and in the right clinical context (including absence of confounding factors such as sedating medications, hypothermia, or other factors), a diffusely suppressed EEG background >=72 hr from ROSC (or >=72 hr from rewarming if therapeutic hypothermia is used) can be considered a moderately reliable predictor of poor functional outcome assessed at >=3 months. Level of uncertainty of prognosis may vary by clinical situation and results of other prognostic modalities. See Neurocritical Care Society "Guidelines for Neuroprognostication in Comatose Adult Survivors of Cardiac Arrest" (2023).            -------------------------------------------------------------------------------------------------------  [facility] EEG Reading Room Ph#: [phone]  Epilepsy Answering Service after 5PM and before 8:30AM: Ph#: [phone]    Elsa Zhu MD  Attending Physician, [facility] Epilepsy [place]

## (undated) DEVICE — BIOGUARD A/W CLEANING ADAPTER

## (undated) DEVICE — GLOVE ORTHO 8   MSG9480